# Patient Record
Sex: FEMALE | Race: WHITE | Employment: PART TIME | URBAN - METROPOLITAN AREA
[De-identification: names, ages, dates, MRNs, and addresses within clinical notes are randomized per-mention and may not be internally consistent; named-entity substitution may affect disease eponyms.]

---

## 2017-04-05 ENCOUNTER — TELEPHONE (OUTPATIENT)
Dept: SLEEP MEDICINE | Age: 52
End: 2017-04-05

## 2017-04-09 DIAGNOSIS — E06.3 HASHIMOTO'S DISEASE: Chronic | ICD-10-CM

## 2017-04-10 ENCOUNTER — TELEPHONE (OUTPATIENT)
Dept: SLEEP MEDICINE | Age: 52
End: 2017-04-10

## 2017-04-10 RX ORDER — ERGOCALCIFEROL 1.25 MG/1
CAPSULE ORAL
Qty: 8 CAPSULE | Refills: 4 | Status: SHIPPED | OUTPATIENT
Start: 2017-04-10 | End: 2017-09-05 | Stop reason: SDUPTHER

## 2017-04-10 RX ORDER — LEVOTHYROXINE SODIUM 0.05 MG/1
TABLET ORAL
Qty: 30 TABLET | Refills: 4 | Status: SHIPPED | OUTPATIENT
Start: 2017-04-10 | End: 2017-09-15 | Stop reason: SDUPTHER

## 2017-04-13 ENCOUNTER — OFFICE VISIT (OUTPATIENT)
Dept: INTERNAL MEDICINE | Age: 52
End: 2017-04-13

## 2017-04-13 ENCOUNTER — TELEPHONE (OUTPATIENT)
Dept: INTERNAL MEDICINE | Age: 52
End: 2017-04-13

## 2017-04-13 VITALS
DIASTOLIC BLOOD PRESSURE: 78 MMHG | SYSTOLIC BLOOD PRESSURE: 118 MMHG | BODY MASS INDEX: 35.12 KG/M2 | WEIGHT: 186 LBS | HEIGHT: 61 IN

## 2017-04-13 DIAGNOSIS — R10.11 RIGHT UPPER QUADRANT ABDOMINAL PAIN: Primary | ICD-10-CM

## 2017-04-13 DIAGNOSIS — G47.33 OBSTRUCTIVE SLEEP APNEA (ADULT) (PEDIATRIC): Chronic | ICD-10-CM

## 2017-04-13 DIAGNOSIS — E03.4 HYPOTHYROIDISM DUE TO ACQUIRED ATROPHY OF THYROID: ICD-10-CM

## 2017-04-13 PROCEDURE — 99214 OFFICE O/P EST MOD 30 MIN: CPT | Performed by: INTERNAL MEDICINE

## 2017-04-13 RX ORDER — CLONAZEPAM 0.5 MG/1
TABLET ORAL
Qty: 60 TABLET | Refills: 0 | OUTPATIENT
Start: 2017-04-13 | End: 2017-05-12 | Stop reason: SDUPTHER

## 2017-04-13 ASSESSMENT — ENCOUNTER SYMPTOMS
DIARRHEA: 0
SHORTNESS OF BREATH: 0
NAUSEA: 1
COUGH: 0
BACK PAIN: 0
BLOATING: 0
CHEST TIGHTNESS: 0
CONSTIPATION: 0
EYE REDNESS: 0
ABDOMINAL PAIN: 0
VOMITING: 0

## 2017-05-15 RX ORDER — CLONAZEPAM 0.5 MG/1
TABLET ORAL
Qty: 60 TABLET | Refills: 0 | Status: SHIPPED | OUTPATIENT
Start: 2017-05-15 | End: 2017-06-10 | Stop reason: SDUPTHER

## 2017-06-01 ENCOUNTER — OFFICE VISIT (OUTPATIENT)
Dept: PULMONOLOGY | Age: 52
End: 2017-06-01

## 2017-06-01 VITALS
DIASTOLIC BLOOD PRESSURE: 72 MMHG | WEIGHT: 173.2 LBS | BODY MASS INDEX: 32.7 KG/M2 | HEART RATE: 74 BPM | OXYGEN SATURATION: 98 % | HEIGHT: 61 IN | SYSTOLIC BLOOD PRESSURE: 120 MMHG

## 2017-06-01 DIAGNOSIS — M79.7 FIBROMYALGIA: Chronic | ICD-10-CM

## 2017-06-01 DIAGNOSIS — F51.04 INSOMNIA, PSYCHOPHYSIOLOGICAL: Chronic | ICD-10-CM

## 2017-06-01 DIAGNOSIS — F41.0 PANIC ATTACK: Chronic | ICD-10-CM

## 2017-06-01 DIAGNOSIS — E06.3 HASHIMOTO'S DISEASE: Chronic | ICD-10-CM

## 2017-06-01 DIAGNOSIS — G47.33 OBSTRUCTIVE SLEEP APNEA (ADULT) (PEDIATRIC): Primary | Chronic | ICD-10-CM

## 2017-06-01 PROBLEM — E03.4 HYPOTHYROIDISM DUE TO ACQUIRED ATROPHY OF THYROID: Chronic | Status: ACTIVE | Noted: 2017-04-13

## 2017-06-01 PROCEDURE — 99214 OFFICE O/P EST MOD 30 MIN: CPT | Performed by: INTERNAL MEDICINE

## 2017-06-01 RX ORDER — MIRTAZAPINE 15 MG/1
TABLET, FILM COATED ORAL
Qty: 30 TABLET | Refills: 3 | Status: SHIPPED | OUTPATIENT
Start: 2017-06-01 | End: 2017-09-01

## 2017-06-01 ASSESSMENT — SLEEP AND FATIGUE QUESTIONNAIRES
HOW LIKELY ARE YOU TO NOD OFF OR FALL ASLEEP WHEN YOU ARE A PASSENGER IN A CAR FOR AN HOUR WITHOUT A BREAK: 1
HOW LIKELY ARE YOU TO NOD OFF OR FALL ASLEEP IN A CAR, WHILE STOPPED FOR A FEW MINUTES IN TRAFFIC: 1
HOW LIKELY ARE YOU TO NOD OFF OR FALL ASLEEP WHILE LYING DOWN TO REST IN THE AFTERNOON WHEN CIRCUMSTANCES PERMIT: 1
HOW LIKELY ARE YOU TO NOD OFF OR FALL ASLEEP WHILE SITTING AND TALKING TO SOMEONE: 0
HOW LIKELY ARE YOU TO NOD OFF OR FALL ASLEEP WHILE SITTING AND READING: 1
HOW LIKELY ARE YOU TO NOD OFF OR FALL ASLEEP WHILE SITTING INACTIVE IN A PUBLIC PLACE: 0
HOW LIKELY ARE YOU TO NOD OFF OR FALL ASLEEP WHILE WATCHING TV: 1
HOW LIKELY ARE YOU TO NOD OFF OR FALL ASLEEP WHILE SITTING QUIETLY AFTER LUNCH WITHOUT ALCOHOL: 0
ESS TOTAL SCORE: 5

## 2017-06-01 ASSESSMENT — ENCOUNTER SYMPTOMS
APNEA: 0
COUGH: 0
RHINORRHEA: 0
SHORTNESS OF BREATH: 0
CHOKING: 0

## 2017-06-12 RX ORDER — CLONAZEPAM 0.5 MG/1
TABLET ORAL
Qty: 60 TABLET | Refills: 0 | OUTPATIENT
Start: 2017-06-12 | End: 2017-07-10 | Stop reason: SDUPTHER

## 2017-07-10 ENCOUNTER — TELEPHONE (OUTPATIENT)
Dept: INTERNAL MEDICINE | Age: 52
End: 2017-07-10

## 2017-07-10 RX ORDER — CLONAZEPAM 0.5 MG/1
0.5 TABLET ORAL 2 TIMES DAILY PRN
Qty: 60 TABLET | Refills: 0 | OUTPATIENT
Start: 2017-07-10 | End: 2017-08-17 | Stop reason: SDUPTHER

## 2017-08-02 ENCOUNTER — OFFICE VISIT (OUTPATIENT)
Dept: PULMONOLOGY | Age: 52
End: 2017-08-02

## 2017-08-02 VITALS
DIASTOLIC BLOOD PRESSURE: 80 MMHG | HEART RATE: 68 BPM | SYSTOLIC BLOOD PRESSURE: 118 MMHG | OXYGEN SATURATION: 98 % | WEIGHT: 167 LBS | HEIGHT: 61 IN | BODY MASS INDEX: 31.53 KG/M2

## 2017-08-02 DIAGNOSIS — M79.7 FIBROMYALGIA: Chronic | ICD-10-CM

## 2017-08-02 DIAGNOSIS — G47.33 OBSTRUCTIVE SLEEP APNEA (ADULT) (PEDIATRIC): Primary | Chronic | ICD-10-CM

## 2017-08-02 DIAGNOSIS — E66.9 NON MORBID OBESITY, UNSPECIFIED OBESITY TYPE: Chronic | ICD-10-CM

## 2017-08-02 DIAGNOSIS — F51.04 INSOMNIA, PSYCHOPHYSIOLOGICAL: Chronic | ICD-10-CM

## 2017-08-02 DIAGNOSIS — F32.A DEPRESSION, UNSPECIFIED DEPRESSION TYPE: Chronic | ICD-10-CM

## 2017-08-02 DIAGNOSIS — E06.3 HASHIMOTO'S DISEASE: Chronic | ICD-10-CM

## 2017-08-02 PROCEDURE — 99214 OFFICE O/P EST MOD 30 MIN: CPT | Performed by: INTERNAL MEDICINE

## 2017-08-02 ASSESSMENT — SLEEP AND FATIGUE QUESTIONNAIRES
HOW LIKELY ARE YOU TO NOD OFF OR FALL ASLEEP IN A CAR, WHILE STOPPED FOR A FEW MINUTES IN TRAFFIC: 1
HOW LIKELY ARE YOU TO NOD OFF OR FALL ASLEEP WHEN YOU ARE A PASSENGER IN A CAR FOR AN HOUR WITHOUT A BREAK: 1
ESS TOTAL SCORE: 6
HOW LIKELY ARE YOU TO NOD OFF OR FALL ASLEEP WHILE SITTING AND READING: 1
HOW LIKELY ARE YOU TO NOD OFF OR FALL ASLEEP WHILE SITTING INACTIVE IN A PUBLIC PLACE: 1
HOW LIKELY ARE YOU TO NOD OFF OR FALL ASLEEP WHILE SITTING QUIETLY AFTER LUNCH WITHOUT ALCOHOL: 0
HOW LIKELY ARE YOU TO NOD OFF OR FALL ASLEEP WHILE LYING DOWN TO REST IN THE AFTERNOON WHEN CIRCUMSTANCES PERMIT: 1
HOW LIKELY ARE YOU TO NOD OFF OR FALL ASLEEP WHILE WATCHING TV: 1
HOW LIKELY ARE YOU TO NOD OFF OR FALL ASLEEP WHILE SITTING AND TALKING TO SOMEONE: 0

## 2017-08-02 ASSESSMENT — ENCOUNTER SYMPTOMS
SHORTNESS OF BREATH: 0
RHINORRHEA: 0
APNEA: 0
COUGH: 0
CHOKING: 0

## 2017-08-17 RX ORDER — CLONAZEPAM 0.5 MG/1
0.5 TABLET ORAL 2 TIMES DAILY PRN
Qty: 60 TABLET | Refills: 1 | OUTPATIENT
Start: 2017-08-17 | End: 2017-11-10 | Stop reason: SDUPTHER

## 2017-08-18 ENCOUNTER — TELEPHONE (OUTPATIENT)
Dept: INTERNAL MEDICINE | Age: 52
End: 2017-08-18

## 2017-08-18 ENCOUNTER — HOSPITAL ENCOUNTER (OUTPATIENT)
Dept: SLEEP MEDICINE | Age: 52
Discharge: HOME | End: 2017-08-19
Attending: INTERNAL MEDICINE | Admitting: INTERNAL MEDICINE

## 2017-08-18 DIAGNOSIS — Z12.11 COLON CANCER SCREENING: Primary | ICD-10-CM

## 2017-08-18 DIAGNOSIS — G47.33 OBSTRUCTIVE SLEEP APNEA (ADULT) (PEDIATRIC): Chronic | ICD-10-CM

## 2017-08-18 DIAGNOSIS — Z12.4 CERVICAL CANCER SCREENING: ICD-10-CM

## 2017-08-18 PROCEDURE — 95810 POLYSOM 6/> YRS 4/> PARAM: CPT | Performed by: INTERNAL MEDICINE

## 2017-08-23 ENCOUNTER — TELEPHONE (OUTPATIENT)
Dept: SLEEP MEDICINE | Age: 52
End: 2017-08-23

## 2017-09-01 ENCOUNTER — OFFICE VISIT (OUTPATIENT)
Dept: GYNECOLOGY | Age: 52
End: 2017-09-01

## 2017-09-01 VITALS
DIASTOLIC BLOOD PRESSURE: 62 MMHG | HEIGHT: 61 IN | SYSTOLIC BLOOD PRESSURE: 118 MMHG | HEART RATE: 63 BPM | WEIGHT: 170 LBS | OXYGEN SATURATION: 95 % | BODY MASS INDEX: 32.1 KG/M2

## 2017-09-01 DIAGNOSIS — Z11.3 SCREEN FOR STD (SEXUALLY TRANSMITTED DISEASE): ICD-10-CM

## 2017-09-01 DIAGNOSIS — Z12.39 BREAST CANCER SCREENING: ICD-10-CM

## 2017-09-01 DIAGNOSIS — Z12.4 CERVICAL CANCER SCREENING: ICD-10-CM

## 2017-09-01 DIAGNOSIS — Z76.89 ENCOUNTER TO ESTABLISH CARE: Primary | ICD-10-CM

## 2017-09-01 DIAGNOSIS — Z01.419 WOMEN'S ANNUAL ROUTINE GYNECOLOGICAL EXAMINATION: ICD-10-CM

## 2017-09-01 LAB
BACTERIA WET PREP: NORMAL
CLUE CELLS: NORMAL
EPITHELIAL CELLS WET PREP: NORMAL
HEPATITIS B SURFACE ANTIGEN INTERPRETATION: NORMAL
HEPATITIS C ANTIBODY INTERPRETATION: NORMAL
RBC WET PREP: NORMAL
SOURCE WET PREP: NORMAL
TRICHOMONAS PREP: NORMAL
WBC WET PREP: NORMAL
YEAST WET PREP: NORMAL

## 2017-09-01 PROCEDURE — 99386 PREV VISIT NEW AGE 40-64: CPT | Performed by: NURSE PRACTITIONER

## 2017-09-02 LAB — RPR: NORMAL

## 2017-09-03 LAB — HIV-1 AND HIV-2 ANTIBODIES: NORMAL

## 2017-09-05 RX ORDER — ERGOCALCIFEROL 1.25 MG/1
CAPSULE ORAL
Qty: 8 CAPSULE | Refills: 3 | Status: SHIPPED | OUTPATIENT
Start: 2017-09-05 | End: 2018-01-02 | Stop reason: SDUPTHER

## 2017-09-06 LAB
HPV COMMENT: NORMAL
HPV TYPE 16: NOT DETECTED
HPV TYPE 18: NOT DETECTED
HPVOH (OTHER TYPES): NOT DETECTED

## 2017-09-07 ENCOUNTER — TELEPHONE (OUTPATIENT)
Dept: PULMONOLOGY | Age: 52
End: 2017-09-07

## 2017-09-07 RX ORDER — GABAPENTIN 300 MG/1
CAPSULE ORAL
Qty: 90 CAPSULE | Refills: 3 | Status: SHIPPED | OUTPATIENT
Start: 2017-09-07 | End: 2018-02-13 | Stop reason: SDUPTHER

## 2017-09-08 LAB
C. TRACHOMATIS DNA,THIN PREP: NEGATIVE
N. GONORRHOEAE DNA, THIN PREP: NEGATIVE

## 2017-09-11 ENCOUNTER — OFFICE VISIT (OUTPATIENT)
Dept: ENDOCRINOLOGY | Age: 52
End: 2017-09-11

## 2017-09-11 VITALS
BODY MASS INDEX: 31.49 KG/M2 | DIASTOLIC BLOOD PRESSURE: 62 MMHG | SYSTOLIC BLOOD PRESSURE: 110 MMHG | RESPIRATION RATE: 16 BRPM | HEIGHT: 61 IN | HEART RATE: 55 BPM | OXYGEN SATURATION: 98 % | WEIGHT: 166.8 LBS

## 2017-09-11 DIAGNOSIS — E03.4 HYPOTHYROIDISM DUE TO ACQUIRED ATROPHY OF THYROID: Chronic | ICD-10-CM

## 2017-09-11 DIAGNOSIS — E04.1 THYROID NODULE: ICD-10-CM

## 2017-09-11 DIAGNOSIS — E55.9 VITAMIN D DEFICIENCY: ICD-10-CM

## 2017-09-11 DIAGNOSIS — E06.3 HASHIMOTO'S DISEASE: Primary | Chronic | ICD-10-CM

## 2017-09-11 DIAGNOSIS — E03.8 SUBCLINICAL HYPOTHYROIDISM: ICD-10-CM

## 2017-09-11 PROCEDURE — 99213 OFFICE O/P EST LOW 20 MIN: CPT | Performed by: INTERNAL MEDICINE

## 2017-09-12 ENCOUNTER — TELEPHONE (OUTPATIENT)
Dept: ENDOCRINOLOGY | Age: 52
End: 2017-09-12

## 2017-09-15 DIAGNOSIS — E06.3 HASHIMOTO'S DISEASE: Chronic | ICD-10-CM

## 2017-09-15 RX ORDER — LEVOTHYROXINE SODIUM 0.05 MG/1
TABLET ORAL
Qty: 30 TABLET | Refills: 3 | Status: SHIPPED | OUTPATIENT
Start: 2017-09-15 | End: 2018-01-18 | Stop reason: SDUPTHER

## 2017-09-19 ENCOUNTER — TELEPHONE (OUTPATIENT)
Dept: SLEEP MEDICINE | Age: 52
End: 2017-09-19

## 2017-10-02 ENCOUNTER — TELEPHONE (OUTPATIENT)
Dept: INTERNAL MEDICINE | Age: 52
End: 2017-10-02

## 2017-10-02 DIAGNOSIS — Z12.11 ENCOUNTER FOR SCREENING COLONOSCOPY: Primary | ICD-10-CM

## 2017-10-02 NOTE — TELEPHONE ENCOUNTER
Patient needs an insurance referral to see:  Dr. Rosa M Jones-  Gastroenterology    Insurance: [address] ID # [id number]     Appointment is on : Needs referral first- but would like to schedule asap    What are they being seen for: Colonoscopy    Tax ID # or NPI # Pt did not have    Fax number [phone]    Phone number [phone]

## 2017-10-03 NOTE — TELEPHONE ENCOUNTER
I tried doing a referral online [facility] and it stated patient didn't need a referral but the gastro place was stating she needed one. So I ordered one thru the computer and faxed it.  Patient informed

## 2017-10-20 ENCOUNTER — TELEPHONE (OUTPATIENT)
Dept: SLEEP MEDICINE | Age: 52
End: 2017-10-20

## 2017-10-20 NOTE — TELEPHONE ENCOUNTER
Pt called to cancel appointment for 10/25. Pt lmom. I returned Carolynn Lieberman phone call and left a message on their voicemail to call the office back at their earliest convenience to reschedule the appt.

## 2017-11-10 ENCOUNTER — TELEPHONE (OUTPATIENT)
Dept: INTERNAL MEDICINE | Age: 52
End: 2017-11-10

## 2017-11-10 RX ORDER — CLONAZEPAM 0.5 MG/1
0.5 TABLET ORAL 2 TIMES DAILY PRN
Qty: 60 TABLET | Refills: 1 | OUTPATIENT
Start: 2017-11-10 | End: 2018-04-19 | Stop reason: SDUPTHER

## 2017-11-10 NOTE — TELEPHONE ENCOUNTER
Ok to phone in refill. Orders Placed This Encounter   Medications    clonazePAM (KLONOPIN) 0.5 MG tablet     Sig: Take 1 tablet by mouth 2 times daily as needed for Anxiety . Dispense:  60 tablet     Refill:  1     Controlled Substances Monitoring:     Attestation: The Prescription Monitoring Report for this patient was reviewed today. (Emanuel Rice MD)  Documentation: No signs of potential drug abuse or diversion identified., Existing medication contract.  Ira Castro MD)

## 2017-11-10 NOTE — TELEPHONE ENCOUNTER
Pt called needs RX for clonazePAM (KLONOPIN) 0.5 MG tablet Sent to . .. Pharm on file . .. Advised Pt to check with Pharm in 24 hours . ..  Pls send

## 2018-01-02 RX ORDER — ERGOCALCIFEROL 1.25 MG/1
CAPSULE ORAL
Qty: 8 CAPSULE | Refills: 2 | Status: SHIPPED | OUTPATIENT
Start: 2018-01-02 | End: 2018-06-17 | Stop reason: SDUPTHER

## 2018-01-03 ENCOUNTER — TELEPHONE (OUTPATIENT)
Dept: INTERNAL MEDICINE | Age: 53
End: 2018-01-03

## 2018-01-18 DIAGNOSIS — E06.3 HASHIMOTO'S DISEASE: Chronic | ICD-10-CM

## 2018-01-18 RX ORDER — LEVOTHYROXINE SODIUM 0.05 MG/1
TABLET ORAL
Qty: 30 TABLET | Refills: 3 | Status: SHIPPED | OUTPATIENT
Start: 2018-01-18 | End: 2018-06-16 | Stop reason: SDUPTHER

## 2018-02-13 DIAGNOSIS — G47.33 OBSTRUCTIVE SLEEP APNEA: Primary | Chronic | ICD-10-CM

## 2018-02-13 RX ORDER — GABAPENTIN 300 MG/1
CAPSULE ORAL
Qty: 90 CAPSULE | Refills: 0 | Status: SHIPPED | OUTPATIENT
Start: 2018-02-13 | End: 2018-03-19 | Stop reason: SDUPTHER

## 2018-02-22 ENCOUNTER — OFFICE VISIT (OUTPATIENT)
Dept: INTERNAL MEDICINE | Age: 53
End: 2018-02-22

## 2018-02-22 VITALS
DIASTOLIC BLOOD PRESSURE: 68 MMHG | HEIGHT: 61 IN | OXYGEN SATURATION: 98 % | WEIGHT: 153 LBS | BODY MASS INDEX: 28.89 KG/M2 | HEART RATE: 74 BPM | SYSTOLIC BLOOD PRESSURE: 122 MMHG

## 2018-02-22 DIAGNOSIS — M54.50 CHRONIC MIDLINE LOW BACK PAIN WITHOUT SCIATICA: ICD-10-CM

## 2018-02-22 DIAGNOSIS — E03.4 HYPOTHYROIDISM DUE TO ACQUIRED ATROPHY OF THYROID: Chronic | ICD-10-CM

## 2018-02-22 DIAGNOSIS — G89.29 CHRONIC MIDLINE LOW BACK PAIN WITHOUT SCIATICA: ICD-10-CM

## 2018-02-22 DIAGNOSIS — C20 RECTAL CANCER (HCC): ICD-10-CM

## 2018-02-22 DIAGNOSIS — Z01.818 PRE-OP EXAM: Primary | ICD-10-CM

## 2018-02-22 DIAGNOSIS — Z01.818 PRE-OP EXAM: ICD-10-CM

## 2018-02-22 LAB
ANION GAP SERPL CALCULATED.3IONS-SCNC: 14 MMOL/L (ref 3–16)
BASOPHILS ABSOLUTE: 0.1 K/UL (ref 0–0.2)
BASOPHILS RELATIVE PERCENT: 0.7 %
BUN BLDV-MCNC: 18 MG/DL (ref 7–20)
CALCIUM SERPL-MCNC: 10.1 MG/DL (ref 8.3–10.6)
CHLORIDE BLD-SCNC: 101 MMOL/L (ref 99–110)
CO2: 24 MMOL/L (ref 21–32)
CREAT SERPL-MCNC: 0.6 MG/DL (ref 0.6–1.1)
EOSINOPHILS ABSOLUTE: 0.2 K/UL (ref 0–0.6)
EOSINOPHILS RELATIVE PERCENT: 2.4 %
GFR AFRICAN AMERICAN: >60
GFR NON-AFRICAN AMERICAN: >60
GLUCOSE BLD-MCNC: 91 MG/DL (ref 70–99)
HCT VFR BLD CALC: 42.3 % (ref 36–48)
HEMOGLOBIN: 13.9 G/DL (ref 12–16)
LYMPHOCYTES ABSOLUTE: 4.1 K/UL (ref 1–5.1)
LYMPHOCYTES RELATIVE PERCENT: 39.7 %
MCH RBC QN AUTO: 30.4 PG (ref 26–34)
MCHC RBC AUTO-ENTMCNC: 32.8 G/DL (ref 31–36)
MCV RBC AUTO: 92.6 FL (ref 80–100)
MONOCYTES ABSOLUTE: 0.7 K/UL (ref 0–1.3)
MONOCYTES RELATIVE PERCENT: 6.3 %
NEUTROPHILS ABSOLUTE: 5.3 K/UL (ref 1.7–7.7)
NEUTROPHILS RELATIVE PERCENT: 50.9 %
PDW BLD-RTO: 14.3 % (ref 12.4–15.4)
PLATELET # BLD: 340 K/UL (ref 135–450)
PMV BLD AUTO: 10 FL (ref 5–10.5)
POTASSIUM SERPL-SCNC: 4.4 MMOL/L (ref 3.5–5.1)
RBC # BLD: 4.57 M/UL (ref 4–5.2)
SODIUM BLD-SCNC: 139 MMOL/L (ref 136–145)
T4 FREE: 1.3 NG/DL (ref 0.9–1.8)
TSH SERPL DL<=0.05 MIU/L-ACNC: 3.12 UIU/ML (ref 0.27–4.2)
WBC # BLD: 10.4 K/UL (ref 4–11)

## 2018-02-22 PROCEDURE — G8427 DOCREV CUR MEDS BY ELIG CLIN: HCPCS | Performed by: INTERNAL MEDICINE

## 2018-02-22 PROCEDURE — G8417 CALC BMI ABV UP PARAM F/U: HCPCS | Performed by: INTERNAL MEDICINE

## 2018-02-22 PROCEDURE — G8482 FLU IMMUNIZE ORDER/ADMIN: HCPCS | Performed by: INTERNAL MEDICINE

## 2018-02-22 PROCEDURE — 99243 OFF/OP CNSLTJ NEW/EST LOW 30: CPT | Performed by: INTERNAL MEDICINE

## 2018-02-22 PROCEDURE — 3017F COLORECTAL CA SCREEN DOC REV: CPT | Performed by: INTERNAL MEDICINE

## 2018-02-22 RX ORDER — CYCLOBENZAPRINE HCL 10 MG
10 TABLET ORAL 3 TIMES DAILY PRN
Qty: 30 TABLET | Refills: 1 | Status: SHIPPED | OUTPATIENT
Start: 2018-02-22 | End: 2018-04-09 | Stop reason: SDUPTHER

## 2018-02-22 ASSESSMENT — PATIENT HEALTH QUESTIONNAIRE - PHQ9
SUM OF ALL RESPONSES TO PHQ QUESTIONS 1-9: 0
1. LITTLE INTEREST OR PLEASURE IN DOING THINGS: 0
2. FEELING DOWN, DEPRESSED OR HOPELESS: 0
SUM OF ALL RESPONSES TO PHQ9 QUESTIONS 1 & 2: 0

## 2018-02-22 NOTE — PROGRESS NOTES
Chief Complaint   Patient presents with    Pre-op Exam     3/5, Dr. Kenna Vizcarra, [place], colostomy reversal   [phone] and [phone]       Erika Hill is a 50 y.o. female who presents for a preoperative physical examination. She is scheduled to have ileostomy closure done by Dr. Umberto Acosta at [facility] on 3/5/18. History of Present Illness:      She had left colectomy with ileostomy for rectal cancer, but is now able to have ileostomy closure. Past Medical History:   Diagnosis Date    Back pain     Chicken pox     Depression     NO MEDS NOW OKAY    Fibromyalgia     Gastric ulcer, unspecified as acute or chronic, without mention of hemorrhage, perforation, or obstruction     GERD (gastroesophageal reflux disease)     Insomnia     Sleep study :dxed w/interuption onset insomnia    Marijuana use     no further controlled substances while using marijuana.  Mixed hyperlipidemia 10/17/2011    SLIGHT ELEVATION NO MEDS    Mononucleosis     Panic attacks     Pap smear 2010;10/17/11;10/2012    Nml. Dr. Maddox(prior gyn)    Rectal cancer [facility]) 2017    Screening mammogram 2010;2011;2013    Nml.      Sleep apnea     DOESN'T KNOW SETTINGS    Subclinical hypothyroidism 2013    Thyroid nodules:Right 2013    Wheezing 2013          Review of patient's past surgical history indicates:     Past Surgical History:   Procedure Laterality Date     SECTION      CHOLECYSTECTOMY N/A 10/13/2015   [facility] DENTAL SURGERY      ENDOSCOPY, COLON, DIAGNOSTIC      LEFT COLECTOMY  2017    Kenna Vizcarra MD, rectal cancer     OTHER SURGICAL HISTORY  10/13/15    LAPAROSCOPIC CHOLECYSTECTOMY    PILONIDAL CYST EXCISION      TONSILLECTOMY                                                       Current Outpatient Prescriptions   Medication Sig Dispense Refill    gabapentin (NEURONTIN) 300 MG capsule 2-3 caps po qHS, start 2 cap po qHS, can increase by 1 cap in 7 nights if not better to 3 po qHS. Hannah Riedel 90 capsule 0    levothyroxine (SYNTHROID) 50 MCG tablet TAKE ONE TABLET BY MOUTH DAILY 30 tablet 3    vitamin D (ERGOCALCIFEROL) 77060 units CAPS capsule TAKE ONE CAPSULE BY MOUTH TWICE WEEKLY 8 capsule 2    clonazePAM (KLONOPIN) 0.5 MG tablet Take 1 tablet by mouth 2 times daily as needed for Anxiety . 60 tablet 1    tiZANidine (ZANAFLEX) 2 MG tablet TAKE ONE TABLET BY MOUTH THREE TIMES A DAY AS TOLERATED. IF THIS TABLET MAKES YOU DROWSY, TAKE ONE TO TWO TABLETS BY MOUTH AT BEDTIME ONLY 90 tablet 0    selenium 50 MCG tablet Take 50 mcg by mouth daily       No current facility-administered medications for this visit.         Allergies   Allergen Reactions    Lamictal [Lamotrigine] Rash    Morphine      POSSIBLE DELAYED  SWELLING OF THROAT 12 HOURS LATER  USED BENADRYL TO REVERSE    Naproxen      Stomach     Sulfa Antibiotics Swelling     AND RASH       Social History   Substance Use Topics    Smoking status: Former Smoker     Packs/day: 1.00     Years: 30.00     Types: Cigarettes     Quit date: 6/14/2009    Smokeless tobacco: Never Used    Alcohol use 0.6 oz/week     1 Cans of beer per week      Comment: Occasional        Family History   Problem Relation Age of Onset    High Cholesterol Mother     High Blood Pressure Father     Heart Disease Father     COPD Father     Stroke Maternal Grandfather     Thyroid Disease Sister      Hypothyroidism        Review Of Systems    Skin: no abnormal pigmentation, rash, scaling, itching, masses, hair or nail changes  Eyes: negative  Ears/Nose/Throat: negative  Respiratory: negative  Cardiovascular: negative  Gastrointestinal: she is eating low residue, high protein diet  Genitourinary: negative  Musculoskeletal: negative  Neurologic: negative  Psychiatric: negative  Hematologic/Lymphatic/Immunologic: negative  Endocrine: negative    PHYSICAL EXAMINATION:  /68   Pulse 74   Ht 5' 1\"

## 2018-03-14 ENCOUNTER — OFFICE VISIT (OUTPATIENT)
Dept: PULMONOLOGY | Age: 53
End: 2018-03-14

## 2018-03-14 VITALS
DIASTOLIC BLOOD PRESSURE: 70 MMHG | HEIGHT: 61 IN | WEIGHT: 157.2 LBS | HEART RATE: 76 BPM | BODY MASS INDEX: 29.68 KG/M2 | SYSTOLIC BLOOD PRESSURE: 110 MMHG | OXYGEN SATURATION: 98 %

## 2018-03-14 DIAGNOSIS — M79.7 FIBROMYALGIA: Chronic | ICD-10-CM

## 2018-03-14 DIAGNOSIS — F51.04 INSOMNIA, PSYCHOPHYSIOLOGICAL: Chronic | ICD-10-CM

## 2018-03-14 DIAGNOSIS — G47.33 OBSTRUCTIVE SLEEP APNEA: Primary | Chronic | ICD-10-CM

## 2018-03-14 DIAGNOSIS — F32.A DEPRESSION, UNSPECIFIED DEPRESSION TYPE: Chronic | ICD-10-CM

## 2018-03-14 DIAGNOSIS — E66.9 NON MORBID OBESITY, UNSPECIFIED OBESITY TYPE: Chronic | ICD-10-CM

## 2018-03-14 DIAGNOSIS — F41.0 PANIC ATTACK: Chronic | ICD-10-CM

## 2018-03-14 PROCEDURE — 1036F TOBACCO NON-USER: CPT | Performed by: INTERNAL MEDICINE

## 2018-03-14 PROCEDURE — 99214 OFFICE O/P EST MOD 30 MIN: CPT | Performed by: INTERNAL MEDICINE

## 2018-03-14 PROCEDURE — 3017F COLORECTAL CA SCREEN DOC REV: CPT | Performed by: INTERNAL MEDICINE

## 2018-03-14 PROCEDURE — G8482 FLU IMMUNIZE ORDER/ADMIN: HCPCS | Performed by: INTERNAL MEDICINE

## 2018-03-14 PROCEDURE — G8427 DOCREV CUR MEDS BY ELIG CLIN: HCPCS | Performed by: INTERNAL MEDICINE

## 2018-03-14 PROCEDURE — G8417 CALC BMI ABV UP PARAM F/U: HCPCS | Performed by: INTERNAL MEDICINE

## 2018-03-14 ASSESSMENT — SLEEP AND FATIGUE QUESTIONNAIRES
HOW LIKELY ARE YOU TO NOD OFF OR FALL ASLEEP WHEN YOU ARE A PASSENGER IN A CAR FOR AN HOUR WITHOUT A BREAK: 1
HOW LIKELY ARE YOU TO NOD OFF OR FALL ASLEEP WHILE SITTING INACTIVE IN A PUBLIC PLACE: 1
HOW LIKELY ARE YOU TO NOD OFF OR FALL ASLEEP WHILE SITTING QUIETLY AFTER LUNCH WITHOUT ALCOHOL: 0
HOW LIKELY ARE YOU TO NOD OFF OR FALL ASLEEP WHILE WATCHING TV: 1
ESS TOTAL SCORE: 6
HOW LIKELY ARE YOU TO NOD OFF OR FALL ASLEEP WHILE SITTING AND READING: 1
HOW LIKELY ARE YOU TO NOD OFF OR FALL ASLEEP WHILE SITTING AND TALKING TO SOMEONE: 0
HOW LIKELY ARE YOU TO NOD OFF OR FALL ASLEEP WHILE LYING DOWN TO REST IN THE AFTERNOON WHEN CIRCUMSTANCES PERMIT: 1
HOW LIKELY ARE YOU TO NOD OFF OR FALL ASLEEP IN A CAR, WHILE STOPPED FOR A FEW MINUTES IN TRAFFIC: 1

## 2018-03-14 ASSESSMENT — ENCOUNTER SYMPTOMS
APNEA: 0
CHOKING: 0
DIARRHEA: 1
COUGH: 0
RHINORRHEA: 0
SHORTNESS OF BREATH: 0

## 2018-03-14 NOTE — PROGRESS NOTES
Yes     Partners: Male     Birth control/ protection: Condom     Other Topics Concern    Not on file     Social History Narrative    No narrative on file       Prior to Admission medications    Medication Sig Start Date End Date Taking? Authorizing Provider   gabapentin (NEURONTIN) 300 MG capsule 2-3 caps po qHS, start 2 cap po qHS, can increase by 1 cap in 7 nights if not better to 3 po qHS. . 2/13/18 3/15/18 Yes Favio Segundo MD   levothyroxine (SYNTHROID) 50 MCG tablet TAKE ONE TABLET BY MOUTH DAILY 1/18/18  Yes Rashawn Florentino MD   vitamin D (ERGOCALCIFEROL) 46232 units CAPS capsule TAKE ONE CAPSULE BY MOUTH TWICE WEEKLY 1/2/18  Yes Rashawn Florentino MD   clonazePAM (KLONOPIN) 0.5 MG tablet Take 1 tablet by mouth 2 times daily as needed for Anxiety .  11/10/17  Yes Hari Cantu MD       Allergies as of 03/14/2018 - Review Complete 03/14/2018   Allergen Reaction Noted    Lamictal [lamotrigine] Rash 10/07/2013    Morphine  06/14/2011    Naproxen  01/25/2012    Sulfa antibiotics Swelling 06/14/2011       Patient Active Problem List   Diagnosis    Depression    Fibromyalgia    Mixed hyperlipidemia    Contusion, ankle/foot:left    Left ankle injury    Injury of left foot    Contusion:left foot/ankle    ContusionLeft foot/ankle    Ankle joint effusion:Left    GERD (gastroesophageal reflux disease)    Fatigue    Acute sinusitis    Heat intolerance    Low T4    Abnormal thyroid blood test    Subclinical hypothyroidism    Elevated TSH    Thyroid nodules:Right    Hashimoto's disease    Multiple thyroid nodules    Allergic dermatitis due ingested medication:lamictal    Medication reaction    Anxiety    Vitamin D deficiency    Sleep disorder    Calculus of gallbladder without cholecystitis without obstruction    Tendinitis of right elbow    Right anterior shoulder pain    Ulnar nerve compression    Leukocytosis    Hyperglycemia    Person consulting for explanation of examination or Positive for sleep disturbance. The patient is nervous/anxious. Vitals:  Weight BMI   Wt Readings from Last 3 Encounters:   03/14/18 157 lb 3.2 oz (71.3 kg)   02/22/18 153 lb (69.4 kg)   09/11/17 166 lb 12.8 oz (75.7 kg)    Body mass index is 29.7 kg/m². BP HR SaO2   BP Readings from Last 3 Encounters:   03/14/18 110/70   02/22/18 122/68   09/11/17 110/62    Pulse Readings from Last 3 Encounters:   03/14/18 76   02/22/18 74   09/11/17 55    SpO2 Readings from Last 3 Encounters:   03/14/18 98%   02/22/18 98%   09/11/17 98%        Assessment:      1. Obstructive sleep apnea Worsening    2. Insomnia, psychophysiological Worsening    3. Fibromyalgia Stable    4. Depression, unspecified depression type Stable    5. Panic attack Stable    6. Non morbid obesity, unspecified obesity type Stable           Plan:      Continue medications per her PCP and other physicians. She instructed not to drive unless had 4 hrs of effective therapy for her ETTA the night before. Did review the risks of under or untreated ETTA including, but not limited to, higher risks of motor vehicle accidents, stroke, heart attacks, and death. She understands and accepts all these risks. The chronic medical conditions listed are directly related to the primary diagnosis listed above. The management of the primary diagnosis affects the secondary diagnosis and vice versa. Again discussed pt needs to see psych for control of her panic attacks. Needs to use her machine each night, especially when using klonopin. Cont neurontin for now. Cont meds for fibro and depression. Did discuss OA vs Inspire. 3 month f/u.        Michela Mcclelland MD, Melonie Saldaña, [facility]  Medical Director  [facility] and [address] [place] [address]

## 2018-03-19 ENCOUNTER — TELEPHONE (OUTPATIENT)
Dept: SLEEP MEDICINE | Age: 53
End: 2018-03-19

## 2018-03-19 DIAGNOSIS — G47.33 OBSTRUCTIVE SLEEP APNEA: Chronic | ICD-10-CM

## 2018-03-19 RX ORDER — GABAPENTIN 300 MG/1
CAPSULE ORAL
Qty: 90 CAPSULE | Refills: 2 | Status: SHIPPED | OUTPATIENT
Start: 2018-03-19 | End: 2018-07-18 | Stop reason: ALTCHOICE

## 2018-03-19 NOTE — TELEPHONE ENCOUNTER
Received refill request from [facility] for gabapentin, please advise    Last ov 3/14/18 (says she takes 600mg of gabapentin)  Last refill 2/13/18

## 2018-04-09 ENCOUNTER — NURSE ONLY (OUTPATIENT)
Dept: INTERNAL MEDICINE | Age: 53
End: 2018-04-09

## 2018-04-09 ENCOUNTER — TELEPHONE (OUTPATIENT)
Dept: INTERNAL MEDICINE | Age: 53
End: 2018-04-09

## 2018-04-09 VITALS
SYSTOLIC BLOOD PRESSURE: 122 MMHG | DIASTOLIC BLOOD PRESSURE: 64 MMHG | BODY MASS INDEX: 28.89 KG/M2 | OXYGEN SATURATION: 95 % | HEIGHT: 61 IN | TEMPERATURE: 98 F | HEART RATE: 89 BPM | WEIGHT: 153 LBS

## 2018-04-09 DIAGNOSIS — G89.29 CHRONIC MIDLINE LOW BACK PAIN WITHOUT SCIATICA: ICD-10-CM

## 2018-04-09 DIAGNOSIS — M54.50 CHRONIC MIDLINE LOW BACK PAIN WITHOUT SCIATICA: ICD-10-CM

## 2018-04-09 DIAGNOSIS — M54.2 NECK PAIN: ICD-10-CM

## 2018-04-09 DIAGNOSIS — R68.89 FLU-LIKE SYMPTOMS: Primary | ICD-10-CM

## 2018-04-09 LAB
INFLUENZA A ANTIGEN, POC: NORMAL
INFLUENZA B ANTIGEN, POC: NORMAL

## 2018-04-09 PROCEDURE — 87804 INFLUENZA ASSAY W/OPTIC: CPT | Performed by: INTERNAL MEDICINE

## 2018-04-09 PROCEDURE — 99213 OFFICE O/P EST LOW 20 MIN: CPT | Performed by: INTERNAL MEDICINE

## 2018-04-09 RX ORDER — CYCLOBENZAPRINE HCL 10 MG
10 TABLET ORAL 3 TIMES DAILY PRN
Qty: 30 TABLET | Refills: 1 | Status: SHIPPED | OUTPATIENT
Start: 2018-04-09 | End: 2018-04-19

## 2018-04-09 ASSESSMENT — ENCOUNTER SYMPTOMS
BACK PAIN: 0
COUGH: 0
NAUSEA: 1
CONSTIPATION: 0
CHEST TIGHTNESS: 0
BLOOD IN STOOL: 0
VOMITING: 1
ABDOMINAL DISTENTION: 0
ABDOMINAL PAIN: 0
SHORTNESS OF BREATH: 0
EYE REDNESS: 0

## 2018-04-17 ENCOUNTER — TELEPHONE (OUTPATIENT)
Dept: SLEEP MEDICINE | Age: 53
End: 2018-04-17

## 2018-06-16 DIAGNOSIS — E06.3 HASHIMOTO'S DISEASE: Chronic | ICD-10-CM

## 2018-06-17 DIAGNOSIS — F41.9 ANXIETY: ICD-10-CM

## 2018-06-18 RX ORDER — CLONAZEPAM 0.5 MG/1
TABLET ORAL
Qty: 60 TABLET | Refills: 0 | OUTPATIENT
Start: 2018-06-18 | End: 2018-07-18 | Stop reason: SDUPTHER

## 2018-06-18 RX ORDER — ERGOCALCIFEROL 1.25 MG/1
CAPSULE ORAL
Qty: 8 CAPSULE | Refills: 1 | Status: SHIPPED | OUTPATIENT
Start: 2018-06-18 | End: 2018-11-12 | Stop reason: SDUPTHER

## 2018-06-18 RX ORDER — LEVOTHYROXINE SODIUM 0.05 MG/1
TABLET ORAL
Qty: 30 TABLET | Refills: 3 | Status: SHIPPED | OUTPATIENT
Start: 2018-06-18 | End: 2018-11-12 | Stop reason: SDUPTHER

## 2018-07-18 ENCOUNTER — OFFICE VISIT (OUTPATIENT)
Dept: INTERNAL MEDICINE | Age: 53
End: 2018-07-18

## 2018-07-18 VITALS
BODY MASS INDEX: 27.56 KG/M2 | OXYGEN SATURATION: 98 % | WEIGHT: 146 LBS | HEIGHT: 61 IN | SYSTOLIC BLOOD PRESSURE: 120 MMHG | DIASTOLIC BLOOD PRESSURE: 78 MMHG | RESPIRATION RATE: 14 BRPM | HEART RATE: 67 BPM

## 2018-07-18 DIAGNOSIS — G47.33 OBSTRUCTIVE SLEEP APNEA: Primary | Chronic | ICD-10-CM

## 2018-07-18 DIAGNOSIS — Z15.02 MONOALLELIC MUTATION OF CHEK2 GENE IN FEMALE PATIENT: ICD-10-CM

## 2018-07-18 DIAGNOSIS — F41.9 ANXIETY: ICD-10-CM

## 2018-07-18 DIAGNOSIS — Z15.09 MONOALLELIC MUTATION OF CHEK2 GENE IN FEMALE PATIENT: ICD-10-CM

## 2018-07-18 DIAGNOSIS — Z12.31 ENCOUNTER FOR SCREENING MAMMOGRAM FOR BREAST CANCER: Primary | ICD-10-CM

## 2018-07-18 DIAGNOSIS — M79.7 FIBROMYALGIA: Chronic | ICD-10-CM

## 2018-07-18 DIAGNOSIS — Z15.01 MONOALLELIC MUTATION OF CHEK2 GENE IN FEMALE PATIENT: ICD-10-CM

## 2018-07-18 DIAGNOSIS — Z15.89 MONOALLELIC MUTATION OF CHEK2 GENE IN FEMALE PATIENT: ICD-10-CM

## 2018-07-18 PROCEDURE — 99213 OFFICE O/P EST LOW 20 MIN: CPT | Performed by: INTERNAL MEDICINE

## 2018-07-18 PROCEDURE — G8417 CALC BMI ABV UP PARAM F/U: HCPCS | Performed by: INTERNAL MEDICINE

## 2018-07-18 PROCEDURE — 1036F TOBACCO NON-USER: CPT | Performed by: INTERNAL MEDICINE

## 2018-07-18 PROCEDURE — 3017F COLORECTAL CA SCREEN DOC REV: CPT | Performed by: INTERNAL MEDICINE

## 2018-07-18 PROCEDURE — G8427 DOCREV CUR MEDS BY ELIG CLIN: HCPCS | Performed by: INTERNAL MEDICINE

## 2018-07-18 RX ORDER — GABAPENTIN 300 MG/1
600 CAPSULE ORAL NIGHTLY
Qty: 60 CAPSULE | Refills: 2 | Status: SHIPPED | OUTPATIENT
Start: 2018-07-18 | End: 2019-01-18 | Stop reason: SDUPTHER

## 2018-07-18 RX ORDER — GABAPENTIN 300 MG/1
600 CAPSULE ORAL NIGHTLY
Qty: 60 CAPSULE | Refills: 2 | Status: SHIPPED | OUTPATIENT
Start: 2018-07-18 | End: 2018-07-18 | Stop reason: SDUPTHER

## 2018-07-18 RX ORDER — CLONAZEPAM 0.5 MG/1
TABLET ORAL
Qty: 60 TABLET | Refills: 2 | Status: SHIPPED | OUTPATIENT
Start: 2018-07-18 | End: 2019-02-06 | Stop reason: SDUPTHER

## 2018-07-18 ASSESSMENT — ENCOUNTER SYMPTOMS
COUGH: 0
EYE REDNESS: 0
BACK PAIN: 0
CHEST TIGHTNESS: 0
SHORTNESS OF BREATH: 0
NAUSEA: 0
ABDOMINAL PAIN: 0

## 2018-07-21 ENCOUNTER — HOSPITAL ENCOUNTER (OUTPATIENT)
Dept: MAMMOGRAPHY | Age: 53
Discharge: HOME | End: 2018-07-21
Payer: MEDICAID

## 2018-07-21 DIAGNOSIS — Z12.31 VISIT FOR SCREENING MAMMOGRAM: ICD-10-CM

## 2018-07-21 PROCEDURE — 77063 BREAST TOMOSYNTHESIS BI: CPT

## 2018-07-25 ENCOUNTER — TELEPHONE (OUTPATIENT)
Dept: INTERNAL MEDICINE | Age: 53
End: 2018-07-25

## 2018-07-25 ENCOUNTER — HOSPITAL ENCOUNTER (OUTPATIENT)
Dept: MRI IMAGING | Age: 53
Discharge: HOME | End: 2018-07-25
Payer: MEDICAID

## 2018-07-25 DIAGNOSIS — Z15.01 MONOALLELIC MUTATION OF CHEK2 GENE IN FEMALE PATIENT: ICD-10-CM

## 2018-07-25 DIAGNOSIS — Z12.31 ENCOUNTER FOR SCREENING MAMMOGRAM FOR BREAST CANCER: ICD-10-CM

## 2018-07-25 DIAGNOSIS — Z15.89 MONOALLELIC MUTATION OF CHEK2 GENE IN FEMALE PATIENT: ICD-10-CM

## 2018-07-25 DIAGNOSIS — Z15.09 MONOALLELIC MUTATION OF CHEK2 GENE IN FEMALE PATIENT: ICD-10-CM

## 2018-07-25 DIAGNOSIS — Z15.02 MONOALLELIC MUTATION OF CHEK2 GENE IN FEMALE PATIENT: ICD-10-CM

## 2018-07-25 PROCEDURE — 6360000004 HC RX CONTRAST MEDICATION: Performed by: INTERNAL MEDICINE

## 2018-07-25 PROCEDURE — C8908 MRI W/O FOL W/CONT, BREAST,: HCPCS

## 2018-07-25 PROCEDURE — A9579 GAD-BASE MR CONTRAST NOS,1ML: HCPCS | Performed by: INTERNAL MEDICINE

## 2018-07-25 RX ADMIN — GADOTERIDOL 13 ML: 279.3 INJECTION, SOLUTION INTRAVENOUS at 09:32

## 2018-07-27 DIAGNOSIS — N64.9 LESION OF BREAST: Primary | ICD-10-CM

## 2018-07-31 ENCOUNTER — HOSPITAL ENCOUNTER (OUTPATIENT)
Dept: ULTRASOUND IMAGING | Age: 53
Discharge: HOME | End: 2018-07-31
Payer: MEDICAID

## 2018-07-31 DIAGNOSIS — N64.9 LESION OF BREAST: ICD-10-CM

## 2018-07-31 PROCEDURE — 76642 ULTRASOUND BREAST LIMITED: CPT

## 2018-11-12 ENCOUNTER — OFFICE VISIT (OUTPATIENT)
Dept: ENDOCRINOLOGY | Age: 53
End: 2018-11-12
Payer: COMMERCIAL

## 2018-11-12 VITALS
BODY MASS INDEX: 28.28 KG/M2 | HEART RATE: 68 BPM | RESPIRATION RATE: 16 BRPM | SYSTOLIC BLOOD PRESSURE: 107 MMHG | DIASTOLIC BLOOD PRESSURE: 69 MMHG | HEIGHT: 61 IN | OXYGEN SATURATION: 99 % | WEIGHT: 149.8 LBS

## 2018-11-12 DIAGNOSIS — E04.1 THYROID NODULE: ICD-10-CM

## 2018-11-12 DIAGNOSIS — E03.8 SUBCLINICAL HYPOTHYROIDISM: Primary | ICD-10-CM

## 2018-11-12 DIAGNOSIS — E06.3 HASHIMOTO'S DISEASE: Chronic | ICD-10-CM

## 2018-11-12 PROCEDURE — 99213 OFFICE O/P EST LOW 20 MIN: CPT | Performed by: INTERNAL MEDICINE

## 2018-11-12 PROCEDURE — G8427 DOCREV CUR MEDS BY ELIG CLIN: HCPCS | Performed by: INTERNAL MEDICINE

## 2018-11-12 PROCEDURE — G8484 FLU IMMUNIZE NO ADMIN: HCPCS | Performed by: INTERNAL MEDICINE

## 2018-11-12 PROCEDURE — 1036F TOBACCO NON-USER: CPT | Performed by: INTERNAL MEDICINE

## 2018-11-12 PROCEDURE — 3017F COLORECTAL CA SCREEN DOC REV: CPT | Performed by: INTERNAL MEDICINE

## 2018-11-12 PROCEDURE — G8417 CALC BMI ABV UP PARAM F/U: HCPCS | Performed by: INTERNAL MEDICINE

## 2018-11-12 RX ORDER — ERGOCALCIFEROL 1.25 MG/1
CAPSULE ORAL
Qty: 4 CAPSULE | Refills: 5 | Status: SHIPPED | OUTPATIENT
Start: 2018-11-12 | End: 2019-05-14 | Stop reason: SDUPTHER

## 2018-11-12 RX ORDER — LEVOTHYROXINE SODIUM 0.05 MG/1
TABLET ORAL
Qty: 30 TABLET | Refills: 5 | Status: SHIPPED | OUTPATIENT
Start: 2018-11-12 | End: 2019-05-14 | Stop reason: SDUPTHER

## 2018-11-12 NOTE — PROGRESS NOTES
Stable    She had worsening symptoms after being off SSRI. She is taking klonipin currently. Started on levothyroxine 25mcg initially ,   Cortisol 8.7     TSH 2.38  Vit D 41  Levothyroxine 50mcg    On Vit D 50,000IU twice a week   TSH 3.17   Vit D 52 on    50,000 IU weekly      Past Medical History:   Diagnosis Date    Back pain     Chicken pox     Depression     NO MEDS NOW OKAY    Fibromyalgia     Gastric ulcer, unspecified as acute or chronic, without mention of hemorrhage, perforation, or obstruction     GERD (gastroesophageal reflux disease)     Insomnia     Sleep study 1992:dxed w/interuption onset insomnia    Marijuana use     no further controlled substances while using marijuana.  Mixed hyperlipidemia 10/17/2011    SLIGHT ELEVATION NO MEDS    Mononucleosis     Panic attacks     Pap smear 2010;10/17/11;10/2012    Nml. Dr. Maddox(prior gyn)    Rectal cancer [facility]) 2017    Screening mammogram 2010;2011;2013    Nml.  Sleep apnea     DOESN'T KNOW SETTINGS    Subclinical hypothyroidism 2013    Thyroid nodules:Right 2013    Wheezing 2013     Past Surgical History:   Procedure Laterality Date     SECTION      CHOLECYSTECTOMY N/A 10/13/2015    DENTAL SURGERY      ENDOSCOPY, COLON, DIAGNOSTIC      LEFT COLECTOMY  2017    Amanda José MD, rectal cancer     OTHER SURGICAL HISTORY  10/13/15    LAPAROSCOPIC CHOLECYSTECTOMY    PILONIDAL CYST EXCISION      TONSILLECTOMY       Current Outpatient Prescriptions   Medication Sig Dispense Refill    levothyroxine (SYNTHROID) 50 MCG tablet TAKE ONE TABLET BY MOUTH DAILY 30 tablet 3    vitamin D (ERGOCALCIFEROL) 86175 units CAPS capsule TAKE ONE CAPSULE BY MOUTH TWICE WEEKLY 8 capsule 1    clonazePAM (KLONOPIN) 0.5 MG tablet TAKE ONE TABLET BY MOUTH TWICE A DAY.  60 tablet 2    gabapentin (NEURONTIN) 300 MG capsule Take 2 capsules by mouth nightly for 90 days.. 60 capsule 2     No current facility-administered medications for this visit. Review of Systems  Scanned, reviewed     Objective:      /69 (Site: Right Upper Arm, Position: Sitting, Cuff Size: Medium Adult)   Pulse 68   Resp 16   Ht 5' 1\" (1.549 m)   Wt 149 lb 12.8 oz (67.9 kg)   LMP  (LMP Unknown)   SpO2 99%   Breastfeeding? No   BMI 28.30 kg/m²   Wt Readings from Last 3 Encounters:   11/12/18 149 lb 12.8 oz (67.9 kg)   07/18/18 146 lb (66.2 kg)   04/09/18 153 lb (69.4 kg)       Constitutional: Well-developed, appears stated age, cooperative, in no acute distress  H/E/N/M/T:atraumatic, normocephalic, external ears, nose, lips normal without lesions  Neck: supple, symmetrical  Skin: No obvious rashes or lesions present. Psychiatric: Judgement and Insight:  judgement and insight appear normal  Neuro: Normal without focal findings, speech is normal normal, speech is spontaneous  Neck: thyroid not enlarged    Lab Review  Lab Results   Component Value Date    TSH 3.12 02/22/2018     No results found for: FREET4      Assessment: 1. Subclinical Hypothyroidism:                         She had a high TSH with low normal FT4 indicative of subclinical hypothyroidism. TPO antibodies are normal, but reviewed images, heterogenous gland indicative of Hashimotos. I discussed with her that with her levels, I do no think her anxiety is due to her thyroid, given her fatigue, weakness, cold intolerance,  started on levothyroxine, now biochemically euthyroid. She had question about T3, told her level was normal.Advised symptoms due to stress/depression  Recheck level    2. Thyroid Nodules: Reviewed images, two right sided sub centimeter nodules, ill defined, pseudo nodules? Recommend to monitor. 3.Anxiety/Depression: On klonipin as needed, does not want anti depressant, follow PCP, stress is better    4. Vitamin D deficiency: On replacement, taking weekly, level improved , continue, recheck

## 2018-12-13 ENCOUNTER — TELEPHONE (OUTPATIENT)
Dept: INTERNAL MEDICINE CLINIC | Age: 53
End: 2018-12-13

## 2018-12-14 ENCOUNTER — OFFICE VISIT (OUTPATIENT)
Dept: INTERNAL MEDICINE CLINIC | Age: 53
End: 2018-12-14
Payer: COMMERCIAL

## 2018-12-14 VITALS
BODY MASS INDEX: 27 KG/M2 | HEIGHT: 61 IN | OXYGEN SATURATION: 98 % | WEIGHT: 143 LBS | SYSTOLIC BLOOD PRESSURE: 100 MMHG | DIASTOLIC BLOOD PRESSURE: 64 MMHG | HEART RATE: 65 BPM

## 2018-12-14 DIAGNOSIS — M62.838 MUSCLE SPASM: ICD-10-CM

## 2018-12-14 DIAGNOSIS — M54.6 ACUTE BILATERAL THORACIC BACK PAIN: Primary | ICD-10-CM

## 2018-12-14 PROCEDURE — G8427 DOCREV CUR MEDS BY ELIG CLIN: HCPCS | Performed by: INTERNAL MEDICINE

## 2018-12-14 PROCEDURE — G8484 FLU IMMUNIZE NO ADMIN: HCPCS | Performed by: INTERNAL MEDICINE

## 2018-12-14 PROCEDURE — 99213 OFFICE O/P EST LOW 20 MIN: CPT | Performed by: INTERNAL MEDICINE

## 2018-12-14 PROCEDURE — G8417 CALC BMI ABV UP PARAM F/U: HCPCS | Performed by: INTERNAL MEDICINE

## 2018-12-14 PROCEDURE — 1036F TOBACCO NON-USER: CPT | Performed by: INTERNAL MEDICINE

## 2018-12-14 PROCEDURE — 3017F COLORECTAL CA SCREEN DOC REV: CPT | Performed by: INTERNAL MEDICINE

## 2018-12-14 RX ORDER — HYDROCODONE BITARTRATE AND ACETAMINOPHEN 5; 325 MG/1; MG/1
1 TABLET ORAL EVERY 6 HOURS PRN
Qty: 20 TABLET | Refills: 0 | Status: SHIPPED | OUTPATIENT
Start: 2018-12-14 | End: 2018-12-19

## 2018-12-14 RX ORDER — TIZANIDINE 2 MG/1
2 TABLET ORAL 3 TIMES DAILY PRN
Qty: 30 TABLET | Refills: 0 | Status: SHIPPED | OUTPATIENT
Start: 2018-12-14 | End: 2019-09-09 | Stop reason: SDUPTHER

## 2018-12-14 ASSESSMENT — ENCOUNTER SYMPTOMS
NAUSEA: 0
COUGH: 0
SHORTNESS OF BREATH: 0
CHEST TIGHTNESS: 0
ABDOMINAL PAIN: 0
EYE REDNESS: 0
BACK PAIN: 1

## 2018-12-14 ASSESSMENT — PATIENT HEALTH QUESTIONNAIRE - PHQ9
2. FEELING DOWN, DEPRESSED OR HOPELESS: 0
1. LITTLE INTEREST OR PLEASURE IN DOING THINGS: 0
SUM OF ALL RESPONSES TO PHQ QUESTIONS 1-9: 0
SUM OF ALL RESPONSES TO PHQ QUESTIONS 1-9: 0
SUM OF ALL RESPONSES TO PHQ9 QUESTIONS 1 & 2: 0

## 2019-01-18 ENCOUNTER — TELEPHONE (OUTPATIENT)
Dept: INTERNAL MEDICINE CLINIC | Age: 54
End: 2019-01-18

## 2019-01-18 DIAGNOSIS — M79.7 FIBROMYALGIA: Chronic | ICD-10-CM

## 2019-01-18 RX ORDER — GABAPENTIN 300 MG/1
600 CAPSULE ORAL NIGHTLY
Qty: 60 CAPSULE | Refills: 1 | OUTPATIENT
Start: 2019-01-18 | End: 2019-03-22 | Stop reason: SDUPTHER

## 2019-02-06 DIAGNOSIS — F41.9 ANXIETY: ICD-10-CM

## 2019-02-08 RX ORDER — CLONAZEPAM 0.5 MG/1
TABLET ORAL
Qty: 60 TABLET | Refills: 1 | OUTPATIENT
Start: 2019-02-08 | End: 2019-03-22 | Stop reason: SDUPTHER

## 2019-03-22 ENCOUNTER — OFFICE VISIT (OUTPATIENT)
Dept: INTERNAL MEDICINE CLINIC | Age: 54
End: 2019-03-22
Payer: COMMERCIAL

## 2019-03-22 VITALS
HEIGHT: 61 IN | SYSTOLIC BLOOD PRESSURE: 120 MMHG | WEIGHT: 144 LBS | BODY MASS INDEX: 27.19 KG/M2 | HEART RATE: 64 BPM | DIASTOLIC BLOOD PRESSURE: 70 MMHG | OXYGEN SATURATION: 98 % | RESPIRATION RATE: 14 BRPM

## 2019-03-22 DIAGNOSIS — M79.7 FIBROMYALGIA: Chronic | ICD-10-CM

## 2019-03-22 DIAGNOSIS — F41.9 ANXIETY: ICD-10-CM

## 2019-03-22 PROCEDURE — 99213 OFFICE O/P EST LOW 20 MIN: CPT | Performed by: INTERNAL MEDICINE

## 2019-03-22 RX ORDER — GABAPENTIN 300 MG/1
600 CAPSULE ORAL NIGHTLY
Qty: 60 CAPSULE | Refills: 1 | Status: SHIPPED | OUTPATIENT
Start: 2019-03-22 | End: 2019-05-18 | Stop reason: SDUPTHER

## 2019-03-22 RX ORDER — CLONAZEPAM 0.5 MG/1
TABLET ORAL
Qty: 60 TABLET | Refills: 1 | Status: SHIPPED | OUTPATIENT
Start: 2019-03-22 | End: 2019-07-12 | Stop reason: SDUPTHER

## 2019-03-22 ASSESSMENT — PATIENT HEALTH QUESTIONNAIRE - PHQ9
SUM OF ALL RESPONSES TO PHQ QUESTIONS 1-9: 0
2. FEELING DOWN, DEPRESSED OR HOPELESS: 0
SUM OF ALL RESPONSES TO PHQ QUESTIONS 1-9: 0
SUM OF ALL RESPONSES TO PHQ9 QUESTIONS 1 & 2: 0
1. LITTLE INTEREST OR PLEASURE IN DOING THINGS: 0

## 2019-03-22 ASSESSMENT — ENCOUNTER SYMPTOMS
EYE REDNESS: 0
SHORTNESS OF BREATH: 0
ABDOMINAL PAIN: 0
CHEST TIGHTNESS: 0
COUGH: 0
NAUSEA: 0
BACK PAIN: 0

## 2019-04-15 ENCOUNTER — HOSPITAL ENCOUNTER (OUTPATIENT)
Dept: ULTRASOUND IMAGING | Age: 54
Discharge: HOME | End: 2019-04-15
Payer: MEDICAID

## 2019-04-15 DIAGNOSIS — E06.3 HASHIMOTO'S DISEASE: Chronic | ICD-10-CM

## 2019-04-15 DIAGNOSIS — E03.8 SUBCLINICAL HYPOTHYROIDISM: ICD-10-CM

## 2019-04-15 DIAGNOSIS — E04.1 THYROID NODULE: ICD-10-CM

## 2019-04-15 LAB
TSH REFLEX: 1.74 UIU/ML (ref 0.27–4.2)
VITAMIN D 25-HYDROXY: 55.5 NG/ML

## 2019-04-15 PROCEDURE — 76536 US EXAM OF HEAD AND NECK: CPT

## 2019-05-14 ENCOUNTER — OFFICE VISIT (OUTPATIENT)
Dept: ENDOCRINOLOGY | Age: 54
End: 2019-05-14
Payer: COMMERCIAL

## 2019-05-14 VITALS
OXYGEN SATURATION: 99 % | HEART RATE: 63 BPM | BODY MASS INDEX: 26.85 KG/M2 | SYSTOLIC BLOOD PRESSURE: 111 MMHG | WEIGHT: 142.2 LBS | HEIGHT: 61 IN | DIASTOLIC BLOOD PRESSURE: 71 MMHG

## 2019-05-14 DIAGNOSIS — E55.9 VITAMIN D DEFICIENCY: ICD-10-CM

## 2019-05-14 DIAGNOSIS — E06.3 HASHIMOTO'S DISEASE: Chronic | ICD-10-CM

## 2019-05-14 DIAGNOSIS — E03.9 ACQUIRED HYPOTHYROIDISM: Primary | ICD-10-CM

## 2019-05-14 PROCEDURE — 99213 OFFICE O/P EST LOW 20 MIN: CPT | Performed by: INTERNAL MEDICINE

## 2019-05-14 RX ORDER — ERGOCALCIFEROL 1.25 MG/1
CAPSULE ORAL
Qty: 4 CAPSULE | Refills: 11 | Status: SHIPPED | OUTPATIENT
Start: 2019-05-14 | End: 2020-06-01

## 2019-05-14 RX ORDER — LEVOTHYROXINE SODIUM 0.05 MG/1
TABLET ORAL
Qty: 30 TABLET | Refills: 11 | Status: SHIPPED | OUTPATIENT
Start: 2019-05-14 | End: 2020-06-01

## 2019-05-14 NOTE — PROGRESS NOTES
Subjective:      48  y.o WF who is here for thyroid evaluation. Interim:     Stable  No issues    Initial complaints: Fatigue improved ,  sleep problems,    muscle and joint pain,  Anxiety improved,  confusion,cold intolerance, brain fog  History of obstructive symptoms:  difficulty swallowing No, changes in voice/hoarseness  No.    History of radiation to patient's neck:   No  Recent iodine exposure:  No  Family history includes mom and sister hypothyroidism. Family history of thyroid cancer:  No    Current smoking of cigarettes or cigars: No    TFT:  10/15 TSH 2.72 Vit D 23.8   6/15 TSH 2.18  Vitamin D 27  4/15 TSH 2.04 TPO 8 Vit D 12.4 start on vitamin D Replacement also taking D3 1000daily  11/14 TSH 2.42 increased to 50mcg  7/14  TSH 3.22 FT4 1.1  11/13 TSH 2.04 Free level normal  6/13  TSH 6.8  FT4  0.9 LTX 25mcg  6/13  TSH 3.75 FT4 0.7  3/13  TSH 3.5 FT4 0.85  3/13  TSH 6.03 FT4 0.77  9/11  TSH 3.53    Thyroid ultrasound 6/13     The right thyroid lobe is 3.5 x 1.3 x 1.3 cm in size. Left is 3.3   x 1.4 x 1.0 cm in size. Isthmus is 5 mm in thickness. Thyroid gland is mildly inhomogeneous. Two hypoechoic nodules are   demonstrated within the mid to upper right lobe, 9 x 6 mm and 8 x   6 mm. IMPRESSION:   2 nonspecific right thyroid hypoechoic nodules. Further   evaluation versus continued short-term followup would be   suggested. 11/14 Thyroid Ultrasound:  Right thyroid lobe measures 1.5 x 1.2 x 3.8 cm   Left lobe measures 1.3 x 0.9 x 3.4 cm. Two subtle nodules in the right thyroid lobe measure 9 x 6 x 5 mm   and 5 x 5 x 4 mm, without significant interval change. No   discrete nodule is present within the left lobe or within the   isthmus. Small benign-appearing lymph node is located inferior to   the left lobe with central fatty hilum and normal cortical   thickness, measuring under 2 mm. Stable    4/19 Stable nodules 6 and 5mm    She had worsening symptoms after being off SSRI. She is taking klonipin currently. Started on levothyroxine 25mcg initially ,   Cortisol 8.7     TSH 2.38  Vit D 41  Levothyroxine 50mcg    On Vit D 50,000IU twice a week   TSH 3.17   Vit D 52 on    50,000 IU weekly   TSH 1.74 Vit D 55.5    Past Medical History:   Diagnosis Date    Back pain     Chicken pox     Depression     NO MEDS NOW OKAY    Fibromyalgia     Gastric ulcer, unspecified as acute or chronic, without mention of hemorrhage, perforation, or obstruction     GERD (gastroesophageal reflux disease)     Insomnia     Sleep study 1992:dxed w/interuption onset insomnia    Marijuana use     no further controlled substances while using marijuana.  Mixed hyperlipidemia 10/17/2011    SLIGHT ELEVATION NO MEDS    Mononucleosis     Panic attacks     Pap smear 2010;10/17/11;10/2012    Nml. Dr. Maddox(prior gyn)    Rectal cancer [facility]) 2017    Screening mammogram 2010;2011;2013    Nml.  Sleep apnea     DOESN'T KNOW SETTINGS    Subclinical hypothyroidism 2013    Thyroid nodules:Right 2013    Wheezing 2013     Past Surgical History:   Procedure Laterality Date     SECTION      CHOLECYSTECTOMY N/A 10/13/2015    DENTAL SURGERY      ENDOSCOPY, COLON, DIAGNOSTIC      LEFT COLECTOMY  2017    Lazarus Allan, MD, rectal cancer     OTHER SURGICAL HISTORY  10/13/15    LAPAROSCOPIC CHOLECYSTECTOMY    PILONIDAL CYST EXCISION      TONSILLECTOMY       Current Outpatient Medications   Medication Sig Dispense Refill    clonazePAM (KLONOPIN) 0.5 MG tablet TAKE ONE TABLET BY MOUTH TWICE A DAY 60 tablet 1    gabapentin (NEURONTIN) 300 MG capsule Take 2 capsules by mouth nightly for 60 days.  60 capsule 1    tiZANidine (ZANAFLEX) 2 MG tablet Take 1 tablet by mouth 3 times daily as needed (muscle spasm) 30 tablet 0    levothyroxine (SYNTHROID) 50 MCG tablet TAKE ONE TABLET BY MOUTH DAILY 30 tablet 5    vitamin D (ERGOCALCIFEROL) 49898 units CAPS capsule TAKE ONE CAPSULE BY MOUTH WEEKLY 4 capsule 5     No current facility-administered medications for this visit. Review of Systems  Scanned, reviewed     Objective:      Ht 5' 1\" (1.549 m)   BMI 27.21 kg/m²   Wt Readings from Last 3 Encounters:   03/22/19 144 lb (65.3 kg)   12/14/18 143 lb (64.9 kg)   11/12/18 149 lb 12.8 oz (67.9 kg)       Constitutional: Well-developed, appears stated age, cooperative, in no acute distress  H/E/N/M/T:atraumatic, normocephalic, external ears, nose, lips normal without lesions  Eyes: Lids, lashes, conjunctivae and sclerae normal, No proptosis, no redness  Neck: supple, symmetrical, no swelling  Skin: No obvious rashes or lesions present. Skin and hair texture normal  Psychiatric: Judgement and Insight:  judgement and insight appear normal  Neuro: Normal without focal findings, speech is normal normal, speech is spontaneous  Chest: No labored breathing, no chest deformity, no stridor  Musculoskeletal: No joint deformity, swelling  Neck: thyroid not enlarged    Lab Review  Lab Results   Component Value Date    TSH 3.12 02/22/2018     No results found for: FREET4      Assessment: 1. Subclinical Hypothyroidism:                         She had a high TSH with low normal FT4 indicative of subclinical hypothyroidism. TPO antibodies are normal, but reviewed images, heterogenous gland indicative of Hashimotos. I discussed with her that with her levels, I do no think her anxiety is due to her thyroid, given her fatigue, weakness, cold intolerance,  started on levothyroxine, now biochemically euthyroid. She had question about T3, told her level was normal.Advised symptoms due to stress/depression    2. Thyroid Nodules: Reviewed images, two right sided sub centimeter nodules, ill defined, pseudo nodules? Recommend to monitor. USG 5/19 shows stable 5mm and 6mm nodule  3. Anxiety/Depression: On klonipin as needed, does not want anti depressant, follow PCP, stress is better    4. Vitamin D deficiency: On replacement, taking weekly, level improved , continue    Plan: 1. Continue levothyroxine,  50mcg one tab daily,  vitamin D once  a week  2. Check TFT and vitamin D before next visit  3. Thyroid Nodule f/u clinically, ultrasound before next visit  4. F/u in one year

## 2019-06-14 ENCOUNTER — HOSPITAL ENCOUNTER (INPATIENT)
Age: 54
LOS: 1 days | Discharge: HOME | DRG: 241 | End: 2019-06-15
Attending: EMERGENCY MEDICINE | Admitting: HOSPITALIST
Payer: COMMERCIAL

## 2019-06-14 ENCOUNTER — ANESTHESIA (OUTPATIENT)
Dept: ENDOSCOPY | Age: 54
DRG: 241 | End: 2019-06-14
Payer: COMMERCIAL

## 2019-06-14 ENCOUNTER — ANESTHESIA EVENT (OUTPATIENT)
Dept: ENDOSCOPY | Age: 54
DRG: 241 | End: 2019-06-14
Payer: COMMERCIAL

## 2019-06-14 ENCOUNTER — APPOINTMENT (OUTPATIENT)
Dept: CT IMAGING | Age: 54
DRG: 241 | End: 2019-06-14
Payer: COMMERCIAL

## 2019-06-14 DIAGNOSIS — K29.90 GASTRITIS AND DUODENITIS: Primary | ICD-10-CM

## 2019-06-14 PROBLEM — R10.9 ABDOMINAL PAIN: Status: ACTIVE | Noted: 2019-06-14

## 2019-06-14 LAB
ALBUMIN SERPL-MCNC: 4.3 G/DL (ref 3.4–5)
ALP BLD-CCNC: 85 U/L (ref 40–129)
ALT SERPL-CCNC: 9 U/L (ref 10–40)
ANION GAP SERPL CALCULATED.3IONS-SCNC: 13 MMOL/L (ref 3–16)
AST SERPL-CCNC: 16 U/L (ref 15–37)
BASE EXCESS VENOUS: 5 (ref -3–3)
BASOPHILS ABSOLUTE: 0 K/UL (ref 0–0.2)
BASOPHILS RELATIVE PERCENT: 0.3 %
BILIRUB SERPL-MCNC: <0.2 MG/DL (ref 0–1)
BILIRUBIN DIRECT: <0.2 MG/DL (ref 0–0.3)
BILIRUBIN, INDIRECT: ABNORMAL MG/DL (ref 0–1)
BUN BLDV-MCNC: 19 MG/DL (ref 7–20)
CALCIUM SERPL-MCNC: 10 MG/DL (ref 8.3–10.6)
CHLORIDE BLD-SCNC: 100 MMOL/L (ref 99–110)
CO2: 28 MMOL/L (ref 21–32)
CREAT SERPL-MCNC: 0.7 MG/DL (ref 0.6–1.1)
EKG ATRIAL RATE: 67 BPM
EKG DIAGNOSIS: NORMAL
EKG P AXIS: 81 DEGREES
EKG P-R INTERVAL: 174 MS
EKG Q-T INTERVAL: 414 MS
EKG QRS DURATION: 102 MS
EKG QTC CALCULATION (BAZETT): 437 MS
EKG R AXIS: 79 DEGREES
EKG T AXIS: 77 DEGREES
EKG VENTRICULAR RATE: 67 BPM
EOSINOPHILS ABSOLUTE: 0 K/UL (ref 0–0.6)
EOSINOPHILS RELATIVE PERCENT: 0.3 %
GFR AFRICAN AMERICAN: >60
GFR NON-AFRICAN AMERICAN: >60
GLUCOSE BLD-MCNC: 133 MG/DL (ref 70–99)
HCG(URINE) PREGNANCY TEST: NEGATIVE
HCO3 VENOUS: 27.6 MMOL/L (ref 23–29)
HCT VFR BLD CALC: 40 % (ref 36–48)
HEMOGLOBIN: 13.1 G/DL (ref 12–16)
LACTATE: 2.21 MMOL/L (ref 0.4–2)
LACTIC ACID: 2.5 MMOL/L (ref 0.4–2)
LIPASE: 106 U/L (ref 13–60)
LYMPHOCYTES ABSOLUTE: 2.2 K/UL (ref 1–5.1)
LYMPHOCYTES RELATIVE PERCENT: 18.1 %
MCH RBC QN AUTO: 30.4 PG (ref 26–34)
MCHC RBC AUTO-ENTMCNC: 32.8 G/DL (ref 31–36)
MCV RBC AUTO: 92.5 FL (ref 80–100)
MONOCYTES ABSOLUTE: 0.4 K/UL (ref 0–1.3)
MONOCYTES RELATIVE PERCENT: 3.4 %
NEUTROPHILS ABSOLUTE: 9.3 K/UL (ref 1.7–7.7)
NEUTROPHILS RELATIVE PERCENT: 77.9 %
O2 SAT, VEN: 94 %
PCO2, VEN: 34.5 MM HG (ref 40–50)
PDW BLD-RTO: 14 % (ref 12.4–15.4)
PERFORMED ON: ABNORMAL
PH VENOUS: 7.51 (ref 7.35–7.45)
PLATELET # BLD: 283 K/UL (ref 135–450)
PMV BLD AUTO: 9 FL (ref 5–10.5)
PO2, VEN: 64 MM HG
POC SAMPLE TYPE: ABNORMAL
POTASSIUM SERPL-SCNC: 3.9 MMOL/L (ref 3.5–5.1)
RBC # BLD: 4.33 M/UL (ref 4–5.2)
SODIUM BLD-SCNC: 141 MMOL/L (ref 136–145)
TCO2 CALC VENOUS: 29 MMOL/L
TOTAL PROTEIN: 7.1 G/DL (ref 6.4–8.2)
TSH REFLEX: 2.81 UIU/ML (ref 0.27–4.2)
WBC # BLD: 11.9 K/UL (ref 4–11)

## 2019-06-14 PROCEDURE — 0DB68ZX EXCISION OF STOMACH, VIA NATURAL OR ARTIFICIAL OPENING ENDOSCOPIC, DIAGNOSTIC: ICD-10-PCS | Performed by: INTERNAL MEDICINE

## 2019-06-14 PROCEDURE — 2580000003 HC RX 258: Performed by: EMERGENCY MEDICINE

## 2019-06-14 PROCEDURE — 7100000010 HC PHASE II RECOVERY - FIRST 15 MIN: Performed by: INTERNAL MEDICINE

## 2019-06-14 PROCEDURE — 6360000002 HC RX W HCPCS: Performed by: HOSPITALIST

## 2019-06-14 PROCEDURE — 96361 HYDRATE IV INFUSION ADD-ON: CPT

## 2019-06-14 PROCEDURE — 6360000002 HC RX W HCPCS: Performed by: EMERGENCY MEDICINE

## 2019-06-14 PROCEDURE — 85025 COMPLETE CBC W/AUTO DIFF WBC: CPT

## 2019-06-14 PROCEDURE — 7100000011 HC PHASE II RECOVERY - ADDTL 15 MIN: Performed by: INTERNAL MEDICINE

## 2019-06-14 PROCEDURE — 6370000000 HC RX 637 (ALT 250 FOR IP): Performed by: STUDENT IN AN ORGANIZED HEALTH CARE EDUCATION/TRAINING PROGRAM

## 2019-06-14 PROCEDURE — 6360000002 HC RX W HCPCS: Performed by: INTERNAL MEDICINE

## 2019-06-14 PROCEDURE — 93005 ELECTROCARDIOGRAM TRACING: CPT | Performed by: STUDENT IN AN ORGANIZED HEALTH CARE EDUCATION/TRAINING PROGRAM

## 2019-06-14 PROCEDURE — 96375 TX/PRO/DX INJ NEW DRUG ADDON: CPT

## 2019-06-14 PROCEDURE — 84703 CHORIONIC GONADOTROPIN ASSAY: CPT

## 2019-06-14 PROCEDURE — 80076 HEPATIC FUNCTION PANEL: CPT

## 2019-06-14 PROCEDURE — 99222 1ST HOSP IP/OBS MODERATE 55: CPT | Performed by: HOSPITALIST

## 2019-06-14 PROCEDURE — 93010 ELECTROCARDIOGRAM REPORT: CPT | Performed by: INTERNAL MEDICINE

## 2019-06-14 PROCEDURE — 84443 ASSAY THYROID STIM HORMONE: CPT

## 2019-06-14 PROCEDURE — 2709999900 HC NON-CHARGEABLE SUPPLY: Performed by: INTERNAL MEDICINE

## 2019-06-14 PROCEDURE — 80048 BASIC METABOLIC PNL TOTAL CA: CPT

## 2019-06-14 PROCEDURE — 99152 MOD SED SAME PHYS/QHP 5/>YRS: CPT | Performed by: INTERNAL MEDICINE

## 2019-06-14 PROCEDURE — 2580000003 HC RX 258: Performed by: STUDENT IN AN ORGANIZED HEALTH CARE EDUCATION/TRAINING PROGRAM

## 2019-06-14 PROCEDURE — 96374 THER/PROPH/DIAG INJ IV PUSH: CPT

## 2019-06-14 PROCEDURE — 6360000002 HC RX W HCPCS: Performed by: ANESTHESIOLOGY

## 2019-06-14 PROCEDURE — C9113 INJ PANTOPRAZOLE SODIUM, VIA: HCPCS | Performed by: STUDENT IN AN ORGANIZED HEALTH CARE EDUCATION/TRAINING PROGRAM

## 2019-06-14 PROCEDURE — C9113 INJ PANTOPRAZOLE SODIUM, VIA: HCPCS | Performed by: INTERNAL MEDICINE

## 2019-06-14 PROCEDURE — 3609012400 HC EGD TRANSORAL BIOPSY SINGLE/MULTIPLE: Performed by: INTERNAL MEDICINE

## 2019-06-14 PROCEDURE — 6360000002 HC RX W HCPCS: Performed by: STUDENT IN AN ORGANIZED HEALTH CARE EDUCATION/TRAINING PROGRAM

## 2019-06-14 PROCEDURE — 6360000004 HC RX CONTRAST MEDICATION: Performed by: EMERGENCY MEDICINE

## 2019-06-14 PROCEDURE — 88342 IMHCHEM/IMCYTCHM 1ST ANTB: CPT

## 2019-06-14 PROCEDURE — 99285 EMERGENCY DEPT VISIT HI MDM: CPT

## 2019-06-14 PROCEDURE — 82803 BLOOD GASES ANY COMBINATION: CPT

## 2019-06-14 PROCEDURE — 88305 TISSUE EXAM BY PATHOLOGIST: CPT

## 2019-06-14 PROCEDURE — 74177 CT ABD & PELVIS W/CONTRAST: CPT

## 2019-06-14 PROCEDURE — 1200000000 HC SEMI PRIVATE

## 2019-06-14 PROCEDURE — 36415 COLL VENOUS BLD VENIPUNCTURE: CPT

## 2019-06-14 PROCEDURE — 83690 ASSAY OF LIPASE: CPT

## 2019-06-14 PROCEDURE — 83605 ASSAY OF LACTIC ACID: CPT

## 2019-06-14 RX ORDER — SCOLOPAMINE TRANSDERMAL SYSTEM 1 MG/1
1 PATCH, EXTENDED RELEASE TRANSDERMAL
Status: DISCONTINUED | OUTPATIENT
Start: 2019-06-14 | End: 2019-06-15 | Stop reason: HOSPADM

## 2019-06-14 RX ORDER — CLONAZEPAM 0.5 MG/1
0.5 TABLET ORAL 2 TIMES DAILY
Status: DISCONTINUED | OUTPATIENT
Start: 2019-06-14 | End: 2019-06-15 | Stop reason: HOSPADM

## 2019-06-14 RX ORDER — LEVOTHYROXINE SODIUM 0.05 MG/1
50 TABLET ORAL
Status: DISCONTINUED | OUTPATIENT
Start: 2019-06-14 | End: 2019-06-15 | Stop reason: HOSPADM

## 2019-06-14 RX ORDER — HYDRALAZINE HYDROCHLORIDE 20 MG/ML
5 INJECTION INTRAMUSCULAR; INTRAVENOUS EVERY 10 MIN PRN
Status: DISCONTINUED | OUTPATIENT
Start: 2019-06-14 | End: 2019-06-14 | Stop reason: HOSPADM

## 2019-06-14 RX ORDER — ONDANSETRON 2 MG/ML
4 INJECTION INTRAMUSCULAR; INTRAVENOUS EVERY 6 HOURS PRN
Status: DISCONTINUED | OUTPATIENT
Start: 2019-06-14 | End: 2019-06-15 | Stop reason: HOSPADM

## 2019-06-14 RX ORDER — OXYCODONE HYDROCHLORIDE 5 MG/1
5 TABLET ORAL PRN
Status: DISCONTINUED | OUTPATIENT
Start: 2019-06-14 | End: 2019-06-14 | Stop reason: HOSPADM

## 2019-06-14 RX ORDER — SODIUM CHLORIDE 0.9 % (FLUSH) 0.9 %
10 SYRINGE (ML) INJECTION PRN
Status: DISCONTINUED | OUTPATIENT
Start: 2019-06-14 | End: 2019-06-15 | Stop reason: HOSPADM

## 2019-06-14 RX ORDER — GABAPENTIN 300 MG/1
600 CAPSULE ORAL NIGHTLY
Status: DISCONTINUED | OUTPATIENT
Start: 2019-06-14 | End: 2019-06-15 | Stop reason: HOSPADM

## 2019-06-14 RX ORDER — MIDAZOLAM HYDROCHLORIDE 1 MG/ML
INJECTION INTRAMUSCULAR; INTRAVENOUS PRN
Status: DISCONTINUED | OUTPATIENT
Start: 2019-06-14 | End: 2019-06-14 | Stop reason: ALTCHOICE

## 2019-06-14 RX ORDER — PANTOPRAZOLE SODIUM 40 MG/10ML
40 INJECTION, POWDER, LYOPHILIZED, FOR SOLUTION INTRAVENOUS DAILY
Status: DISCONTINUED | OUTPATIENT
Start: 2019-06-14 | End: 2019-06-14

## 2019-06-14 RX ORDER — SODIUM CHLORIDE 9 MG/ML
INJECTION, SOLUTION INTRAVENOUS CONTINUOUS
Status: DISCONTINUED | OUTPATIENT
Start: 2019-06-14 | End: 2019-06-15

## 2019-06-14 RX ORDER — DICYCLOMINE HYDROCHLORIDE 10 MG/1
10 CAPSULE ORAL 3 TIMES DAILY
Status: DISCONTINUED | OUTPATIENT
Start: 2019-06-14 | End: 2019-06-14

## 2019-06-14 RX ORDER — 0.9 % SODIUM CHLORIDE 0.9 %
500 INTRAVENOUS SOLUTION INTRAVENOUS ONCE
Status: COMPLETED | OUTPATIENT
Start: 2019-06-14 | End: 2019-06-14

## 2019-06-14 RX ORDER — LABETALOL 20 MG/4 ML (5 MG/ML) INTRAVENOUS SYRINGE
5 EVERY 10 MIN PRN
Status: DISCONTINUED | OUTPATIENT
Start: 2019-06-14 | End: 2019-06-14 | Stop reason: HOSPADM

## 2019-06-14 RX ORDER — MEPERIDINE HYDROCHLORIDE 25 MG/ML
12.5 INJECTION INTRAMUSCULAR; INTRAVENOUS; SUBCUTANEOUS EVERY 5 MIN PRN
Status: DISCONTINUED | OUTPATIENT
Start: 2019-06-14 | End: 2019-06-14 | Stop reason: HOSPADM

## 2019-06-14 RX ORDER — PROMETHAZINE HYDROCHLORIDE 25 MG/ML
12.5 INJECTION, SOLUTION INTRAMUSCULAR; INTRAVENOUS EVERY 4 HOURS
Status: DISCONTINUED | OUTPATIENT
Start: 2019-06-14 | End: 2019-06-15 | Stop reason: HOSPADM

## 2019-06-14 RX ORDER — FENTANYL CITRATE 50 UG/ML
50 INJECTION, SOLUTION INTRAMUSCULAR; INTRAVENOUS ONCE
Status: COMPLETED | OUTPATIENT
Start: 2019-06-14 | End: 2019-06-14

## 2019-06-14 RX ORDER — PROMETHAZINE HYDROCHLORIDE 25 MG/ML
6.25 INJECTION, SOLUTION INTRAMUSCULAR; INTRAVENOUS
Status: DISCONTINUED | OUTPATIENT
Start: 2019-06-14 | End: 2019-06-14 | Stop reason: HOSPADM

## 2019-06-14 RX ORDER — SENNOSIDES 8.8 MG/5ML
5 LIQUID ORAL 2 TIMES DAILY PRN
Status: DISCONTINUED | OUTPATIENT
Start: 2019-06-14 | End: 2019-06-15 | Stop reason: HOSPADM

## 2019-06-14 RX ORDER — ONDANSETRON 2 MG/ML
4 INJECTION INTRAMUSCULAR; INTRAVENOUS ONCE
Status: COMPLETED | OUTPATIENT
Start: 2019-06-14 | End: 2019-06-14

## 2019-06-14 RX ORDER — PROMETHAZINE HYDROCHLORIDE 25 MG/ML
12.5 INJECTION, SOLUTION INTRAMUSCULAR; INTRAVENOUS ONCE
Status: COMPLETED | OUTPATIENT
Start: 2019-06-14 | End: 2019-06-14

## 2019-06-14 RX ORDER — SODIUM CHLORIDE 0.9 % (FLUSH) 0.9 %
10 SYRINGE (ML) INJECTION EVERY 12 HOURS SCHEDULED
Status: DISCONTINUED | OUTPATIENT
Start: 2019-06-14 | End: 2019-06-15 | Stop reason: HOSPADM

## 2019-06-14 RX ORDER — TIZANIDINE 4 MG/1
2 TABLET ORAL 3 TIMES DAILY PRN
Status: DISCONTINUED | OUTPATIENT
Start: 2019-06-14 | End: 2019-06-15 | Stop reason: HOSPADM

## 2019-06-14 RX ORDER — PROCHLORPERAZINE EDISYLATE 5 MG/ML
10 INJECTION INTRAMUSCULAR; INTRAVENOUS EVERY 6 HOURS PRN
Status: DISCONTINUED | OUTPATIENT
Start: 2019-06-14 | End: 2019-06-15 | Stop reason: HOSPADM

## 2019-06-14 RX ORDER — PROMETHAZINE HYDROCHLORIDE 25 MG/ML
12.5 INJECTION, SOLUTION INTRAMUSCULAR; INTRAVENOUS EVERY 4 HOURS PRN
Status: DISCONTINUED | OUTPATIENT
Start: 2019-06-14 | End: 2019-06-14

## 2019-06-14 RX ORDER — PANTOPRAZOLE SODIUM 40 MG/10ML
40 INJECTION, POWDER, LYOPHILIZED, FOR SOLUTION INTRAVENOUS 2 TIMES DAILY
Status: DISCONTINUED | OUTPATIENT
Start: 2019-06-14 | End: 2019-06-15 | Stop reason: HOSPADM

## 2019-06-14 RX ORDER — OXYCODONE HYDROCHLORIDE 5 MG/1
10 TABLET ORAL PRN
Status: DISCONTINUED | OUTPATIENT
Start: 2019-06-14 | End: 2019-06-14 | Stop reason: HOSPADM

## 2019-06-14 RX ORDER — ACETAMINOPHEN 325 MG/1
650 TABLET ORAL EVERY 4 HOURS PRN
Status: DISCONTINUED | OUTPATIENT
Start: 2019-06-14 | End: 2019-06-15 | Stop reason: HOSPADM

## 2019-06-14 RX ORDER — MEPERIDINE HYDROCHLORIDE 50 MG/ML
INJECTION INTRAMUSCULAR; INTRAVENOUS; SUBCUTANEOUS PRN
Status: DISCONTINUED | OUTPATIENT
Start: 2019-06-14 | End: 2019-06-14 | Stop reason: ALTCHOICE

## 2019-06-14 RX ORDER — DIPHENHYDRAMINE HYDROCHLORIDE 50 MG/ML
12.5 INJECTION INTRAMUSCULAR; INTRAVENOUS
Status: DISCONTINUED | OUTPATIENT
Start: 2019-06-14 | End: 2019-06-14 | Stop reason: HOSPADM

## 2019-06-14 RX ORDER — DROPERIDOL 2.5 MG/ML
1.25 INJECTION, SOLUTION INTRAMUSCULAR; INTRAVENOUS ONCE
Status: COMPLETED | OUTPATIENT
Start: 2019-06-14 | End: 2019-06-14

## 2019-06-14 RX ORDER — SODIUM CHLORIDE, SODIUM LACTATE, POTASSIUM CHLORIDE, AND CALCIUM CHLORIDE .6; .31; .03; .02 G/100ML; G/100ML; G/100ML; G/100ML
1000 INJECTION, SOLUTION INTRAVENOUS ONCE
Status: COMPLETED | OUTPATIENT
Start: 2019-06-14 | End: 2019-06-14

## 2019-06-14 RX ORDER — METOCLOPRAMIDE HYDROCHLORIDE 5 MG/ML
10 INJECTION INTRAMUSCULAR; INTRAVENOUS
Status: DISCONTINUED | OUTPATIENT
Start: 2019-06-14 | End: 2019-06-14 | Stop reason: HOSPADM

## 2019-06-14 RX ORDER — ERGOCALCIFEROL 1.25 MG/1
50000 CAPSULE ORAL WEEKLY
Status: DISCONTINUED | OUTPATIENT
Start: 2019-06-18 | End: 2019-06-15 | Stop reason: HOSPADM

## 2019-06-14 RX ADMIN — Medication 10 ML: at 22:32

## 2019-06-14 RX ADMIN — GABAPENTIN 600 MG: 300 CAPSULE ORAL at 22:31

## 2019-06-14 RX ADMIN — PROCHLORPERAZINE EDISYLATE 10 MG: 5 INJECTION INTRAMUSCULAR; INTRAVENOUS at 19:03

## 2019-06-14 RX ADMIN — IOPAMIDOL 80 ML: 755 INJECTION, SOLUTION INTRAVENOUS at 04:14

## 2019-06-14 RX ADMIN — LEVOTHYROXINE SODIUM 50 MCG: 50 TABLET ORAL at 08:37

## 2019-06-14 RX ADMIN — PROMETHAZINE HYDROCHLORIDE 12.5 MG: 25 INJECTION INTRAMUSCULAR; INTRAVENOUS at 17:19

## 2019-06-14 RX ADMIN — PROMETHAZINE HYDROCHLORIDE 12.5 MG: 25 INJECTION INTRAMUSCULAR; INTRAVENOUS at 12:49

## 2019-06-14 RX ADMIN — ONDANSETRON 4 MG: 2 INJECTION INTRAMUSCULAR; INTRAVENOUS at 07:25

## 2019-06-14 RX ADMIN — PROMETHAZINE HYDROCHLORIDE 12.5 MG: 25 INJECTION INTRAMUSCULAR; INTRAVENOUS at 05:50

## 2019-06-14 RX ADMIN — DROPERIDOL 1.25 MG: 2.5 INJECTION, SOLUTION INTRAMUSCULAR; INTRAVENOUS at 04:45

## 2019-06-14 RX ADMIN — PROMETHAZINE HYDROCHLORIDE 12.5 MG: 25 INJECTION INTRAMUSCULAR; INTRAVENOUS at 08:37

## 2019-06-14 RX ADMIN — FENTANYL CITRATE 50 MCG: 50 INJECTION INTRAMUSCULAR; INTRAVENOUS at 03:25

## 2019-06-14 RX ADMIN — CLONAZEPAM 0.5 MG: 0.5 TABLET ORAL at 22:31

## 2019-06-14 RX ADMIN — ONDANSETRON 4 MG: 2 INJECTION INTRAMUSCULAR; INTRAVENOUS at 14:51

## 2019-06-14 RX ADMIN — PANTOPRAZOLE SODIUM 40 MG: 40 INJECTION, POWDER, FOR SOLUTION INTRAVENOUS at 22:31

## 2019-06-14 RX ADMIN — SODIUM CHLORIDE 500 ML: 9 INJECTION, SOLUTION INTRAVENOUS at 05:50

## 2019-06-14 RX ADMIN — SODIUM CHLORIDE: 9 INJECTION, SOLUTION INTRAVENOUS at 17:22

## 2019-06-14 RX ADMIN — DICYCLOMINE HYDROCHLORIDE 10 MG: 10 CAPSULE ORAL at 08:37

## 2019-06-14 RX ADMIN — SODIUM CHLORIDE, POTASSIUM CHLORIDE, SODIUM LACTATE AND CALCIUM CHLORIDE 1000 ML: 600; 310; 30; 20 INJECTION, SOLUTION INTRAVENOUS at 03:25

## 2019-06-14 RX ADMIN — ONDANSETRON 4 MG: 2 INJECTION INTRAMUSCULAR; INTRAVENOUS at 03:25

## 2019-06-14 RX ADMIN — ENOXAPARIN SODIUM 40 MG: 40 INJECTION SUBCUTANEOUS at 08:37

## 2019-06-14 RX ADMIN — PANTOPRAZOLE SODIUM 40 MG: 40 INJECTION, POWDER, FOR SOLUTION INTRAVENOUS at 08:38

## 2019-06-14 RX ADMIN — SODIUM CHLORIDE: 9 INJECTION, SOLUTION INTRAVENOUS at 07:21

## 2019-06-14 RX ADMIN — PROMETHAZINE HYDROCHLORIDE 12.5 MG: 25 INJECTION INTRAMUSCULAR; INTRAVENOUS at 22:31

## 2019-06-14 RX ADMIN — TIZANIDINE 2 MG: 4 TABLET ORAL at 07:25

## 2019-06-14 ASSESSMENT — ENCOUNTER SYMPTOMS
VOMITING: 1
NAUSEA: 1
COLOR CHANGE: 0
TROUBLE SWALLOWING: 0
BLOOD IN STOOL: 0
CONSTIPATION: 1
RESPIRATORY NEGATIVE: 1
DIARRHEA: 0
EYES NEGATIVE: 1
CONSTIPATION: 0
ABDOMINAL PAIN: 1
SHORTNESS OF BREATH: 0

## 2019-06-14 ASSESSMENT — PAIN - FUNCTIONAL ASSESSMENT
PAIN_FUNCTIONAL_ASSESSMENT: 0-10

## 2019-06-14 ASSESSMENT — PAIN SCALES - WONG BAKER
WONGBAKER_NUMERICALRESPONSE: 8
WONGBAKER_NUMERICALRESPONSE: 0

## 2019-06-14 ASSESSMENT — PAIN DESCRIPTION - LOCATION: LOCATION: ABDOMEN

## 2019-06-14 ASSESSMENT — PAIN SCALES - GENERAL
PAINLEVEL_OUTOF10: 7
PAINLEVEL_OUTOF10: 0
PAINLEVEL_OUTOF10: 8
PAINLEVEL_OUTOF10: 0
PAINLEVEL_OUTOF10: 9

## 2019-06-14 ASSESSMENT — PAIN DESCRIPTION - DESCRIPTORS: DESCRIPTORS: CRAMPING

## 2019-06-14 ASSESSMENT — PAIN DESCRIPTION - FREQUENCY: FREQUENCY: INTERMITTENT

## 2019-06-14 ASSESSMENT — PAIN DESCRIPTION - PAIN TYPE: TYPE: ACUTE PAIN

## 2019-06-14 NOTE — OP NOTE
Procedure and Consults  EGD with biopsy for histology  Indications  Vomiting  Consent  The anesthesia and procedure along with the risks, benefits and alternatives of each were explained by the physician  and nurse to the patient to their satisfaction and ample opportunities to ask questions was provided. We discussed  the risks of bleeding, perforation, anesthesia reaction as well as the alternatives, and missed lesions. Verbal and  written consent were obtained with the nurses present. Monitoring  The patient was monitored with continuous pulse oximetry, heart rate monitoring and intermittent blood pressure  monitoring throughout the procedure. Procedure Medications  MAC  Details of Procedure  The upper endoscope was inserted atraumatically into the esophagus and advanced under direct vision to the second  portion of the duodenum. The scope was slowly withdrawn carefully inspecting the duodenal bulb, entire stomach in  both forward and retroflexed views and esophagus. No abnormalities were found in the esophagus. No erosions, ulcerations, columnar lined epithelia, stricture, rings or  masses were noted. Nonerosive gastritis was noted in the diffusely in stomach. Cold biopsy was performed. Duodenum was normal.  Post Op Diagnosis  - Normal esophagus as above without signs of esophagitis, columnar lined epithelia, or mass  - Nonerosive gastritis, diffusely in stomach. Biopsy performed. Recommendations  - Resume diet. - Follow biopsy results. - PPI  - Symptomatic Rx. Thank you and please let me know if there are any additional questions or concerns.     Rona Marin

## 2019-06-14 NOTE — PROGRESS NOTES
Progress Note    Admit Date: 6/14/2019  Day: 1  Diet: Diet NPO Effective Now Exceptions are: Sips with Meds    CC: abdominal pain, nausea, vomiting      Interval history: Patient has been treated with Zofran and phenergan IV without relief of nausea/vomiting or abdominal pain. Her abdominal pain is moderate-sever and is mostly epigastric and, \"crampy. \" Her emesis has been bilious and she has had no PO intake. She denies diarrhea or constipation, last BM yesterday. No GI bleeding, urinary problems. Please see further ROS below. HPI: Ms. Cesar Herman is a 49 yo F with PMHx significant for rectal cancer and hypothyroidism who presents to [facility] ED complaining of intractable nausea, vomiting, and abdominal pain x 1 day. Medications:     Scheduled Meds:   clonazePAM  0.5 mg Oral BID    gabapentin  600 mg Oral Nightly    levothyroxine  50 mcg Oral QAM AC    [START ON 6/18/2019] vitamin D  50,000 Units Oral Weekly    sodium chloride flush  10 mL Intravenous 2 times per day    enoxaparin  40 mg Subcutaneous Daily    pantoprazole  40 mg Intravenous Daily    dicyclomine  10 mg Oral TID    promethazine  12.5 mg Intravenous Q4H     Continuous Infusions:   sodium chloride 100 mL/hr at 06/14/19 0721     PRN Meds:tiZANidine, sodium chloride flush, ondansetron, senna, acetaminophen    Objective:   Vitals:   T-max:  Patient Vitals for the past 8 hrs:   BP Temp Temp src Pulse Resp SpO2   06/14/19 0652 129/65 98.7 °F (37.1 °C) Oral 62 18 93 %   06/14/19 0530 (!) 143/94 -- -- -- -- 100 %   06/14/19 0306 97/69 98.3 °F (36.8 °C) Oral 73 16 99 %     No intake or output data in the 24 hours ending 06/14/19 0950    Review of Systems   Constitutional: Positive for activity change. Negative for chills and fever. HENT: Negative for trouble swallowing. Eyes: Negative for visual disturbance. Respiratory: Negative for shortness of breath. Cardiovascular: Negative for chest pain, palpitations and leg swelling.    Gastrointestinal: Positive for abdominal pain, nausea and vomiting. Negative for blood in stool, constipation and diarrhea. Genitourinary: Negative for difficulty urinating. Musculoskeletal: Negative for myalgias. Skin: Negative for color change. Neurological: Negative for dizziness, weakness, light-headedness, numbness and headaches. Psychiatric/Behavioral: Negative for confusion. Physical Exam   Constitutional: She appears well-developed and well-nourished. She appears distressed. HENT:   Head: Normocephalic and atraumatic. Neck: Normal range of motion. Neck supple. Cardiovascular: Normal rate, regular rhythm, normal heart sounds and intact distal pulses. Exam reveals no gallop and no friction rub. No murmur heard. Pulmonary/Chest: Effort normal and breath sounds normal. No stridor. No respiratory distress. She has no wheezes. She has no rales. Abdominal: Soft. Bowel sounds are normal. She exhibits no distension. There is tenderness (midline epigastric). There is no rebound and no guarding. Musculoskeletal: Normal range of motion. She exhibits no edema. Neurological: She is alert. She is not disoriented. Skin: Skin is warm and dry. Capillary refill takes less than 2 seconds. Psychiatric: She has a normal mood and affect.        LABS:    CBC:   Recent Labs     06/14/19 0335   WBC 11.9*   HGB 13.1   HCT 40.0      MCV 92.5     Renal:    Recent Labs     06/14/19 0335      K 3.9      CO2 28   BUN 19   CREATININE 0.7   GLUCOSE 133*   CALCIUM 10.0   ANIONGAP 13     Hepatic:   Recent Labs     06/14/19 0335   AST 16   ALT 9*   BILITOT <0.2   BILIDIR <0.2   PROT 7.1   LABALBU 4.3   ALKPHOS 85     LIPASE:    Lab Results   Component Value Date    LIPASE 106.0 06/14/2019     Lactate:   Recent Labs     06/14/19 0335   LACTATE 2.21*     Cultures: none  -----------------------------------------------------------------  RAD:   CT ABDOMEN PELVIS W IV CONTRAST Additional Contrast? None   Final

## 2019-06-14 NOTE — H&P
 Not on file   Social History Narrative    Not on file       Family Hx:   Family History   Problem Relation Age of Onset    High Cholesterol Mother     High Blood Pressure Father     Heart Disease Father     COPD Father     Stroke Maternal Grandfather     Thyroid Disease Sister         Hypothyroidism       Physical Exam:  Vital Signs: BP (!) 143/78   Pulse 80   Temp 98.7 °F (37.1 °C) (Oral)   Resp 24   LMP 11/14/2017   SpO2 99%    Airway: Mallampati: II (soft palate, uvula, fauces visible)  Pulmonary:Normal  Cardiac:Normal  Abdomen:Normal    Pre-Procedure Assessment / Plan:  ASA: Class 2 - A normal healthy patient with mild systemic disease  Level of Sedation Plan: Moderate sedation  Post Procedure plan: Return to same level of care    I assessed the patient and find that the patient is in satisfactory condition to proceed with the planned procedure and sedation plan. I have explained the risk, benefits, and alternatives to the procedure; the patient understands and agrees to proceed.        Yancy Ordoñez  6/14/2019

## 2019-06-14 NOTE — ED NOTES
Patient states her pain is coming back and she is still nauseated. States that Zofran does not work for her.       Aracelis Mast RN  06/14/19 0994

## 2019-06-14 NOTE — H&P
Internal Medicine  PGY 1  History & Physical      CC abdominal pain, nausea, vomiting     History Obtained From:  patient    HISTORY OF PRESENT ILLNESS:  Ms. Mila Caceres is a 47 yo F with PMHx significant for rectal cancer and hypothyroidism who presents to [facility] ED complaining of intractable nausea, vomiting, and abdominal pain x 1 day. She describes the abdominal pain as crampy and is localized diffusely across her abdomen. She has associated nausea and non-bloody non-bilious vomiting which is made worse with PO intake. She also complains of constipation x 1 day and reports that she is unable to pass gas. She denies any recent travel, new foods, new medications or sick contacts. While in the ED, BP 97/69, HR 73, Afebrile, RR 16. Basic labs showed a slight leukocytosis of 11.9. Hemoglobin stable at 13.1. BMP WNL. Hepatic function panel unremarkable. Lipase slightly elevated at 106. CT scan of abdomen was obtained which showed gastritis as well as presacral fat density with a focal gas collection   noted internally; however, this seems to appear on prior imaging. Patient was given zofran, fentanyl and phenergan which provided some relief. Past Medical History:        Diagnosis Date    Back pain     Chicken pox     Depression 1992    NO MEDS NOW OKAY    Fibromyalgia     Gastric ulcer, unspecified as acute or chronic, without mention of hemorrhage, perforation, or obstruction 1989    GERD (gastroesophageal reflux disease)     Insomnia 1992    Sleep study 1992:dxed w/interuption onset insomnia    Marijuana use     no further controlled substances while using marijuana.  Mixed hyperlipidemia 10/17/2011    SLIGHT ELEVATION NO MEDS    Mononucleosis     Panic attacks 2013    Pap smear 7/2010;10/17/11;10/2012    Nml. Dr. Maddox(prior gyn)    Rectal cancer [facility]) 12/01/2017    Screening mammogram 8/2010;11/2011;2/12/2013    Nml.      Sleep apnea     DOESN'T KNOW SETTINGS    Subclinical hypothyroidism 2013    Thyroid nodules:Right 2013    Wheezing 2013   ·     Past Surgical History:        Procedure Laterality Date     SECTION      CHOLECYSTECTOMY N/A 10/13/2015   [facility] DENT[facility]GERY      ENDOSCOPY, COLON, DIAGNOSTIC      LEFT COLECTOMY  2017    Tahmina Tejeda MD, rectal cancer     OTHER SURGICAL HISTORY  10/13/15    LAPAROSCOPIC CHOLECYSTECTOMY    PILONIDAL CYST EXCISION      TONSILLECTOMY     ·     Medications Priorto Admission:    · Not in a hospital admission. Allergies:  Lamictal [lamotrigine]; Morphine; Naproxen; and Sulfa antibiotics    Social History:   · TOBACCO:   reports that she quit smoking about 10 years ago. Her smoking use included cigarettes. She has a 30.00 pack-year smoking history. She has never used smokeless tobacco.  · ETOH:   reports that she drinks about 0.6 oz of alcohol per week. · DRUGS : denied   · Patient currently lives at home   ·   Family History:       Problem Relation Age of Onset    High Cholesterol Mother     High Blood Pressure Father     Heart Disease Father     COPD Father     Stroke Maternal Grandfather     Thyroid Disease Sister         Hypothyroidism   ·     Review of Systems   Constitutional: Negative. HENT: Negative. Respiratory: Negative. Cardiovascular: Negative. Gastrointestinal: Positive for abdominal pain, constipation, nausea and vomiting. Genitourinary: Negative. Musculoskeletal: Negative. ROS: A 10 point review of systems was conducted, significant findings as noted in HPI. Physical Exam   Constitutional: She is oriented to person, place, and time. She appears well-developed and well-nourished. HENT:   Head: Normocephalic. Eyes: Pupils are equal, round, and reactive to light. Neck: Normal range of motion. Cardiovascular: Normal rate and regular rhythm. Pulmonary/Chest: Effort normal and breath sounds normal.   Abdominal:   Distended but soft. Bowel sounds appreciated.  No tenderness to palpation. No guarding or rebound pain   Musculoskeletal: Normal range of motion. Neurological: She is alert and oriented to person, place, and time. Skin: Skin is warm and dry. Physical exam:       Vitals:    06/14/19 0306   BP: 97/69   Pulse: 73   Resp: 16   Temp: 98.3 °F (36.8 °C)   SpO2: 99%       DATA:    Labs:  CBC:   Recent Labs     06/14/19 0335   WBC 11.9*   HGB 13.1   HCT 40.0          BMP:   Recent Labs     06/14/19 0335      K 3.9      CO2 28   BUN 19   CREATININE 0.7   GLUCOSE 133*     LFT's:   Recent Labs     06/14/19 0335   AST 16   ALT 9*   BILITOT <0.2   ALKPHOS 85     Troponin: No results for input(s): TROPONINI in the last 72 hours. BNP:No results for input(s): BNP in the last 72 hours. ABGs: No results for input(s): PHART, HPE2UDD, PO2ART in the last 72 hours. INR: No results for input(s): INR in the last 72 hours. U/A:No results for input(s): NITRITE, COLORU, PHUR, LABCAST, WBCUA, RBCUA, MUCUS, TRICHOMONAS, YEAST, BACTERIA, CLARITYU, SPECGRAV, LEUKOCYTESUR, UROBILINOGEN, BILIRUBINUR, BLOODU, GLUCOSEU, AMORPHOUS in the last 72 hours. Invalid input(s): KETONESU    CT ABDOMEN PELVIS W IV CONTRAST Additional Contrast? None   Final Result   1. There is asymmetric mucosal thickening of the distal body and antrum    of the stomach. This may be due to underdistention. Distal gastritis is    also a consideration. No high-grade gastric obstruction with a stomach    predominantly decompressed. No evidence for perforation. 2.  There is increased presacral fat density with a focal gas collection    noted internally. The gas collection does not definitively communicate    with the adjacent sigmoid colon. This may represent a subtle atypical    diverticulum or fistulous communication with the adjacent postoperative    sigmoid colon. No definite fluid collection is identified in this region    to suggest a loculated abscess.   Contrast evaluation in

## 2019-06-14 NOTE — CONSULTS
[address] [place] [address]  GI Consultation      Patient: Jax Hough  : 1965       Date:  2019    Subjective:       History of Present Illness  Patient is a 48 y.o.  female admitted with Abdominal pain [R10.9]  Abdominal pain [R10.9] who is seen in consult for vomiting. She has PMHx significant for rectal cancer and hypothyroidism who presents to [facility] ED complaining of intractable nausea, vomiting, and abdominal pain x 1 day. She describes the abdominal pain as crampy and is localized diffusely across her abdomen. She has associated nausea and non-bloody non-bilious vomiting which is made worse with PO intake. She also complains of constipation x 1 day and reports that she is unable to pass gas. She denies any recent travel, new foods, new medications or sick contacts.      While in the ED, BP 97/69, HR 73, Afebrile, RR 16. Basic labs showed a slight leukocytosis of 11.9. Hemoglobin stable at 13.1. BMP WNL. Hepatic function panel unremarkable. Lipase slightly elevated at 106. CT scan of abdomen was obtained which showed gastritis as well as presacral fat density with a focal gas collection   noted internally; however, this seems to appear on prior imaging.      Patient was given zofran, fentanyl and phenergan which provided some relief. Past Medical History:   Diagnosis Date    Back pain     Chicken pox     Depression     NO MEDS NOW OKAY    Fibromyalgia     Gastric ulcer, unspecified as acute or chronic, without mention of hemorrhage, perforation, or obstruction     GERD (gastroesophageal reflux disease)     Insomnia     Sleep study :dxed w/interuption onset insomnia    Marijuana use     no further controlled substances while using marijuana.  Mixed hyperlipidemia 10/17/2011    SLIGHT ELEVATION NO MEDS    Mononucleosis     Panic attacks 2013    Pap smear 2010;10/17/11;10/2012    Nml. Dr. Maddox(prior gyn)    Rectal cancer [facility]) 2017    Screening mammogram 2010;2011;2013    Nml.  Sleep apnea     DOESN'T KNOW SETTINGS    Subclinical hypothyroidism 2013    Thyroid nodules:Right 2013    Wheezing 2013      Past Surgical History:   Procedure Laterality Date     SECTION      CHOLECYSTECTOMY N/A 10/13/2015   [facility] DENTAL [facility]ERY      ENDOSCOPY, COLON, DIAGNOSTIC      LEFT COLECTOMY  2017    Otoniel Cisneros MD, rectal cancer     OTHER SURGICAL HISTORY  10/13/15    LAPAROSCOPIC CHOLECYSTECTOMY    PILONIDAL CYST EXCISION      TONSILLECTOMY        Past Endoscopic History Colon a year ago. No recent EGD. Admission Meds  No current facility-administered medications on file prior to encounter. Current Outpatient Medications on File Prior to Encounter   Medication Sig Dispense Refill    gabapentin (NEURONTIN) 300 MG capsule TAKE TWO CAPSULES BY MOUTH NIGHTLY 60 capsule 1    levothyroxine (SYNTHROID) 50 MCG tablet TAKE ONE TABLET BY MOUTH DAILY 30 tablet 11    vitamin D (ERGOCALCIFEROL) 22699 units CAPS capsule TAKE ONE CAPSULE BY MOUTH WEEKLY 4 capsule 11    clonazePAM (KLONOPIN) 0.5 MG tablet TAKE ONE TABLET BY MOUTH TWICE A DAY 60 tablet 1    tiZANidine (ZANAFLEX) 2 MG tablet Take 1 tablet by mouth 3 times daily as needed (muscle spasm) 30 tablet 0       Patient denies ASA, NSAID use. Allergies  Allergies   Allergen Reactions    Lamictal [Lamotrigine] Rash    Morphine      POSSIBLE DELAYED  SWELLING OF THROAT 12 HOURS LATER  USED BENADRYL TO REVERSE    Naproxen      Stomach     Sulfa Antibiotics Swelling     AND RASH      Social   Social History     Tobacco Use    Smoking status: Former Smoker     Packs/day: 1.00     Years: 30.00     Pack years: 30.00     Types: Cigarettes     Last attempt to quit: 2009     Years since quitting: 10.0    Smokeless tobacco: Never Used   Substance Use Topics    Alcohol use:  Yes     Alcohol/week: 0.6 oz     Types: 1 Cans of beer per week     Comment: Occasional        Family History   Problem Relation Age of Onset    High Cholesterol Mother     High Blood Pressure Father     Heart Disease Father     COPD Father     Stroke Maternal Grandfather     Thyroid Disease Sister         Hypothyroidism      No family history of colon cancer, Crohn's disease, or ulcerative colitis. Review of Systems  Pertinent items are noted in HPI. Physical Exam    /65   Pulse 62   Temp 98.7 °F (37.1 °C) (Oral)   Resp 18   LMP 11/14/2017   SpO2 93%   General appearance: alert, cooperative, no distress, appears stated age  Anicteric, No Jaundice  Head: Normocephalic, without obvious abnormality  Lungs: clear to auscultation bilaterally, Normal Effort  Heart: regular rate and rhythm, normal S1 and S2, no murmurs or rubs  Abdomen: soft, non-tender; bowel sounds normal; no masses,  no organomegaly  Extremities: atraumatic, no cyanosis or edema  Skin: warm and dry  Neuro: intact  AAOX3      Data Review:    Recent Labs     06/14/19  0335   WBC 11.9*   HGB 13.1   HCT 40.0   MCV 92.5        Recent Labs     06/14/19  0335      K 3.9      CO2 28   BUN 19   CREATININE 0.7     Recent Labs     06/14/19  0335   AST 16   ALT 9*   BILIDIR <0.2   BILITOT <0.2   ALKPHOS 85     Recent Labs     06/14/19  0335   LIPASE 106.0*     No results for input(s): PROTIME, INR in the last 72 hours. No results for input(s): PTT in the last 72 hours. No results for input(s): OCCULTBLD in the last 72 hours. Imaging Studies:                            Ultrasound:  n/a              CT-scan of abdomen and pelvis: as above                 Assessment:     Active Problems:    Abdominal pain  Resolved Problems:    * No resolved hospital problems. *      Severe nausea and vomiting. Recommendations: Will do an urgent EGD today. Thank you for the opportunity to participate in [address] [place] [address].     Jemima Wong

## 2019-06-14 NOTE — DISCHARGE SUMMARY
Hospital Medicine Discharge Summary    Patient ID: Bailey Crook   Gender: female  : 1965   Age: 48 y.o. MRN: [id number]  Code Status: Full Code    Patient's PCP: Gurjit Cabrera MD    Admit Date: 2019     Discharge Date: 6/15/2019  6/15/19    Admitting Physician: Feliz George MD     Discharge Physician: Violeta Holland MD     Discharge Diagnoses: Active Hospital Problems    Diagnosis Date Noted    Abdominal pain [R10.9] 2019    Intractable vomiting with nausea [R11.2]     Gastritis and duodenitis [K29.90]     Acquired hypothyroidism [E03.9]     Generalized anxiety disorder [F41.1]      Acute:   1. Diffuse gastritis   2. Intractable nausea/vomiting     Chronic  1. History of rectal cancer  2. Hypothyroidism   3. Anxiety   4. Muscle spasms     The patient was seen and examined on day of discharge and this discharge summary is in conjunction with any daily progress note from day of discharge. Hospital Course: Ms. Liz Vasques is a 47 yo female with PMH significant for rectal cancer, hypothyroidism, anxiety and muscle spasms  who presented with intractable nausea, vomiting, and epigastric abdominal pain one day in duration. She also is s/p laparoscopic ultralow anterior resection with colonic J-pouch and diverting loop ileostomy, complicated by anastomotic leak that healed by transanal repair. Underwent reversal of ileostomy. Last colonoscopy showed diverticulosis in descending colon with normal ileum and functional end-to-end coloanal anastomosis in 2019. Please see H&P for further details. CT abdomen and pelvis without contrast was suggestive of distal gastritis, and showed presacral fat density with focal gas collection that had appeared to be present on previous scan on 2019. She was treated with zofran, phenergan, scopolamine patch, IVF and PPI. Started metamucil that was recommended for chronic prevention of constipation previously.  Given the patient's continued nausea and vomiting after anti-emetics with her history and abdominal pain, GI was consulted. Patient underwent urgent EGD that showed a normal esophagus, non-erosive and diffuse gastritis with biopsies taken. She tolerated advancement of her diet without resolution of nausea/vomiting and abdominal pain. She was given a brief course of anti-emetics, PPI BID for one month duration and metamucil as previously recommended by colorectal surgery for discharge. Her chronic medical conditions to continued to be managed during her inpatient stay. Disposition:  Home    Physical Exam Performed:     BP 95/67   Pulse 66   Temp 98.2 °F (36.8 °C) (Oral)   Resp 16   LMP 11/14/2017   SpO2 99%     General appearance:  No apparent distress, appears stated age and cooperative. HEENT:  Normal cephalic, atraumatic without obvious deformity. Conjunctivae/corneas clear. Neck: Supple, with full range of motion. No jugular venous distention. Trachea midline. Respiratory:  Normal respiratory effort. Clear to auscultation, bilaterally without Rales/Wheezes/Rhonchi. Cardiovascular:  Regular rate and rhythm with normal S1/S2 without murmurs, rubs or gallops. Abdomen: Soft, non-tender, non-distended with normal bowel sounds. Musculoskeletal:  No clubbing, cyanosis or edema bilaterally. Full range of motion without deformity. Skin: Skin warm and dry. No rashes or lesions. Neurologic:  Neurovascularly intact without any focal sensory/motor deficits. Psychiatric:  Alert and oriented, thought content appropriate, normal insight  Capillary Refill: Brisk,< 3 seconds   Peripheral Pulses: +2 palpable, equal bilaterally       Labs:  For convenience and continuity at follow-up the following most recent labs are provided:      CBC:    Lab Results   Component Value Date    WBC 14.3 06/15/2019    HGB 12.4 06/15/2019    HCT 38.6 06/15/2019     06/15/2019       Renal:    Lab Results   Component Value Date     06/15/2019    K traversed. The terminal ileum appeared normal.    (((Impression: - Diverticulosis in the descending colon. - Patent functional end-to-end colo-anal anastomosis,   characterized by healthy appearing mucosa. - The examined portion of the ileum was normal.  - No specimens collected. Recommendation: - Patient has a contact number available for   emergencies. The signs and symptoms of potential delayed   complications were discussed with the patient. Return to   normal activities tomorrow. Written discharge   instructions were provided to the patient.  - Resume previous diet. - Continue present medications.  - Repeat colonoscopy in 1 year for surveillance. \")))     EGD with biopsy for histology 6/14/19:   \"Details of Procedure  The upper endoscope was inserted atraumatically into the esophagus and advanced under direct vision to the second  portion of the duodenum. The scope was slowly withdrawn carefully inspecting the duodenal bulb, entire stomach in  both forward and retroflexed views and esophagus. No abnormalities were found in the esophagus. No erosions, ulcerations, columnar lined epithelia, stricture, rings or  masses were noted. Nonerosive gastritis was noted in the diffusely in stomach. Cold biopsy was performed. Duodenum was normal.  Post Op Diagnosis  - Normal esophagus as above without signs of esophagitis, columnar lined epithelia, or mass  - Nonerosive gastritis, diffusely in stomach. Biopsy performed. Recommendations  - Resume diet. - Follow biopsy results. - PPI  - Symptomatic Rx. \"    Consults:     IP CONSULT TO PRIMARY CARE PROVIDER  IP CONSULT TO GI    Disposition:  home     Condition at Discharge: Stable    Discharge Instructions/Follow-up:  Please follow up outpatient after discharge.     Code Status:  Full Code     Activity: activity as tolerated    Diet: regular diet      Discharge Medications:     Discharge Medication List as of 6/15/2019 11:31 AM           Details   pantoprazole (PROTONIX) 20 MG tablet Take 2 tablets by mouth 2 times daily, Disp-120 tablet, R-0Print      promethazine (PHENERGAN) 12.5 MG tablet Take 1 tablet by mouth 3 times daily as needed for Nausea, Disp-15 tablet, R-0Print      psyllium (METAMUCIL SMOOTH TEXTURE) 28 % packet Take 1 packet by mouth daily Take with full glass of h20 or juice, Disp-30 packet, R-0Print              Details   gabapentin (NEURONTIN) 300 MG capsule TAKE TWO CAPSULES BY MOUTH NIGHTLY, Disp-60 capsule, R-1Phone In      levothyroxine (SYNTHROID) 50 MCG tablet TAKE ONE TABLET BY MOUTH DAILY, Disp-30 tablet, R-11Normal      vitamin D (ERGOCALCIFEROL) 73293 units CAPS capsule TAKE ONE CAPSULE BY MOUTH WEEKLY, Disp-4 capsule, R-11Normal      clonazePAM (KLONOPIN) 0.5 MG tablet TAKE ONE TABLET BY MOUTH TWICE A DAY, Disp-60 tablet, R-1Print      tiZANidine (ZANAFLEX) 2 MG tablet Take 1 tablet by mouth 3 times daily as needed (muscle spasm), Disp-30 tablet, R-0Print             Time Spent on discharge is more than 30 minutes in the examination, evaluation, counseling and review of medications and discharge plan. Signed:    Ji Brown MD   6/15/2019    Addendum to Resident H& P/Progress note:  I have personally seen,examined and evaluated the patient.  I have reviewed the current history, physical findings, labs and assessment and plan and agree with note as documented by resident MD [facility])      Adelaida Edwards MD, Katarina Cheng

## 2019-06-14 NOTE — ED PROVIDER NOTES
[address] [facility]          ATTENDING PHYSICIAN NOTE       Date of evaluation: 6/14/2019    Chief Complaint     Emesis; Nausea; and Abdominal Pain      History of Present Illness     Quoc Javier is a 48 y.o. female who presents to the emergency department complaining of nausea, vomiting, and abdominal pain. Patient states her symptoms started approximately 24 hours prior to presentation. She states throughout the day she had crampy abdominal pain and had decreased p.o. intake. She states her last bowel movement was before the pain began and she has not been passing gas since then. She states she has had 2 episodes of vomiting that have been nonbloody nonbilious. She denies any fevers or chills. She denies any urinary symptoms. She denies having symptoms like this in the past.  She denies any recent unusual foods or sick contacts. She does have a history of rectal cancer status post resection and reanastomosis approximately 1 year ago. She tried taking Tums and Pepto-Bismol at home without improvement of her symptoms. Review of Systems     Review of Systems   Constitutional: Negative. HENT: Negative. Eyes: Negative. Respiratory: Negative. Cardiovascular: Negative. Gastrointestinal: Positive for abdominal pain, nausea and vomiting. Genitourinary: Negative. Musculoskeletal: Negative. Neurological: Negative. All other systems reviewed and are negative. Past Medical, Surgical, Family, and Social History     She has a past medical history of Back pain, Chicken pox, Depression, Fibromyalgia, Gastric ulcer, unspecified as acute or chronic, without mention of hemorrhage, perforation, or obstruction, GERD (gastroesophageal reflux disease), Insomnia, Marijuana use, Mixed hyperlipidemia, Mononucleosis, Panic attacks, Pap smear, Rectal cancer ([address] Screening mammogram, Sleep apnea, Subclinical hypothyroidism, Thyroid nodules:Right, and Wheezing.   She has a past surgical history that includes  section (); Tonsillectomy; Pilonidal cyst excision; Dental surgery; Endoscopy, colon, diagnostic; other surgical history (10/13/15); Cholecystectomy (N/A, 10/13/2015); and left colectomy (2017). Her family history includes COPD in her father; Heart Disease in her father; High Blood Pressure in her father; High Cholesterol in her mother; Stroke in her maternal grandfather; Thyroid Disease in her sister. She reports that she quit smoking about 10 years ago. Her smoking use included cigarettes. She has a 30.00 pack-year smoking history. She has never used smokeless tobacco. She reports that she drinks about 0.6 oz of alcohol per week. She reports that she does not use drugs. Medications     Previous Medications    CLONAZEPAM (KLONOPIN) 0.5 MG TABLET    TAKE ONE TABLET BY MOUTH TWICE A DAY    GABAPENTIN (NEURONTIN) 300 MG CAPSULE    TAKE TWO CAPSULES BY MOUTH NIGHTLY    LEVOTHYROXINE (SYNTHROID) 50 MCG TABLET    TAKE ONE TABLET BY MOUTH DAILY    TIZANIDINE (ZANAFLEX) 2 MG TABLET    Take 1 tablet by mouth 3 times daily as needed (muscle spasm)    VITAMIN D (ERGOCALCIFEROL) 94397 UNITS CAPS CAPSULE    TAKE ONE CAPSULE BY MOUTH WEEKLY       Allergies     She is allergic to lamictal [lamotrigine]; morphine; naproxen; and sulfa antibiotics. Physical Exam     INITIAL VITALS: BP: 97/69, Temp: 98.3 °F (36.8 °C), Pulse: 73, Resp: 16, SpO2: 99 %    Physical Exam   Constitutional: She is oriented to person, place, and time. She appears well-developed and well-nourished. No distress. HENT:   Head: Normocephalic and atraumatic. Mouth/Throat: Oropharynx is clear and moist. No oropharyngeal exudate. Eyes: Pupils are equal, round, and reactive to light. Conjunctivae and EOM are normal. No scleral icterus. Neck: Normal range of motion. Neck supple. No JVD present. Cardiovascular: Normal rate, regular rhythm and normal heart sounds.  Exam reveals no gallop and no friction rub. No murmur heard. Pulmonary/Chest: Effort normal and breath sounds normal. She has no wheezes. She has no rales. Abdominal: Soft. Bowel sounds are normal. There is generalized tenderness. There is no rebound and no guarding. Musculoskeletal: Normal range of motion. She exhibits no edema. Lymphadenopathy:     She has no cervical adenopathy. Neurological: She is alert and oriented to person, place, and time. She displays normal reflexes. She exhibits normal muscle tone. Skin: Skin is warm and dry. No erythema. Nursing note and vitals reviewed. Diagnostic Results     RADIOLOGY:  CT ABDOMEN PELVIS W IV CONTRAST Additional Contrast? None   Final Result   1. There is asymmetric mucosal thickening of the distal body and antrum    of the stomach. This may be due to underdistention. Distal gastritis is    also a consideration. No high-grade gastric obstruction with a stomach    predominantly decompressed. No evidence for perforation. 2.  There is increased presacral fat density with a focal gas collection    noted internally. The gas collection does not definitively communicate    with the adjacent sigmoid colon. This may represent a subtle atypical    diverticulum or fistulous communication with the adjacent postoperative    sigmoid colon. No definite fluid collection is identified in this region    to suggest a loculated abscess. Contrast evaluation in this region may    provide greater detail evaluation for potential communication with the    distal small bowel, as clinically appropriate if no prior imaging is    available to ensure stability over time. Kevin Aguilera           LABS:   Results for orders placed or performed during the hospital encounter of 06/14/19   CBC auto differential   Result Value Ref Range    WBC 11.9 (H) 4.0 - 11.0 K/uL    RBC 4.33 4.00 - 5.20 M/uL    Hemoglobin 13.1 12.0 - 16.0 g/dL    Hematocrit 40.0 36.0 - 48.0 %    MCV 92.5 80.0 - 100.0 fL    MCH 30.4 26.0 - 34.0 pg    MCHC 32.8 31.0 - 36.0 g/dL    RDW 14.0 12.4 - 15.4 %    Platelets 360 928 - 763 K/uL    MPV 9.0 5.0 - 10.5 fL    Neutrophils % 77.9 %    Lymphocytes % 18.1 %    Monocytes % 3.4 %    Eosinophils % 0.3 %    Basophils % 0.3 %    Neutrophils # 9.3 (H) 1.7 - 7.7 K/uL    Lymphocytes # 2.2 1.0 - 5.1 K/uL    Monocytes # 0.4 0.0 - 1.3 K/uL    Eosinophils # 0.0 0.0 - 0.6 K/uL    Basophils # 0.0 0.0 - 0.2 K/uL   Basic Metabolic Panel (EP - 1)   Result Value Ref Range    Sodium 141 136 - 145 mmol/L    Potassium 3.9 3.5 - 5.1 mmol/L    Chloride 100 99 - 110 mmol/L    CO2 28 21 - 32 mmol/L    Anion Gap 13 3 - 16    Glucose 133 (H) 70 - 99 mg/dL    BUN 19 7 - 20 mg/dL    CREATININE 0.7 0.6 - 1.1 mg/dL    GFR Non-African American >60 >60    GFR African American >60 >60    Calcium 10.0 8.3 - 10.6 mg/dL   Hepatic function panel (LFTs)   Result Value Ref Range    Total Protein 7.1 6.4 - 8.2 g/dL    Alb 4.3 3.4 - 5.0 g/dL    Alkaline Phosphatase 85 40 - 129 U/L    ALT 9 (L) 10 - 40 U/L    AST 16 15 - 37 U/L    Total Bilirubin <0.2 0.0 - 1.0 mg/dL    Bilirubin, Direct <0.2 0.0 - 0.3 mg/dL    Bilirubin, Indirect see below 0.0 - 1.0 mg/dL   Lipase   Result Value Ref Range    Lipase 106.0 (H) 13.0 - 60.0 U/L   POCT Venous   Result Value Ref Range    pH, Andrew 7.511 (H) 7.350 - 7.450    pCO2, Andrew 34.5 (L) 40.0 - 50.0 mm Hg    pO2, Andrew 64 Not Established mm Hg    HCO3, Venous 27.6 23.0 - 29.0 mmol/L    Base Excess, Andrew 5 (H) -3 - 3    O2 Sat, Andrew 94 Not Established %    TC02 (Calc), Andrew 29 Not Established mmol/L    Lactate 2.21 (H) 0.40 - 2.00 mmol/L    Sample Type ANDREW     Performed on SEE BELOW      RECENT VITALS:  BP: 97/69,Temp: 98.3 °F (36.8 °C), Pulse: 73, Resp: 16, SpO2: 99 %     Procedures     N/A    ED Course     Nursing Notes, Past Medical Hx, Past Surgical Hx, Social Hx,Allergies, and Family Hx were reviewed.     The patient was given the following medications:  Orders Placed This Encounter   Medications    [facility]ron [facility]) injection 4 mg    lactated ringers bolus    fentaNYL (SUBLIMAZE) injection 50 mcg    iopamidol (ISOVUE-370) 76 % injection 80 mL    droperidol (INAPSINE) injection 1.25 mg    0.9 % sodium chloride bolus    promethazine (PHENERGAN) injection 12.5 mg       CONSULTS:  Bridget Wright DECISIONMAKING / ASSESSMENT / Criss Neno is a 48 y.o. female presents to the emergency department complaining of abdominal pain, nausea, and vomiting. Patient notes obstipation as well. Patient has a soft abdomen with tenderness throughout, predominantly in the upper abdomen. Laboratory studies are significant for a mildly elevated white blood cell count of 11.9. Lipase is slightly elevated at 103. CT scan of the abdomen and pelvis was obtained that does show evidence of gastritis but no evidence of obstruction. There is also comment on a focus of gas in the presacral area but in review of records from [facility] she had this on prior imaging from where she had a drainage catheter for postoperative infection, so I feel it is unlikely related to her symptoms today. Patient was given multiple antiemetics and pain medications with out improvement of symptoms. Patient will be admitted through her primary care provider for repeat evaluation and symptom management. Clinical Impression     1. Gastritis and duodenitis        Disposition     PATIENT REFERRED TO:  No follow-up provider specified.     DISCHARGE MEDICATIONS:  New Prescriptions    No medications on file       DISPOSITION Decision To Admit 06/14/2019 05:46:50 AM        Peggy Sotelo MD  06/14/19 9438

## 2019-06-14 NOTE — ED NOTES
Mint Green, Lavender, Blue, Yellow/Orange blood tubes collected and sent to lab.         Thomas Johnston RN  06/14/19 7713

## 2019-06-14 NOTE — ANESTHESIA PRE PROCEDURE
 enoxaparin (LOVENOX) injection 40 mg  40 mg Subcutaneous Daily Bridger Mendoza MD   40 mg at 06/14/19 0837    0.9 % sodium chloride infusion   Intravenous Continuous Bridger Mendoza  mL/hr at 06/14/19 0721      senna (SENOKOT) 8.8 MG/5ML syrup 8.8 mg  5 mL Oral BID PRN Bridger Mendoza MD        acetaminophen (TYLENOL) tablet 650 mg  650 mg Oral Q4H PRN Bridger Mendoza MD        pantoprazole (PROTONIX) injection 40 mg  40 mg Intravenous Daily Bridger Mendoza MD   40 mg at 06/14/19 3242    dicyclomine (BENTYL) capsule 10 mg  10 mg Oral TID Bridger Mendoza MD   10 mg at 06/14/19 0837    promethazine (PHENERGAN) injection 12.5 mg  12.5 mg Intravenous Q4H Bridger Mendoza MD   12.5 mg at 06/14/19 1249    scopolamine (TRANSDERM-SCOP) transdermal patch 1 patch  1 patch Transdermal Q72H Pascual Viveros MD   1 patch at 06/14/19 1112    psyllium (HYDROCIL) 95 % packet 1 packet  1 packet Oral Daily Pascual Viveros MD        HYDROmorphone (DILAUDID) injection 0.25 mg  0.25 mg Intravenous Q5 Min PRN Jie Hampton MD        HYDROmorphone (DILAUDID) injection 0.5 mg  0.5 mg Intravenous Q5 Min PRN Jie Hampton MD        HYDROmorphone (DILAUDID) injection 0.5 mg  0.5 mg Intravenous Q5 Min PRN Jie Hampton MD        oxyCODONE (ROXICODONE) immediate release tablet 5 mg  5 mg Oral PRN Jie Hampton MD        Or    oxyCODONE (ROXICODONE) immediate release tablet 10 mg  10 mg Oral PRN Jie Hampton MD        diphenhydrAMINE (BENADRYL) injection 12.5 mg  12.5 mg Intravenous Once PRN Jie Hampton MD        metoclopramide (REGLAN) injection 10 mg  10 mg Intravenous Once PRN Jie Hampton MD        promethazine (PHENERGAN) injection 6.25 mg  6.25 mg Intravenous Once PRN Jie Hampton MD        labetalol (NORMODYNE;TRANDATE) injection 5 mg  5 mg Intravenous Q10 Min PRN Jie Hampton MD        hydrALAZINE (APRESOLINE) injection 5 mg  5 mg Intravenous Q10 Min PRN     GERD (gastroesophageal reflux disease)     Insomnia 1992    Sleep study 1992:dxed w/interuption onset insomnia    Marijuana use     no further controlled substances while using marijuana.  Mixed hyperlipidemia 10/17/2011    SLIGHT ELEVATION NO MEDS    Mononucleosis     Panic attacks     Pap smear 2010;10/17/11;10/2012    Nml. Dr. Maddox(prior gyn)    Rectal cancer [facility]) 2017    Screening mammogram 2010;2011;2013    Nml.  Sleep apnea     DOESN'T KNOW SETTINGS    Subclinical hypothyroidism 2013    Thyroid nodules:Right 2013    Wheezing 2013       Past Surgical History:        Procedure Laterality Date     SECTION      CHOLECYSTECTOMY N/A 10/13/2015   [facility]      ENDOSCOPY, COLON, DIAGNOSTIC      LEFT COLECTOMY  2017    Zakia Olson MD, rectal cancer     OTHER SURGICAL HISTORY  10/13/15    LAPAROSCOPIC CHOLECYSTECTOMY    PILONIDAL CYST EXCISION      TONSILLECTOMY         Social History:    Social History     Tobacco Use    Smoking status: Former Smoker     Packs/day: 1.00     Years: 30.00     Pack years: 30.00     Types: Cigarettes     Last attempt to quit: 2009     Years since quitting: 10.0    Smokeless tobacco: Never Used   Substance Use Topics    Alcohol use:  Yes     Alcohol/week: 0.6 oz     Types: 1 Cans of beer per week     Comment: Occasional                                Counseling given: Not Answered      Vital Signs (Current):   Vitals:    19 0306 19 0530 19 0652   BP: 97/69 (!) 143/94 129/65   Pulse: 73  62   Resp: 16  18   Temp: 98.3 °F (36.8 °C)  98.7 °F (37.1 °C)   TempSrc: Oral  Oral   SpO2: 99% 100% 93%                                              BP Readings from Last 3 Encounters:   19 129/65   19 111/71   19 120/70       NPO Status:                                                                                 BMI:   Wt Readings from Last 3 Encounters: 05/14/19 142 lb 3.2 oz (64.5 kg)   03/22/19 144 lb (65.3 kg)   12/14/18 143 lb (64.9 kg)     There is no height or weight on file to calculate BMI.    CBC:   Lab Results   Component Value Date    WBC 11.9 06/14/2019    RBC 4.33 06/14/2019    RBC 4.21 10/11/2016    HGB 13.1 06/14/2019    HCT 40.0 06/14/2019    MCV 92.5 06/14/2019    RDW 14.0 06/14/2019     06/14/2019       CMP:   Lab Results   Component Value Date     06/14/2019    K 3.9 06/14/2019     06/14/2019    CO2 28 06/14/2019    BUN 19 06/14/2019    CREATININE 0.7 06/14/2019    GFRAA >60 06/14/2019    GFRAA >60 02/07/2013    AGRATIO 1.6 09/24/2016    LABGLOM >60 06/14/2019    GLUCOSE 133 06/14/2019    PROT 7.1 06/14/2019    PROT 6.7 02/07/2013    CALCIUM 10.0 06/14/2019    BILITOT <0.2 06/14/2019    ALKPHOS 85 06/14/2019    AST 16 06/14/2019    ALT 9 06/14/2019       POC Tests: No results for input(s): POCGLU, POCNA, POCK, POCCL, POCBUN, POCHEMO, POCHCT in the last 72 hours. Coags: No results found for: PROTIME, INR, APTT    HCG (If Applicable):   Lab Results   Component Value Date    PREGTESTUR Negative 06/14/2019        ABGs: No results found for: PHART, PO2ART, CSD8PSB, MPR9AUO, BEART, D9PPQWDX     Type & Screen (If Applicable):  No results found for: LABABO, [address]    Anesthesia Evaluation  Patient summary reviewed and Nursing notes reviewed no history of anesthetic complications:   Airway: Mallampati: II  TM distance: >3 FB   Neck ROM: full  Mouth opening: > = 3 FB Dental:          Pulmonary:   (+) sleep apnea:                             Cardiovascular:Negative CV ROS                      Neuro/Psych:   (+) neuromuscular disease:, psychiatric history:            GI/Hepatic/Renal:   (+) GERD:, PUD,           Endo/Other:    (+) hypothyroidism::., .                 Abdominal:           Vascular:                                        Anesthesia Plan      general     ASA 1    (82-year-old female presents for EGD.   Plan general anesthesia with ASA standard monitors. Questions answered. Patient agreeable with anesthetic plan.  )  Induction: intravenous. Anesthetic plan and risks discussed with patient. Plan discussed with CRNA.     Attending anesthesiologist reviewed and agrees with Pre Eval content        Whitley Srinivasan MD   6/14/2019

## 2019-06-15 VITALS
RESPIRATION RATE: 16 BRPM | DIASTOLIC BLOOD PRESSURE: 67 MMHG | HEART RATE: 66 BPM | OXYGEN SATURATION: 99 % | TEMPERATURE: 98.2 F | SYSTOLIC BLOOD PRESSURE: 95 MMHG

## 2019-06-15 LAB
ANION GAP SERPL CALCULATED.3IONS-SCNC: 10 MMOL/L (ref 3–16)
BASOPHILS ABSOLUTE: 0.1 K/UL (ref 0–0.2)
BASOPHILS RELATIVE PERCENT: 0.8 %
BUN BLDV-MCNC: 16 MG/DL (ref 7–20)
CALCIUM SERPL-MCNC: 8.8 MG/DL (ref 8.3–10.6)
CHLORIDE BLD-SCNC: 108 MMOL/L (ref 99–110)
CO2: 25 MMOL/L (ref 21–32)
CREAT SERPL-MCNC: 0.6 MG/DL (ref 0.6–1.1)
EOSINOPHILS ABSOLUTE: 0.1 K/UL (ref 0–0.6)
EOSINOPHILS RELATIVE PERCENT: 0.4 %
GFR AFRICAN AMERICAN: >60
GFR NON-AFRICAN AMERICAN: >60
GLUCOSE BLD-MCNC: 102 MG/DL (ref 70–99)
HCT VFR BLD CALC: 38.6 % (ref 36–48)
HEMOGLOBIN: 12.4 G/DL (ref 12–16)
LYMPHOCYTES ABSOLUTE: 4.6 K/UL (ref 1–5.1)
LYMPHOCYTES RELATIVE PERCENT: 32.5 %
MAGNESIUM: 1.7 MG/DL (ref 1.8–2.4)
MCH RBC QN AUTO: 29.9 PG (ref 26–34)
MCHC RBC AUTO-ENTMCNC: 32.2 G/DL (ref 31–36)
MCV RBC AUTO: 92.8 FL (ref 80–100)
MONOCYTES ABSOLUTE: 1 K/UL (ref 0–1.3)
MONOCYTES RELATIVE PERCENT: 7.2 %
NEUTROPHILS ABSOLUTE: 8.4 K/UL (ref 1.7–7.7)
NEUTROPHILS RELATIVE PERCENT: 59.1 %
PDW BLD-RTO: 14.2 % (ref 12.4–15.4)
PLATELET # BLD: 233 K/UL (ref 135–450)
PMV BLD AUTO: 9.8 FL (ref 5–10.5)
POTASSIUM REFLEX MAGNESIUM: 3.5 MMOL/L (ref 3.5–5.1)
RBC # BLD: 4.15 M/UL (ref 4–5.2)
SODIUM BLD-SCNC: 143 MMOL/L (ref 136–145)
WBC # BLD: 14.3 K/UL (ref 4–11)

## 2019-06-15 PROCEDURE — 83735 ASSAY OF MAGNESIUM: CPT

## 2019-06-15 PROCEDURE — 80048 BASIC METABOLIC PNL TOTAL CA: CPT

## 2019-06-15 PROCEDURE — 6360000002 HC RX W HCPCS: Performed by: INTERNAL MEDICINE

## 2019-06-15 PROCEDURE — C9113 INJ PANTOPRAZOLE SODIUM, VIA: HCPCS | Performed by: INTERNAL MEDICINE

## 2019-06-15 PROCEDURE — 6360000002 HC RX W HCPCS: Performed by: STUDENT IN AN ORGANIZED HEALTH CARE EDUCATION/TRAINING PROGRAM

## 2019-06-15 PROCEDURE — 99238 HOSP IP/OBS DSCHRG MGMT 30/<: CPT | Performed by: HOSPITALIST

## 2019-06-15 PROCEDURE — 36415 COLL VENOUS BLD VENIPUNCTURE: CPT

## 2019-06-15 PROCEDURE — 2580000003 HC RX 258: Performed by: STUDENT IN AN ORGANIZED HEALTH CARE EDUCATION/TRAINING PROGRAM

## 2019-06-15 PROCEDURE — 85025 COMPLETE CBC W/AUTO DIFF WBC: CPT

## 2019-06-15 PROCEDURE — 6370000000 HC RX 637 (ALT 250 FOR IP): Performed by: STUDENT IN AN ORGANIZED HEALTH CARE EDUCATION/TRAINING PROGRAM

## 2019-06-15 RX ORDER — PROMETHAZINE HYDROCHLORIDE 12.5 MG/1
12.5 TABLET ORAL 3 TIMES DAILY PRN
Qty: 15 TABLET | Refills: 0 | Status: SHIPPED | OUTPATIENT
Start: 2019-06-15 | End: 2021-04-14

## 2019-06-15 RX ORDER — MAGNESIUM SULFATE IN WATER 40 MG/ML
2 INJECTION, SOLUTION INTRAVENOUS ONCE
Status: COMPLETED | OUTPATIENT
Start: 2019-06-15 | End: 2019-06-15

## 2019-06-15 RX ORDER — PANTOPRAZOLE SODIUM 20 MG/1
40 TABLET, DELAYED RELEASE ORAL 2 TIMES DAILY
Qty: 120 TABLET | Refills: 0 | Status: SHIPPED | OUTPATIENT
Start: 2019-06-15 | End: 2020-07-07 | Stop reason: ALTCHOICE

## 2019-06-15 RX ADMIN — ENOXAPARIN SODIUM 40 MG: 40 INJECTION SUBCUTANEOUS at 08:44

## 2019-06-15 RX ADMIN — PROMETHAZINE HYDROCHLORIDE 12.5 MG: 25 INJECTION INTRAMUSCULAR; INTRAVENOUS at 03:47

## 2019-06-15 RX ADMIN — Medication 1 PACKET: at 08:44

## 2019-06-15 RX ADMIN — Medication 10 ML: at 08:44

## 2019-06-15 RX ADMIN — MAGNESIUM SULFATE HEPTAHYDRATE 2 G: 40 INJECTION, SOLUTION INTRAVENOUS at 09:00

## 2019-06-15 RX ADMIN — LEVOTHYROXINE SODIUM 50 MCG: 50 TABLET ORAL at 08:45

## 2019-06-15 RX ADMIN — SODIUM CHLORIDE: 9 INJECTION, SOLUTION INTRAVENOUS at 03:48

## 2019-06-15 RX ADMIN — PANTOPRAZOLE SODIUM 40 MG: 40 INJECTION, POWDER, FOR SOLUTION INTRAVENOUS at 08:43

## 2019-06-15 RX ADMIN — ACETAMINOPHEN 650 MG: 325 TABLET ORAL at 09:10

## 2019-06-15 ASSESSMENT — PAIN SCALES - GENERAL
PAINLEVEL_OUTOF10: 3
PAINLEVEL_OUTOF10: 0

## 2019-06-15 NOTE — PROGRESS NOTES
Pt d/c to home with via private vehicle. AVS reviewed and signed by pt. Pt stated will follow-up with Dr Jayde Gupta with [facility] For biopsy results. All personal belongings taken from room by pt. Pt escorted by wheelchair to main entrance and assisted into vehicle. Tolerated well.

## 2019-06-15 NOTE — PROGRESS NOTES
Pt did not have any nausea/vomiting overnight and slept well. Requesting clear liquid options to try this morning. Will continue to monitor.

## 2019-06-17 ENCOUNTER — TELEPHONE (OUTPATIENT)
Dept: INTERNAL MEDICINE CLINIC | Age: 54
End: 2019-06-17

## 2019-06-17 NOTE — TELEPHONE ENCOUNTER
[address] [facility] Initial Follow Up Call    Outreach made within 2 business days of discharge: Yes    Patient: Adore Leal Patient : 1965   MRN: [id number]  Reason for Admission: There are no discharge diagnoses documented for the most recent discharge. Discharge Date: 6/15/19       Spoke with: Yanely Moran    Discharge department/facility: [facility]    TCM Interactive Patient Contact:  Was patient able to fill all prescriptions: Yes  Was patient instructed to bring all medications to the follow-up visit: Yes  Is patient taking all medications as directed in the discharge summary?  Yes  Does patient understand their discharge instructions: Yes  Does patient have questions or concerns that need addressed prior to 7-14 day follow up office visit: no    Scheduled appointment with PCP within 7-14 days    Follow Up  Future Appointments   Date Time Provider Aidee Yen   2019  1:00 PM Carmen Estrada MD [address] [facility]   2019  9:10 AM Enedelia Hansen MD [id number] [place], [place]

## 2019-06-18 ENCOUNTER — OFFICE VISIT (OUTPATIENT)
Dept: INTERNAL MEDICINE CLINIC | Age: 54
End: 2019-06-18
Payer: COMMERCIAL

## 2019-06-18 VITALS
SYSTOLIC BLOOD PRESSURE: 130 MMHG | DIASTOLIC BLOOD PRESSURE: 60 MMHG | HEART RATE: 68 BPM | WEIGHT: 146 LBS | HEIGHT: 61 IN | OXYGEN SATURATION: 98 % | BODY MASS INDEX: 27.56 KG/M2 | RESPIRATION RATE: 14 BRPM

## 2019-06-18 DIAGNOSIS — K29.00 ACUTE SUPERFICIAL GASTRITIS WITHOUT HEMORRHAGE: Primary | ICD-10-CM

## 2019-06-18 PROCEDURE — 1111F DSCHRG MED/CURRENT MED MERGE: CPT | Performed by: INTERNAL MEDICINE

## 2019-06-18 PROCEDURE — 99215 OFFICE O/P EST HI 40 MIN: CPT | Performed by: INTERNAL MEDICINE

## 2019-06-18 ASSESSMENT — PATIENT HEALTH QUESTIONNAIRE - PHQ9
2. FEELING DOWN, DEPRESSED OR HOPELESS: 0
SUM OF ALL RESPONSES TO PHQ9 QUESTIONS 1 & 2: 0
SUM OF ALL RESPONSES TO PHQ QUESTIONS 1-9: 0
SUM OF ALL RESPONSES TO PHQ QUESTIONS 1-9: 0
1. LITTLE INTEREST OR PLEASURE IN DOING THINGS: 0

## 2019-06-18 ASSESSMENT — ENCOUNTER SYMPTOMS
COUGH: 0
BACK PAIN: 0
SHORTNESS OF BREATH: 0
CHEST TIGHTNESS: 0
ABDOMINAL PAIN: 0
NAUSEA: 0
EYE REDNESS: 0

## 2019-06-18 NOTE — PROGRESS NOTES
Post-Discharge Transitional Care Management Services or Hospital Follow Up      Pat Skinner   YOB: 1965    Date of Office Visit:  6/18/2019  Date of Hospital Admission: 6/14/19  Date of Hospital Discharge: 6/15/19  Readmission Risk Score(high >=14%.  Medium >=10%):Readmission Risk Score: 10      Care management risk score Rising risk (score 2-5) and Complex Care (Scores >=6): 4     Non face to face  following discharge, date last encounter closed (first attempt may have been earlier): 6/17/2019 10:51 AM 6/17/2019 10:51 AM    Call initiated 2 business days of discharge: Yes     Patient Active Problem List   Diagnosis    Depression    Fibromyalgia    Mixed hyperlipidemia    Contusion, ankle/foot:left    Left ankle injury    Injury of left foot    Contusion:left foot/ankle    ContusionLeft foot/ankle    Ankle joint effusion:Left    GERD (gastroesophageal reflux disease)    Fatigue    Acute sinusitis    Heat intolerance    Low T4    Abnormal thyroid blood test    Subclinical hypothyroidism    Elevated TSH    Thyroid nodules:Right    Hashimoto's disease    Multiple thyroid nodules    Allergic dermatitis due ingested medication:lamictal    Medication reaction    Anxiety    Vitamin D deficiency    Sleep disorder    Calculus of gallbladder without cholecystitis without obstruction    Tendinitis of right elbow    Right anterior shoulder pain    Ulnar nerve compression    Leukocytosis    Hyperglycemia    Person consulting for explanation of examination or test finding    Viral URI    Obstructive sleep apnea    Hypothyroidism due to acquired atrophy of thyroid    Abdominal pain    Intractable vomiting with nausea    Gastritis and duodenitis    Acquired hypothyroidism    Generalized anxiety disorder       Allergies   Allergen Reactions    Lamictal [Lamotrigine] Rash    Morphine      POSSIBLE DELAYED  SWELLING OF THROAT 12 HOURS LATER  USED BENADRYL TO REVERSE    Naproxen      Stomach     Sulfa Antibiotics Swelling     AND RASH       Medications listed as ordered at the time of discharge from hospital   Rajani KAPOOR   [facility] Medication Instructions CAN:    Printed on:06/18/19 2101   Medication Information                      clonazePAM (KLONOPIN) 0.5 MG tablet  TAKE ONE TABLET BY MOUTH TWICE A DAY             gabapentin (NEURONTIN) 300 MG capsule  TAKE TWO CAPSULES BY MOUTH NIGHTLY             levothyroxine (SYNTHROID) 50 MCG tablet  TAKE ONE TABLET BY MOUTH DAILY             pantoprazole (PROTONIX) 20 MG tablet  Take 2 tablets by mouth 2 times daily             promethazine (PHENERGAN) 12.5 MG tablet  Take 1 tablet by mouth 3 times daily as needed for Nausea             psyllium (METAMUCIL SMOOTH TEXTURE) 28 % packet  Take 1 packet by mouth daily Take with full glass of h20 or juice             tiZANidine (ZANAFLEX) 2 MG tablet  Take 1 tablet by mouth 3 times daily as needed (muscle spasm)             vitamin D (ERGOCALCIFEROL) 61912 units CAPS capsule  TAKE ONE CAPSULE BY MOUTH WEEKLY                   Medications marked \"taking\" at this time  Outpatient Medications Marked as Taking for the 6/18/19 encounter (Office Visit) with Jerome Douglas MD   Medication Sig Dispense Refill    pantoprazole (PROTONIX) 20 MG tablet Take 2 tablets by mouth 2 times daily 120 tablet 0    promethazine (PHENERGAN) 12.5 MG tablet Take 1 tablet by mouth 3 times daily as needed for Nausea 15 tablet 0    psyllium (METAMUCIL SMOOTH TEXTURE) 28 % packet Take 1 packet by mouth daily Take with full glass of h20 or juice 30 packet 0    gabapentin (NEURONTIN) 300 MG capsule TAKE TWO CAPSULES BY MOUTH NIGHTLY 60 capsule 1    levothyroxine (SYNTHROID) 50 MCG tablet TAKE ONE TABLET BY MOUTH DAILY 30 tablet 11    vitamin D (ERGOCALCIFEROL) 38279 units CAPS capsule TAKE ONE CAPSULE BY MOUTH WEEKLY 4 capsule 11    clonazePAM (KLONOPIN) 0.5 MG tablet TAKE ONE TABLET BY MOUTH TWICE A DAY 60 tablet 1    tiZANidine (ZANAFLEX) 2 MG tablet Take 1 tablet by mouth 3 times daily as needed (muscle spasm) 30 tablet 0        Medications patient taking as of now reconciled against medications ordered at time of hospital discharge: Yes    Chief Complaint   Patient presents with   [address] [place] [facility] [place]15[place]        HPI    Inpatient course: Discharge summary reviewed- see chart. Interval history/Current status: She has been eating some regular food in increasing amounts, gaining strength, feeling today 90% better. Review of Systems   Constitutional: Positive for fatigue. Negative for fever and unexpected weight change. HENT: Negative for congestion and hearing loss. Eyes: Negative for redness and visual disturbance. Respiratory: Negative for cough, chest tightness and shortness of breath. Cardiovascular: Negative for chest pain and leg swelling. Gastrointestinal: Negative for abdominal pain and nausea. Endocrine: Negative for polydipsia and polyuria. Genitourinary: Negative for dysuria and frequency. Musculoskeletal: Negative for arthralgias, back pain and neck pain. Skin: Negative for rash and wound. Allergic/Immunologic: Negative for environmental allergies. Neurological: Negative for dizziness, weakness and headaches. Hematological: Negative for adenopathy. Does not bruise/bleed easily. Psychiatric/Behavioral: Negative for sleep disturbance. The patient is not nervous/anxious. Vitals:    06/18/19 1254   BP: 130/60   Pulse: 68   Resp: 14   SpO2: 98%   Weight: 146 lb (66.2 kg)   Height: 5' 1\" (1.549 m)     Body mass index is 27.59 kg/m². Wt Readings from Last 3 Encounters:   06/18/19 146 lb (66.2 kg)   05/14/19 142 lb 3.2 oz (64.5 kg)   03/22/19 144 lb (65.3 kg)     BP Readings from Last 3 Encounters:   06/18/19 130/60   06/15/19 95/67   05/14/19 111/71       Physical Exam   Constitutional: She is oriented to person, place, and time.  She appears well-developed and well-nourished. Cardiovascular: Normal rate and regular rhythm. No murmur heard. Pulmonary/Chest: Effort normal and breath sounds normal.   Abdominal: Soft. Bowel sounds are normal. She exhibits no distension. There is no tenderness. Musculoskeletal: She exhibits no edema. Neurological: She is alert and oriented to person, place, and time. Skin: Skin is warm and dry. No rash noted. Psychiatric: She has a normal mood and affect. Assessment/Plan:    1. Acute superficial gastritis without hemorrhage  Stable. Continue Rx. Her gastric biopsy showed acute and chronic gastritis, with H. Pylori organisms. She is referred to her gastroenterologist to discuss further treatment of this infection with antibiotics.    - [place] DISCHARGE MEDS RECONCILED W/ CURRENT OUTPATIENT MED LIST        Medical Decision Making: moderate complexity

## 2019-06-25 ENCOUNTER — OFFICE VISIT (OUTPATIENT)
Dept: GYNECOLOGY | Age: 54
End: 2019-06-25
Payer: COMMERCIAL

## 2019-06-25 VITALS
HEIGHT: 62 IN | RESPIRATION RATE: 17 BRPM | DIASTOLIC BLOOD PRESSURE: 72 MMHG | WEIGHT: 126 LBS | HEART RATE: 67 BPM | SYSTOLIC BLOOD PRESSURE: 112 MMHG | BODY MASS INDEX: 23.19 KG/M2

## 2019-06-25 DIAGNOSIS — Z11.3 ROUTINE SCREENING FOR STI (SEXUALLY TRANSMITTED INFECTION): ICD-10-CM

## 2019-06-25 DIAGNOSIS — Z12.31 SCREENING MAMMOGRAM, ENCOUNTER FOR: Primary | ICD-10-CM

## 2019-06-25 PROCEDURE — 99396 PREV VISIT EST AGE 40-64: CPT | Performed by: OBSTETRICS & GYNECOLOGY

## 2019-06-25 RX ORDER — ESOMEPRAZOLE MAGNESIUM 20 MG/1
20 FOR SUSPENSION ORAL
COMMUNITY
Start: 2019-06-22 | End: 2021-04-19 | Stop reason: ALTCHOICE

## 2019-06-25 RX ORDER — BISMUTH SUBSALICYLATE 262 MG/1
2 TABLET, CHEWABLE ORAL
COMMUNITY
Start: 2019-06-22

## 2019-06-25 RX ORDER — METRONIDAZOLE 250 MG/1
250 TABLET ORAL
COMMUNITY
Start: 2019-06-22 | End: 2022-02-27 | Stop reason: ALTCHOICE

## 2019-06-25 RX ORDER — TETRACYCLINE HYDROCHLORIDE 500 MG/1
500 CAPSULE ORAL
COMMUNITY
Start: 2019-06-22 | End: 2022-02-27 | Stop reason: ALTCHOICE

## 2019-06-25 ASSESSMENT — ENCOUNTER SYMPTOMS
RESPIRATORY NEGATIVE: 1
EYES NEGATIVE: 1
CONSTIPATION: 1

## 2019-06-25 NOTE — PROGRESS NOTES
Term  AB Living   3 1 1 0 2 1   SAB TAB Ectopic Molar Multiple Live Births   0 2 0   0        # Outcome Date GA Lbr Wilmar/2nd Weight Sex Delivery Anes PTL Lv   3 TAB            2 TAB            1 Term              Social History     Socioeconomic History    Marital status: Single     Spouse name: Not on file    Number of children: 1    Years of education: Not on file    Highest education level: Not on file   Occupational History    Not on file   Social Needs    Financial resource strain: Not on file    Food insecurity:     Worry: Not on file     Inability: Not on file    Transportation needs:     Medical: Not on file     Non-medical: Not on file   Tobacco Use    Smoking status: Former Smoker     Packs/day: 1.00     Years: 30.00     Pack years: 30.00     Types: Cigarettes     Last attempt to quit: 2009     Years since quitting: 10.0    Smokeless tobacco: Never Used   Substance and Sexual Activity    Alcohol use:  Yes     Alcohol/week: 0.6 oz     Types: 1 Cans of beer per week     Comment: Occasional    Drug use: No     Comment: marijuana- DAILY On 10/13/15 with mom in room states last used marijuana several months ago    Sexual activity: Yes     Partners: Male     Birth control/protection: Condom   Lifestyle    Physical activity:     Days per week: Not on file     Minutes per session: Not on file    Stress: Not on file   Relationships    Social connections:     Talks on phone: Not on file     Gets together: Not on file     Attends Christian service: Not on file     Active member of club or organization: Not on file     Attends meetings of clubs or organizations: Not on file     Relationship status: Not on file    Intimate partner violence:     Fear of current or ex partner: Not on file     Emotionally abused: Not on file     Physically abused: Not on file     Forced sexual activity: Not on file   Other Topics Concern    Not on file   Social History Narrative    Not on file     Allergies Allergen Reactions    Morphine Swelling     POSSIBLE DELAYED  SWELLING OF THROAT 12 HOURS LATER  USED BENADRYL TO REVERSE    Lamictal [Lamotrigine] Rash    Naproxen Other (See Comments)     Stomach     Sulfa Antibiotics Swelling     AND RASH     Outpatient Medications Marked as Taking for the 6/25/19 encounter (Office Visit) with Alisson Jack MD   Medication Sig Dispense Refill    bismuth subsalicylate (PEPTO-BISMOL) 262 MG chewable tablet Take 2 tablets by mouth      esomeprazole Magnesium (NEXIUM) 20 MG PACK Take 20 mg by mouth      tetracycline (ACHROMYCIN;SUMYCIN) 500 MG capsule Take 500 mg by mouth      metroNIDAZOLE (FLAGYL) 250 MG tablet Take 250 mg by mouth      pantoprazole (PROTONIX) 20 MG tablet Take 2 tablets by mouth 2 times daily 120 tablet 0    promethazine (PHENERGAN) 12.5 MG tablet Take 1 tablet by mouth 3 times daily as needed for Nausea 15 tablet 0    psyllium (METAMUCIL SMOOTH TEXTURE) 28 % packet Take 1 packet by mouth daily Take with full glass of h20 or juice 30 packet 0    levothyroxine (SYNTHROID) 50 MCG tablet TAKE ONE TABLET BY MOUTH DAILY 30 tablet 11    vitamin D (ERGOCALCIFEROL) 69359 units CAPS capsule TAKE ONE CAPSULE BY MOUTH WEEKLY 4 capsule 11    tiZANidine (ZANAFLEX) 2 MG tablet Take 1 tablet by mouth 3 times daily as needed (muscle spasm) 30 tablet 0     Family History   Problem Relation Age of Onset    High Cholesterol Mother     High Blood Pressure Father     Heart Disease Father     COPD Father     Stroke Maternal Grandfather     Thyroid Disease Sister         Hypothyroidism     /72 (Site: Right Upper Arm, Position: Sitting, Cuff Size: Medium Adult)   Pulse 67   Resp 17   Ht 5' 2\" (1.575 m)   Wt 126 lb (57.2 kg)   LMP 11/14/2017   BMI 23.05 kg/m²       Objective:   Physical Exam   Constitutional: She is oriented to person, place, and time. She appears well-developed and well-nourished. No distress.    HENT:   Head: Normocephalic and atraumatic. Eyes: Pupils are equal, round, and reactive to light. EOM are normal.   Neck: Normal range of motion. Neck supple. No thyromegaly present. Cardiovascular: Normal rate, regular rhythm and normal heart sounds. Exam reveals no gallop and no friction rub. No murmur heard. Pulmonary/Chest: Effort normal and breath sounds normal. No respiratory distress. She has no wheezes. She has no rales. No breast tenderness, discharge or bleeding. Abdominal: Soft. She exhibits no distension and no mass. There is no hepatomegaly. There is no tenderness. There is no rebound and no guarding. No hernia. Genitourinary: Vagina normal and uterus normal. Rectal exam shows no external hemorrhoid and no fissure. No breast tenderness, discharge or bleeding. Pelvic exam was performed with patient supine. No labial fusion. There is no rash, tenderness, lesion or injury on the right labia. There is no rash, tenderness, lesion or injury on the left labia. Uterus is not deviated, not enlarged, not fixed and not tender. Cervix exhibits no motion tenderness, no discharge and no friability. Right adnexum displays no mass, no tenderness and no fullness. Left adnexum displays no mass, no tenderness and no fullness. No erythema, tenderness or bleeding in the vagina. No foreign body in the vagina. No signs of injury around the vagina. No vaginal discharge found. Genitourinary Comments: Normal urethral meatus, nl urethra, nl bladder. Musculoskeletal: Normal range of motion. Lymphadenopathy:        Right: No inguinal adenopathy present. Left: No inguinal adenopathy present. Neurological: She is alert and oriented to person, place, and time. She has normal reflexes. Skin: Skin is warm and dry. Psychiatric: She has a normal mood and affect. Her behavior is normal. Judgment and thought content normal.       Assessment:      1. Annual  2. Menopause  3. Hx rectal ca  4. STI screen      Plan:      1.  Pap, calcium,

## 2019-06-26 LAB
C TRACH DNA GENITAL QL NAA+PROBE: NEGATIVE
N. GONORRHOEAE DNA: NEGATIVE

## 2019-07-02 ENCOUNTER — TELEPHONE (OUTPATIENT)
Dept: GYNECOLOGY | Age: 54
End: 2019-07-02

## 2019-08-05 ENCOUNTER — TELEPHONE (OUTPATIENT)
Dept: INTERNAL MEDICINE CLINIC | Age: 54
End: 2019-08-05

## 2019-08-05 NOTE — TELEPHONE ENCOUNTER
Oarrs ran: 08/05/19  Last filled: 07/19/19  Quantity of 60 for 30 days     LOV: 06/18/19  No future appt.

## 2019-08-06 NOTE — TELEPHONE ENCOUNTER
I would need a police report of missing  Medicine. She would not be due for her next medicine till 8/18/19. Controlled Substance Monitoring:    Acute and Chronic Pain Monitoring:   RX Monitoring 8/6/2019   Attestation -   Acute Pain Prescriptions -   Periodic Controlled Substance Monitoring Potential drug abuse or diversion identified, see note documentation.    Chronic Pain > 80 MEDD -

## 2019-08-07 NOTE — TELEPHONE ENCOUNTER
Patient was advised she needs a police report. Stated she is not going to drive up to [place] to file a report. Stated Dr. Prince Lemus just chooses not to write the prescription. Patient was advised by law doctors can't just write a prescription when the patient is not due.

## 2019-08-20 ENCOUNTER — HOSPITAL ENCOUNTER (OUTPATIENT)
Dept: MAMMOGRAPHY | Age: 54
Discharge: HOME | End: 2019-08-20
Payer: COMMERCIAL

## 2019-08-20 DIAGNOSIS — Z12.31 SCREENING MAMMOGRAM, ENCOUNTER FOR: ICD-10-CM

## 2019-08-20 PROCEDURE — 77063 BREAST TOMOSYNTHESIS BI: CPT

## 2019-08-21 ENCOUNTER — TELEPHONE (OUTPATIENT)
Dept: INTERNAL MEDICINE CLINIC | Age: 54
End: 2019-08-21

## 2019-08-21 ENCOUNTER — TELEPHONE (OUTPATIENT)
Dept: GYNECOLOGY | Age: 54
End: 2019-08-21

## 2019-08-21 DIAGNOSIS — Z12.31 ENCOUNTER FOR SCREENING MAMMOGRAM FOR BREAST CANCER: ICD-10-CM

## 2019-08-21 DIAGNOSIS — Z15.01 MONOALLELIC MUTATION OF CHEK2 GENE IN FEMALE PATIENT: Primary | ICD-10-CM

## 2019-08-21 DIAGNOSIS — Z15.09 MONOALLELIC MUTATION OF CHEK2 GENE IN FEMALE PATIENT: Primary | ICD-10-CM

## 2019-08-21 DIAGNOSIS — Z15.02 MONOALLELIC MUTATION OF CHEK2 GENE IN FEMALE PATIENT: Primary | ICD-10-CM

## 2019-08-21 DIAGNOSIS — Z15.89 MONOALLELIC MUTATION OF CHEK2 GENE IN FEMALE PATIENT: Primary | ICD-10-CM

## 2019-08-21 NOTE — TELEPHONE ENCOUNTER
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 Pt found clonazePAM (KLONOPIN) 0.5 MG tablet    Pt wanted  to know.

## 2019-08-22 DIAGNOSIS — R89.8 ABNORMAL GENETIC TEST: Primary | ICD-10-CM

## 2019-08-22 NOTE — TELEPHONE ENCOUNTER
Called left detailed message on her voice mail explaining, if she has questions she needs to call back.

## 2019-08-22 NOTE — TELEPHONE ENCOUNTER
Call patient that I put order in but test needs to be precertified. I faxed it over. If she does not hear anything in one week, she can call us back. I would recommend she sees Dr. Rolando Mendieta or a breast MD if she has not done so yet.

## 2019-08-26 ENCOUNTER — TELEPHONE (OUTPATIENT)
Dept: INTERNAL MEDICINE CLINIC | Age: 54
End: 2019-08-26

## 2019-08-26 DIAGNOSIS — F41.9 ANXIETY: ICD-10-CM

## 2019-08-26 NOTE — TELEPHONE ENCOUNTER
clonazePAM (KLONOPIN) 0.5 MG tablet [[id number]]- pt stated pharmacy told her she has no refill and she requesting refill pls advise          [address] [place] [address] Drive [address] [place] [address] 9 - F [phone]

## 2019-08-27 RX ORDER — CLONAZEPAM 0.5 MG/1
TABLET ORAL
Qty: 60 TABLET | Refills: 1 | OUTPATIENT
Start: 2019-08-27 | End: 2020-01-07

## 2019-09-04 ENCOUNTER — HOSPITAL ENCOUNTER (OUTPATIENT)
Dept: MRI IMAGING | Age: 54
Discharge: HOME | End: 2019-09-04
Payer: COMMERCIAL

## 2019-09-04 DIAGNOSIS — R89.8 ABNORMAL GENETIC TEST: ICD-10-CM

## 2019-09-04 DIAGNOSIS — Z15.09 MONOALLELIC MUTATION OF CHEK2 GENE IN FEMALE PATIENT: ICD-10-CM

## 2019-09-04 DIAGNOSIS — Z12.31 ENCOUNTER FOR SCREENING MAMMOGRAM FOR BREAST CANCER: ICD-10-CM

## 2019-09-04 DIAGNOSIS — Z15.01 MONOALLELIC MUTATION OF CHEK2 GENE IN FEMALE PATIENT: ICD-10-CM

## 2019-09-04 DIAGNOSIS — Z15.02 MONOALLELIC MUTATION OF CHEK2 GENE IN FEMALE PATIENT: ICD-10-CM

## 2019-09-04 DIAGNOSIS — Z15.89 MONOALLELIC MUTATION OF CHEK2 GENE IN FEMALE PATIENT: ICD-10-CM

## 2019-09-04 PROCEDURE — 6360000004 HC RX CONTRAST MEDICATION: Performed by: OBSTETRICS & GYNECOLOGY

## 2019-09-04 PROCEDURE — 77049 MRI BREAST C-+ W/CAD BI: CPT

## 2019-09-04 PROCEDURE — A9576 INJ PROHANCE MULTIPACK: HCPCS | Performed by: OBSTETRICS & GYNECOLOGY

## 2019-09-04 RX ADMIN — GADOTERIDOL 14 ML: 279.3 INJECTION, SOLUTION INTRAVENOUS at 18:00

## 2019-09-09 DIAGNOSIS — M62.838 MUSCLE SPASM: ICD-10-CM

## 2019-09-09 RX ORDER — TIZANIDINE 2 MG/1
TABLET ORAL
Qty: 30 TABLET | Refills: 0 | Status: SHIPPED | OUTPATIENT
Start: 2019-09-09 | End: 2021-03-08 | Stop reason: SDUPTHER

## 2019-09-23 ENCOUNTER — TELEPHONE (OUTPATIENT)
Dept: INTERNAL MEDICINE CLINIC | Age: 54
End: 2019-09-23

## 2019-10-01 ENCOUNTER — TELEPHONE (OUTPATIENT)
Dept: INTERNAL MEDICINE CLINIC | Age: 54
End: 2019-10-01

## 2019-10-02 ENCOUNTER — TELEPHONE (OUTPATIENT)
Dept: INTERNAL MEDICINE CLINIC | Age: 54
End: 2019-10-02

## 2019-10-02 DIAGNOSIS — M54.2 NECK PAIN: Primary | ICD-10-CM

## 2019-10-04 ENCOUNTER — OFFICE VISIT (OUTPATIENT)
Dept: INTERNAL MEDICINE CLINIC | Age: 54
End: 2019-10-04
Payer: COMMERCIAL

## 2019-10-04 VITALS
HEART RATE: 72 BPM | BODY MASS INDEX: 26.31 KG/M2 | WEIGHT: 143 LBS | DIASTOLIC BLOOD PRESSURE: 70 MMHG | OXYGEN SATURATION: 98 % | HEIGHT: 62 IN | SYSTOLIC BLOOD PRESSURE: 130 MMHG | RESPIRATION RATE: 14 BRPM

## 2019-10-04 DIAGNOSIS — M79.7 FIBROMYALGIA: ICD-10-CM

## 2019-10-04 DIAGNOSIS — E55.9 VITAMIN D DEFICIENCY: ICD-10-CM

## 2019-10-04 DIAGNOSIS — M54.2 NECK PAIN: Primary | ICD-10-CM

## 2019-10-04 DIAGNOSIS — Z23 NEED FOR INFLUENZA VACCINATION: ICD-10-CM

## 2019-10-04 DIAGNOSIS — E03.9 ACQUIRED HYPOTHYROIDISM: ICD-10-CM

## 2019-10-04 DIAGNOSIS — Z13.1 DIABETES MELLITUS SCREENING: ICD-10-CM

## 2019-10-04 LAB
GLUCOSE FASTING: 89 MG/DL (ref 70–99)
TSH REFLEX: 1.71 UIU/ML (ref 0.27–4.2)
VITAMIN D 25-HYDROXY: 58.1 NG/ML

## 2019-10-04 PROCEDURE — G8482 FLU IMMUNIZE ORDER/ADMIN: HCPCS | Performed by: INTERNAL MEDICINE

## 2019-10-04 PROCEDURE — G8417 CALC BMI ABV UP PARAM F/U: HCPCS | Performed by: INTERNAL MEDICINE

## 2019-10-04 PROCEDURE — 90686 IIV4 VACC NO PRSV 0.5 ML IM: CPT | Performed by: INTERNAL MEDICINE

## 2019-10-04 PROCEDURE — 3017F COLORECTAL CA SCREEN DOC REV: CPT | Performed by: INTERNAL MEDICINE

## 2019-10-04 PROCEDURE — 90471 IMMUNIZATION ADMIN: CPT | Performed by: INTERNAL MEDICINE

## 2019-10-04 PROCEDURE — G8427 DOCREV CUR MEDS BY ELIG CLIN: HCPCS | Performed by: INTERNAL MEDICINE

## 2019-10-04 PROCEDURE — 99213 OFFICE O/P EST LOW 20 MIN: CPT | Performed by: INTERNAL MEDICINE

## 2019-10-04 PROCEDURE — 1036F TOBACCO NON-USER: CPT | Performed by: INTERNAL MEDICINE

## 2019-10-04 ASSESSMENT — PATIENT HEALTH QUESTIONNAIRE - PHQ9
SUM OF ALL RESPONSES TO PHQ QUESTIONS 1-9: 0
SUM OF ALL RESPONSES TO PHQ QUESTIONS 1-9: 0
1. LITTLE INTEREST OR PLEASURE IN DOING THINGS: 0
2. FEELING DOWN, DEPRESSED OR HOPELESS: 0
SUM OF ALL RESPONSES TO PHQ9 QUESTIONS 1 & 2: 0

## 2019-10-05 LAB
ESTIMATED AVERAGE GLUCOSE: 108.3 MG/DL
HBA1C MFR BLD: 5.4 %

## 2019-10-06 ASSESSMENT — ENCOUNTER SYMPTOMS
CHEST TIGHTNESS: 0
ABDOMINAL PAIN: 0
COUGH: 0
EYE REDNESS: 0
BACK PAIN: 0
SHORTNESS OF BREATH: 0
NAUSEA: 0

## 2019-10-29 ENCOUNTER — TELEPHONE (OUTPATIENT)
Dept: ENDOCRINOLOGY | Age: 54
End: 2019-10-29

## 2019-10-29 DIAGNOSIS — E03.9 ACQUIRED HYPOTHYROIDISM: Primary | ICD-10-CM

## 2019-11-01 ENCOUNTER — TELEPHONE (OUTPATIENT)
Dept: ENDOCRINOLOGY | Age: 54
End: 2019-11-01

## 2019-11-15 ENCOUNTER — OFFICE VISIT (OUTPATIENT)
Dept: ORTHOPEDIC SURGERY | Age: 54
End: 2019-11-15
Payer: COMMERCIAL

## 2019-11-15 VITALS
WEIGHT: 146 LBS | SYSTOLIC BLOOD PRESSURE: 103 MMHG | HEIGHT: 61 IN | HEART RATE: 67 BPM | DIASTOLIC BLOOD PRESSURE: 70 MMHG | BODY MASS INDEX: 27.56 KG/M2

## 2019-11-15 DIAGNOSIS — M50.00 HNP (HERNIATED NUCLEUS PULPOSUS) WITH MYELOPATHY, CERVICAL: Primary | ICD-10-CM

## 2019-11-15 DIAGNOSIS — R20.0 LOWER EXTREMITY NUMBNESS: ICD-10-CM

## 2019-11-15 PROCEDURE — 99243 OFF/OP CNSLTJ NEW/EST LOW 30: CPT | Performed by: PHYSICIAN ASSISTANT

## 2019-11-15 PROCEDURE — G8417 CALC BMI ABV UP PARAM F/U: HCPCS | Performed by: PHYSICIAN ASSISTANT

## 2019-11-15 PROCEDURE — G8427 DOCREV CUR MEDS BY ELIG CLIN: HCPCS | Performed by: PHYSICIAN ASSISTANT

## 2019-11-15 PROCEDURE — G8482 FLU IMMUNIZE ORDER/ADMIN: HCPCS | Performed by: PHYSICIAN ASSISTANT

## 2019-11-15 RX ORDER — IBUPROFEN 200 MG
200 TABLET ORAL EVERY 6 HOURS PRN
COMMUNITY
End: 2022-02-17

## 2019-11-20 ENCOUNTER — TELEPHONE (OUTPATIENT)
Dept: ORTHOPEDIC SURGERY | Age: 54
End: 2019-11-20

## 2019-11-21 ENCOUNTER — HOSPITAL ENCOUNTER (OUTPATIENT)
Dept: PHYSICAL THERAPY | Age: 54
Setting detail: THERAPIES SERIES
Discharge: HOME | End: 2019-11-21
Payer: COMMERCIAL

## 2019-11-21 PROCEDURE — 97012 MECHANICAL TRACTION THERAPY: CPT | Performed by: PHYSICAL THERAPIST

## 2019-11-21 PROCEDURE — 97110 THERAPEUTIC EXERCISES: CPT | Performed by: PHYSICAL THERAPIST

## 2019-11-21 PROCEDURE — 97161 PT EVAL LOW COMPLEX 20 MIN: CPT | Performed by: PHYSICAL THERAPIST

## 2019-11-21 PROCEDURE — 97140 MANUAL THERAPY 1/> REGIONS: CPT | Performed by: PHYSICAL THERAPIST

## 2019-11-25 ENCOUNTER — HOSPITAL ENCOUNTER (OUTPATIENT)
Dept: PHYSICAL THERAPY | Age: 54
Setting detail: THERAPIES SERIES
Discharge: HOME | End: 2019-11-25
Payer: COMMERCIAL

## 2019-11-25 ENCOUNTER — TELEPHONE (OUTPATIENT)
Dept: ORTHOPEDIC SURGERY | Age: 54
End: 2019-11-25

## 2019-11-25 PROCEDURE — 97140 MANUAL THERAPY 1/> REGIONS: CPT | Performed by: SPECIALIST/TECHNOLOGIST

## 2019-11-25 PROCEDURE — 97012 MECHANICAL TRACTION THERAPY: CPT | Performed by: SPECIALIST/TECHNOLOGIST

## 2019-11-25 PROCEDURE — 97110 THERAPEUTIC EXERCISES: CPT | Performed by: SPECIALIST/TECHNOLOGIST

## 2019-11-27 ENCOUNTER — APPOINTMENT (OUTPATIENT)
Dept: PHYSICAL THERAPY | Age: 54
End: 2019-11-27
Payer: COMMERCIAL

## 2019-11-27 ENCOUNTER — HOSPITAL ENCOUNTER (OUTPATIENT)
Dept: PHYSICAL THERAPY | Age: 54
Setting detail: THERAPIES SERIES
Discharge: HOME | End: 2019-11-27
Payer: COMMERCIAL

## 2019-11-29 ENCOUNTER — HOSPITAL ENCOUNTER (OUTPATIENT)
Dept: PHYSICAL THERAPY | Age: 54
Setting detail: THERAPIES SERIES
Discharge: HOME | End: 2019-11-29
Payer: COMMERCIAL

## 2019-11-29 PROCEDURE — 97110 THERAPEUTIC EXERCISES: CPT | Performed by: PHYSICAL THERAPIST

## 2019-11-29 PROCEDURE — 97140 MANUAL THERAPY 1/> REGIONS: CPT | Performed by: PHYSICAL THERAPIST

## 2019-11-29 PROCEDURE — 97012 MECHANICAL TRACTION THERAPY: CPT | Performed by: PHYSICAL THERAPIST

## 2019-12-02 ENCOUNTER — HOSPITAL ENCOUNTER (OUTPATIENT)
Dept: PHYSICAL THERAPY | Age: 54
Setting detail: THERAPIES SERIES
Discharge: HOME | End: 2019-12-02
Payer: COMMERCIAL

## 2019-12-02 PROCEDURE — 97012 MECHANICAL TRACTION THERAPY: CPT | Performed by: PHYSICAL THERAPIST

## 2019-12-02 PROCEDURE — 97110 THERAPEUTIC EXERCISES: CPT | Performed by: PHYSICAL THERAPIST

## 2019-12-02 PROCEDURE — 97140 MANUAL THERAPY 1/> REGIONS: CPT | Performed by: PHYSICAL THERAPIST

## 2019-12-06 ENCOUNTER — HOSPITAL ENCOUNTER (OUTPATIENT)
Dept: PHYSICAL THERAPY | Age: 54
Setting detail: THERAPIES SERIES
Discharge: HOME | End: 2019-12-06
Payer: COMMERCIAL

## 2019-12-06 PROCEDURE — 97012 MECHANICAL TRACTION THERAPY: CPT | Performed by: PHYSICAL THERAPIST

## 2019-12-06 PROCEDURE — 97140 MANUAL THERAPY 1/> REGIONS: CPT | Performed by: PHYSICAL THERAPIST

## 2019-12-06 PROCEDURE — 97110 THERAPEUTIC EXERCISES: CPT | Performed by: PHYSICAL THERAPIST

## 2019-12-09 ENCOUNTER — HOSPITAL ENCOUNTER (OUTPATIENT)
Dept: PHYSICAL THERAPY | Age: 54
Setting detail: THERAPIES SERIES
Discharge: HOME | End: 2019-12-09
Payer: COMMERCIAL

## 2019-12-09 PROCEDURE — 97110 THERAPEUTIC EXERCISES: CPT | Performed by: PHYSICAL THERAPIST

## 2019-12-09 PROCEDURE — 97140 MANUAL THERAPY 1/> REGIONS: CPT | Performed by: PHYSICAL THERAPIST

## 2019-12-09 PROCEDURE — 97012 MECHANICAL TRACTION THERAPY: CPT | Performed by: PHYSICAL THERAPIST

## 2019-12-13 ENCOUNTER — HOSPITAL ENCOUNTER (OUTPATIENT)
Dept: PHYSICAL THERAPY | Age: 54
Setting detail: THERAPIES SERIES
Discharge: HOME | End: 2019-12-13
Payer: COMMERCIAL

## 2019-12-13 ENCOUNTER — APPOINTMENT (OUTPATIENT)
Dept: PHYSICAL THERAPY | Age: 54
End: 2019-12-13
Payer: COMMERCIAL

## 2019-12-13 PROCEDURE — 97110 THERAPEUTIC EXERCISES: CPT | Performed by: SPECIALIST/TECHNOLOGIST

## 2019-12-13 PROCEDURE — 97140 MANUAL THERAPY 1/> REGIONS: CPT | Performed by: SPECIALIST/TECHNOLOGIST

## 2019-12-13 PROCEDURE — 97012 MECHANICAL TRACTION THERAPY: CPT | Performed by: SPECIALIST/TECHNOLOGIST

## 2019-12-17 ENCOUNTER — HOSPITAL ENCOUNTER (OUTPATIENT)
Dept: PHYSICAL THERAPY | Age: 54
Setting detail: THERAPIES SERIES
Discharge: HOME | End: 2019-12-17
Payer: COMMERCIAL

## 2019-12-17 PROCEDURE — 97110 THERAPEUTIC EXERCISES: CPT | Performed by: PHYSICAL THERAPIST

## 2019-12-17 PROCEDURE — 97012 MECHANICAL TRACTION THERAPY: CPT | Performed by: PHYSICAL THERAPIST

## 2019-12-17 PROCEDURE — 97140 MANUAL THERAPY 1/> REGIONS: CPT | Performed by: PHYSICAL THERAPIST

## 2019-12-19 ENCOUNTER — TELEPHONE (OUTPATIENT)
Dept: ORTHOPEDIC SURGERY | Age: 54
End: 2019-12-19

## 2019-12-19 ENCOUNTER — OFFICE VISIT (OUTPATIENT)
Dept: ORTHOPEDIC SURGERY | Age: 54
End: 2019-12-19
Payer: COMMERCIAL

## 2019-12-19 DIAGNOSIS — R20.2 PARESTHESIA OF BOTH LOWER EXTREMITIES: Primary | ICD-10-CM

## 2019-12-19 PROCEDURE — 95909 NRV CNDJ TST 5-6 STUDIES: CPT | Performed by: PHYSICAL MEDICINE & REHABILITATION

## 2019-12-19 PROCEDURE — 95886 MUSC TEST DONE W/N TEST COMP: CPT | Performed by: PHYSICAL MEDICINE & REHABILITATION

## 2019-12-20 ENCOUNTER — HOSPITAL ENCOUNTER (OUTPATIENT)
Dept: PHYSICAL THERAPY | Age: 54
Setting detail: THERAPIES SERIES
Discharge: HOME | End: 2019-12-20
Payer: COMMERCIAL

## 2019-12-20 PROCEDURE — 97140 MANUAL THERAPY 1/> REGIONS: CPT | Performed by: SPECIALIST/TECHNOLOGIST

## 2019-12-20 PROCEDURE — 97012 MECHANICAL TRACTION THERAPY: CPT | Performed by: SPECIALIST/TECHNOLOGIST

## 2019-12-20 PROCEDURE — 97110 THERAPEUTIC EXERCISES: CPT | Performed by: SPECIALIST/TECHNOLOGIST

## 2019-12-23 ENCOUNTER — HOSPITAL ENCOUNTER (OUTPATIENT)
Dept: PHYSICAL THERAPY | Age: 54
Setting detail: THERAPIES SERIES
Discharge: HOME | End: 2019-12-23
Payer: COMMERCIAL

## 2019-12-23 PROCEDURE — 97012 MECHANICAL TRACTION THERAPY: CPT | Performed by: PHYSICAL THERAPIST

## 2019-12-23 PROCEDURE — 97140 MANUAL THERAPY 1/> REGIONS: CPT | Performed by: PHYSICAL THERAPIST

## 2019-12-23 PROCEDURE — 97110 THERAPEUTIC EXERCISES: CPT | Performed by: PHYSICAL THERAPIST

## 2019-12-27 ENCOUNTER — HOSPITAL ENCOUNTER (OUTPATIENT)
Dept: PHYSICAL THERAPY | Age: 54
Setting detail: THERAPIES SERIES
Discharge: HOME | End: 2019-12-27
Payer: COMMERCIAL

## 2019-12-27 PROCEDURE — 97140 MANUAL THERAPY 1/> REGIONS: CPT | Performed by: SPECIALIST/TECHNOLOGIST

## 2019-12-27 PROCEDURE — 97012 MECHANICAL TRACTION THERAPY: CPT | Performed by: SPECIALIST/TECHNOLOGIST

## 2019-12-27 PROCEDURE — 97110 THERAPEUTIC EXERCISES: CPT | Performed by: SPECIALIST/TECHNOLOGIST

## 2020-01-03 ENCOUNTER — HOSPITAL ENCOUNTER (OUTPATIENT)
Dept: PHYSICAL THERAPY | Age: 55
Setting detail: THERAPIES SERIES
Discharge: HOME | End: 2020-01-03
Payer: COMMERCIAL

## 2020-01-03 PROCEDURE — 97110 THERAPEUTIC EXERCISES: CPT | Performed by: PHYSICAL THERAPIST

## 2020-01-03 PROCEDURE — 97140 MANUAL THERAPY 1/> REGIONS: CPT | Performed by: PHYSICAL THERAPIST

## 2020-01-03 NOTE — FLOWSHEET NOTE
[birth year]      Recertification will be due (POC Duration  / 90 days whichever is less): 1/13/20     Date of Surgery:        Visit # Insurance Allowable Auth Required   11 30 []  Yes []  No        Functional Scale:  NDI: 42%    Date assessed:   12/23/19     Latex Allergy:  [x]NO      []YES  Preferred Language for Healthcare:   [x]English       []other      Pain level:  2/10     SUBJECTIVE:  Reports feeling tightness in the back of the neck but overall has been feeling better. States she feels therapy has helped with her overall decrease of symptoms.       Type: [x]Constant   []Intermitment  []Radiating [x]Localized  []other:     Functional Limitations: []Sitting []Standing []Walking    []Squatting []Stairs                []ADL's  []Driving []Sports/Recreation  []Lifting/reaching     []Grooming  []Carrying []ADL's  []Driving       OBJECTIVE:     ROM Current (R) Current (L)   Cervical flex/ext 50/50    Cervical SB 30 30             Strength     Mid trap 4-/5 4-/5   Low trap 4/5 4/5               Gait: WNL    Joint mobility:    []Normal    [x]Hypo   []Hyper    Palpation: R upper trap, levator    Orthopedic Tests:  (-)Spurling's, Vertebral artery     RESTRICTIONS/PRECAUTIONS: Hx of cancer, OA, depression    Exercises/Interventions:   Exercise/Equipment Resistance/Repetitions Other/Last Progression          Stretching     Upper Trap Stretch 3x30'' B    Levator Scapula Stretch     Scalene Belt Stretch     Bear Hug Stretch      Doorway stretch   10\"x5 NV   Cervical ext towel stretch  15 x 5\"    Cross Arm Stretch            Exercises     Chin Tucks Seated/Supine 10x10''    Headlifts 15x    SA Punch   3# 2x15 B     Prone Row/Ext/ HAB/ Scap     TB Rows/ Ext Light blue 2x15 each    TB ER/ IR     No Money/ HAB Light green 2x10 / x 15    Cervical Isometrics            Manual     Cervical Traction 3'     Cervical PA Grade- 5'    Cervical Lateral Glide Grade- 3'       Levator Scapula Stretch     Thoracic PA Grade- listed. [] Progression is slowed due to complexities/Impairments listed. [] Progression has been slowed due to co-morbidities. [x] Plan just implemented, too soon to assess goals progression <30days   [] Goals require adjustment due to lack of progress  [] Patient is not progressing as expected and requires additional follow up with physician  [] Other    ASSESSMENT: Tolerated Rx without increase in pain symptoms but did fatigue easily with TBand exercises for scapular strengthening. Decrease c/o's cervical tightness following manual mobilization. Treatment/Activity Tolerance:  [x] Patient tolerated treatment well [] Patient limited by fatique  [] Patient limited by pain  [] Patient limited by other medical complications  [] Other:     Prognosis: [x] Good [] Fair  [] Poor     Patient Requires Follow-up: [x] Yes  [] No  Plan for Next Session:    PLAN:  [x] Continue per plan of care [] Alter current plan (see comments)  [] Plan of care initiated [] Hold pending MD visit [] Discharge      Electronically signed by:   Sundar Barbosa PT, OMT-C            Note: If patient does not return for scheduled/ recommended follow up visits, this note will serve as a discharge from care along with most recent update on progress.

## 2020-01-03 NOTE — FLOWSHEET NOTE
The [address] [facility],[address], [facility]      Physical Therapy  Cancellation/No-show Note  Patient Name:  Paz Green  :  1965   Date:  1/3/2020  Cancelled visits to date: 1  No-shows to date: 1    For today's appointment patient:  []  Cancelled  []  Rescheduled appointment  [x]  No-show     Reason given by patient:  []  Patient ill  []  Conflicting appointment  []  No transportation    []  Conflict with work  []  No reason given  []  Other:     Comments:      Electronically signed by:  Lety Allen [address] [place] [id number]

## 2020-01-07 ENCOUNTER — APPOINTMENT (OUTPATIENT)
Dept: PHYSICAL THERAPY | Age: 55
End: 2020-01-07
Payer: COMMERCIAL

## 2020-01-07 ENCOUNTER — HOSPITAL ENCOUNTER (OUTPATIENT)
Dept: PHYSICAL THERAPY | Age: 55
Setting detail: THERAPIES SERIES
Discharge: HOME | End: 2020-01-07
Payer: COMMERCIAL

## 2020-01-07 NOTE — FLOWSHEET NOTE
The [address] [place] [address],[facility] [address], [place]      Physical Therapy  Cancellation/No-show Note  Patient Name:  Sergei Parra  :  1965   Date:  2020  Cancelled visits to date: 2  No-shows to date: 1    For today's appointment patient:  [x]  Cancelled  [x]  Rescheduled appointment  []  No-show     Reason given by patient:  []  Patient ill  [x]  Conflicting appointment  []  No transportation    []  Conflict with work  []  No reason given  []  Other:     Comments:      Electronically signed by:  Iesha New PT, OMT-C

## 2020-01-08 ENCOUNTER — HOSPITAL ENCOUNTER (OUTPATIENT)
Dept: PHYSICAL THERAPY | Age: 55
Setting detail: THERAPIES SERIES
Discharge: HOME | End: 2020-01-08
Payer: COMMERCIAL

## 2020-01-08 PROCEDURE — 97140 MANUAL THERAPY 1/> REGIONS: CPT | Performed by: SPECIALIST/TECHNOLOGIST

## 2020-01-08 PROCEDURE — 97012 MECHANICAL TRACTION THERAPY: CPT | Performed by: SPECIALIST/TECHNOLOGIST

## 2020-01-08 PROCEDURE — 97110 THERAPEUTIC EXERCISES: CPT | Performed by: SPECIALIST/TECHNOLOGIST

## 2020-01-08 NOTE — FLOWSHEET NOTE
The [address] [place] [address] [facility]              Date:  2020    Patient Name:  Izabel Jose    :  1965  MRN: [id number]  Restrictions/Precautions:    Medical/Treatment Diagnosis Information:  · Diagnosis: Cervical HNP   M50.00  · Treatment Diagnosis: PT treatment diagnosis:  neck pain, radicular pain, postural deficits, weakness   X57.5  Insurance/Certification information:  PT Insurance Information: [employer]  30 visits/yr,  $0 copay  Physician Information:  Referring Practitioner: Valentino Krone, PA-C  Plan of care signed (Y/N):     Date of Patient follow up with Physician:   I  s this a Progress Report:     []  Yes  [x]  No        If Yes:  Date Range for reporting period:  Beginning 19  Ending 19    Progress report will be due (10 Rx or 30 days whichever is less):        Recertification will be due (POC Duration  / 90 days whichever is less): 20     Date of Surgery:        Visit # Insurance Allowable Auth Required   12 30 []  Yes []  No        Functional Scale:  NDI: 42%    Date assessed:   19     Latex Allergy:  [x]NO      []YES  Preferred Language for Healthcare:   [x]English       []other      Pain level:  4-5/10     SUBJECTIVE:  Reports the radicular symptoms in bilateral hands have improved since starting PT. Pt. Reports she continues to have muscle spasms in her neck.        Type: [x]Constant   []Intermitment  []Radiating [x]Localized  []other:     Functional Limitations: []Sitting []Standing []Walking    []Squatting []Stairs                []ADL's  []Driving []Sports/Recreation  []Lifting/reaching     []Grooming  []Carrying []ADL's  []Driving       OBJECTIVE:     ROM Current (R) Current (L)   Cervical flex/ext 50/50    Cervical SB 30 30             Strength     Mid trap 4-/5 4-/5   Low trap 4/5 4/5               Gait: WNL    Joint mobility:    []Normal    [x]Hypo   []Hyper    Palpation: R upper trap, levator    Orthopedic Tests: (-)Spurling's, Vertebral artery     RESTRICTIONS/PRECAUTIONS: Hx of cancer, OA, depression    Exercises/Interventions:   Exercise/Equipment Resistance/Repetitions Other/Last Progression          Stretching     Upper Trap Stretch 3x30'' B    Levator Scapula Stretch     Scalene Belt Stretch     Bear Hug Stretch      Doorway stretch   10\"x5 NV   Cervical ext towel stretch  15 x 5\"    Cross Arm Stretch            Exercises     Chin Tucks Seated/Supine 10x10''    Headlifts 15x    SA Punch   3# 2x15 B     Prone Row/Ext/ HAB/ Scap     TB Rows/ Ext Light blue 2x15 each    Napoleon and arrow Or. Loop x 15 ea    No Money/ HAB Light green 2x10 / x 20    Cervical Isometrics            Manual     Cervical Traction 3'     Cervical PA Grade- 5'    Cervical Lateral Glide Grade- 3'       Levator Scapula Stretch     Thoracic PA Grade-                             Therapeutic Exercise and NMR EXR  [x] (88859) Provided verbal/tactile cueing for activities related to strengthening, flexibility, endurance, ROM  for improvements in cervical, postural, scapular, scapulothoracic and UE control with self care, reaching, carrying, lifting, house/yardwork, driving/computer work.    [] (93972) Provided verbal/tactile cueing for activities related to improving balance, coordination, kinesthetic sense, posture, motor skill, proprioception  to assist with cervical, scapular, scapulothoracic and UE control with self care, reaching, carrying, lifting, house/yardwork, driving/computer work. Therapeutic Activities:    [] (18988 or 64576) Provided verbal/tactile cueing for activities related to improving balance, coordination, kinesthetic sense, posture, motor skill, proprioception and motor activation to allow for proper function of cervical, scapular, scapulothoracic and UE control with self care, carrying, lifting, driving/computer work.      Home Exercise Program:    [x] (07989) Reviewed/Progressed HEP activities related to strengthening, flexibility, with reaching prior level of function. [] Progressing: [] Met: [x] Not Met: [] Adjusted   2. Patient will demonstrate increased AROM to [facility] of cervical/thoracic spine to allow for proper joint functioning as indicated by patients Functional Deficits. [] Progressing: [x] Met: [] Not Met: [] Adjusted  3. Patient will demonstrate an increase in postural awareness and control and activation of  Deep cervical stabilizers to allow for proper functional mobility as indicated by patients Functional Deficits. [x] Progressing: [] Met: [x] Not Met: [] Adjusted  4. Patient will return to sleeping for 4 hours, functional activities without increased symptoms or restriction. [] Progressing: [] Met: [x] Not Met: [] Adjusted    Overall Progression Towards Functional goals/ Treatment Progress Update:  [x] Patient is progressing as expected towards functional goals listed. [] Progression is slowed due to complexities/Impairments listed. [] Progression has been slowed due to co-morbidities. [x] Plan just implemented, too soon to assess goals progression <30days   [] Goals require adjustment due to lack of progress  [] Patient is not progressing as expected and requires additional follow up with physician  [] Other    ASSESSMENT: Tolerated Rx without increase in pain symptoms but did fatigue easily with TBand exercises for scapular strengthening. Decrease c/o's cervical tightness following manual mobilization.       Treatment/Activity Tolerance:  [x] Patient tolerated treatment well [] Patient limited by fatique  [] Patient limited by pain  [] Patient limited by other medical complications  [] Other:     Prognosis: [x] Good [] Fair  [] Poor     Patient Requires Follow-up: [x] Yes  [] No  Plan for Next Session:    PLAN:  [x] Continue per plan of care [] Alter current plan (see comments)  [] Plan of care initiated [] Hold pending MD visit [] Discharge      Electronically signed by:   Hero Grajeda, [address]

## 2020-01-10 ENCOUNTER — APPOINTMENT (OUTPATIENT)
Dept: PHYSICAL THERAPY | Age: 55
End: 2020-01-10
Payer: COMMERCIAL

## 2020-01-10 ENCOUNTER — HOSPITAL ENCOUNTER (OUTPATIENT)
Dept: PHYSICAL THERAPY | Age: 55
Setting detail: THERAPIES SERIES
Discharge: HOME | End: 2020-01-10
Payer: COMMERCIAL

## 2020-01-10 PROCEDURE — 97110 THERAPEUTIC EXERCISES: CPT | Performed by: SPECIALIST/TECHNOLOGIST

## 2020-01-10 PROCEDURE — 97012 MECHANICAL TRACTION THERAPY: CPT | Performed by: SPECIALIST/TECHNOLOGIST

## 2020-01-10 PROCEDURE — 97140 MANUAL THERAPY 1/> REGIONS: CPT | Performed by: SPECIALIST/TECHNOLOGIST

## 2020-01-10 NOTE — FLOWSHEET NOTE
scapular, scapulothoracic and UE control with self care, reaching, carrying, lifting, house/yardwork, driving/computer work  [] (58159) Reviewed/Progressed HEP activities related to improving balance, coordination, kinesthetic sense, posture, motor skill, proprioception of cervical, scapular, scapulothoracic and UE control with self care, reaching, carrying, lifting, house/yardwork, driving/computer work      Manual Treatments:    [x] (89457) Provided manual therapy to mobilize soft tissue/joints of cervical/CT, scapular GHJ and UE for the purpose of decreasing headache, modulating pain, promoting relaxation,  increasing ROM, reducing/eliminating soft tissue swelling/inflammation/restriction, improving soft tissue extensibility and allowing for proper ROM for normal function with self care, reaching, carrying, lifting, house/yardwork, driving/computer work    Modalities:  Traction (I): 20#/10#  x10' (Traction unit is out of order)    Electric MHP 15',     Charges:  Timed Code Treatment Minutes: 30   Total Treatment Minutes: 45     [] EVAL (LOW) 63221 (typically 20 minutes face-to-face)  [] EVAL (MOD) 49445 (typically 30 minutes face-to-face)  [] EVAL (HIGH) 89112 (typically 45 minutes face-to-face)  [] RE-EVAL     [x] WP(94612) x 1    [] IONTO  [] NMR (80443) x     [] VASO  [x] Manual (13219) x1      [] Other:  [] TA x      [x] [facility] Traction (93249)  [] ES(attended) (81605)      [] ES (un) (07072):     GOALS:  Short Term Goals: To be achieved in: 2 weeks  1. Independent in HEP and progression per patient tolerance, in order to prevent re-injury. [] Progressing: [x] Met: [] Not Met: [] Adjusted   2. Patient will have a decrease in pain to facilitate improvement in movement, function, and ADLs as indicated by Functional Deficits. [] Progressing: [x] Met: [] Not Met: [] Adjusted    Long Term Goals: To be achieved in: 4 weeks  1.  Disability index score of 20% or less for the NDI to assist with reaching prior level of function. [] Progressing: [] Met: [x] Not Met: [] Adjusted   2. Patient will demonstrate increased AROM to [facility] of cervical/thoracic spine to allow for proper joint functioning as indicated by patients Functional Deficits. [] Progressing: [x] Met: [] Not Met: [] Adjusted  3. Patient will demonstrate an increase in postural awareness and control and activation of  Deep cervical stabilizers to allow for proper functional mobility as indicated by patients Functional Deficits. [x] Progressing: [] Met: [x] Not Met: [] Adjusted  4. Patient will return to sleeping for 4 hours, functional activities without increased symptoms or restriction. [] Progressing: [] Met: [x] Not Met: [] Adjusted    Overall Progression Towards Functional goals/ Treatment Progress Update:  [x] Patient is progressing as expected towards functional goals listed. [] Progression is slowed due to complexities/Impairments listed. [] Progression has been slowed due to co-morbidities. [x] Plan just implemented, too soon to assess goals progression <30days   [] Goals require adjustment due to lack of progress  [] Patient is not progressing as expected and requires additional follow up with physician  [] Other    ASSESSMENT: Tolerated Rx without increase in pain symptoms but did fatigue easily with TBand exercises for scapular strengthening. Decrease c/o's cervical tightness following manual mobilization.       Treatment/Activity Tolerance:  [x] Patient tolerated treatment well [] Patient limited by fatique  [] Patient limited by pain  [] Patient limited by other medical complications  [] Other:     Prognosis: [x] Good [] Fair  [] Poor     Patient Requires Follow-up: [x] Yes  [] No  Plan for Next Session:    PLAN:  [x] Continue per plan of care [] Alter current plan (see comments)  [] Plan of care initiated [] Hold pending MD visit [] Discharge      Electronically signed by:   Jesus Meza, PTA  [id number], Quincy Zoran PT, OMT-C            Note: If

## 2020-01-13 ENCOUNTER — HOSPITAL ENCOUNTER (OUTPATIENT)
Dept: PHYSICAL THERAPY | Age: 55
Setting detail: THERAPIES SERIES
Discharge: HOME | End: 2020-01-13
Payer: COMMERCIAL

## 2020-01-13 PROCEDURE — 97012 MECHANICAL TRACTION THERAPY: CPT | Performed by: PHYSICAL THERAPIST

## 2020-01-13 PROCEDURE — 97140 MANUAL THERAPY 1/> REGIONS: CPT | Performed by: PHYSICAL THERAPIST

## 2020-01-13 PROCEDURE — 97110 THERAPEUTIC EXERCISES: CPT | Performed by: PHYSICAL THERAPIST

## 2020-01-13 NOTE — PROGRESS NOTES
I have reviewed and agree to the content of the note created by the Physical Therapist Assistant.     Electronically signed by Morgan Poe PT

## 2020-01-13 NOTE — FLOWSHEET NOTE
[facility] [place] [address]              Date:  2020    Patient Name:  Andrei Scott    :  1965  MRN: [id number]  Restrictions/Precautions:    Medical/Treatment Diagnosis Information:  · Diagnosis: Cervical HNP   M50.00  · Treatment Diagnosis: PT treatment diagnosis:  neck pain, radicular pain, postural deficits, weakness   C56.9  Insurance/Certification information:  PT Insurance Information: [employer]  30 visits/yr,  $0 copay  Physician Information:  Referring Practitioner: Corrinne Sieving, PA-C  Plan of care signed (Y/N):     Date of Patient follow up with Physician:   I  s this a Progress Report:     []  Yes  [x]  No        If Yes:  Date Range for reporting period:  Beginning 19  Ending 19    Progress report will be due (10 Rx or 30 days whichever is less):  44      Recertification will be due (POC Duration  / 90 days whichever is less): 20     Date of Surgery:        Visit # Insurance Allowable Auth Required   14 30 []  Yes []  No        Functional Scale:  NDI: 42%    Date assessed:   19     Latex Allergy:  [x]NO      []YES  Preferred Language for Healthcare:   [x]English       []other      Pain level:  2/10     SUBJECTIVE:   Reports having some tightness/spasming throughout the neck and upper back today. States she did not sleep well last night due to spasms.     Type: [x]Constant   []Intermitment  []Radiating [x]Localized  []other:     Functional Limitations: []Sitting []Standing []Walking    []Squatting []Stairs                []ADL's  []Driving []Sports/Recreation  []Lifting/reaching     []Grooming  []Carrying []ADL's  []Driving       OBJECTIVE:     ROM Current (R) Current (L)   Cervical flex/ext 50/50    Cervical SB 30 30             Strength     Mid trap 4-/5 4-/5   Low trap 4/5 4/5               Gait: WNL    Joint mobility:    [x]Normal    []Hypo   []Hyper    Palpation: R upper trap, levator    Orthopedic Tests:  (-)Spurling's, note will serve as a discharge from care along with most recent update on progress.

## 2020-01-16 ENCOUNTER — HOSPITAL ENCOUNTER (OUTPATIENT)
Dept: PHYSICAL THERAPY | Age: 55
Setting detail: THERAPIES SERIES
Discharge: HOME | End: 2020-01-16
Payer: COMMERCIAL

## 2020-01-17 ENCOUNTER — APPOINTMENT (OUTPATIENT)
Dept: PHYSICAL THERAPY | Age: 55
End: 2020-01-17
Payer: COMMERCIAL

## 2020-01-22 ENCOUNTER — HOSPITAL ENCOUNTER (OUTPATIENT)
Dept: PHYSICAL THERAPY | Age: 55
Setting detail: THERAPIES SERIES
Discharge: HOME | End: 2020-01-22
Payer: COMMERCIAL

## 2020-01-22 PROCEDURE — 97012 MECHANICAL TRACTION THERAPY: CPT | Performed by: SPECIALIST/TECHNOLOGIST

## 2020-01-22 PROCEDURE — 97110 THERAPEUTIC EXERCISES: CPT | Performed by: SPECIALIST/TECHNOLOGIST

## 2020-01-22 PROCEDURE — 97140 MANUAL THERAPY 1/> REGIONS: CPT | Performed by: SPECIALIST/TECHNOLOGIST

## 2020-01-22 NOTE — PLAN OF CARE
[facility]               Physical Therapy Re-Certification Plan of Care/MD UPDATE        Dear Referring Practitioner: Dereck Shaver PA-C,    We had the pleasure of treating the following patient for physical therapy services at [address] [place] [address]. A summary of our findings can be found in the updated assessment below. This includes our plan of care. If you have any questions or concerns regarding these findings, please do not hesitate to contact me at the office phone number checked above.   Thank you for the referral.     Physician Signature:________________________________Date:__________________  By signing above (or electronic signature), therapists plan is approved by physician    Date Range Of Visits: 19 - 20  Total Visits to Date: 15  Overall Response to Treatment:   [x]Patient is responding well to treatment and improvement is noted with regards  to goals   []Patient should continue to improve in reasonable time if they continue HEP   []Patient has plateaued and is no longer responding to skilled PT intervention    []Patient is getting worse and would benefit from return to referring MD   []Patient unable to adhere to initial POC   []Other:   Plan: Continue PT 2 x a wk /for 2 wks    Date:  2020    Patient Name:  General Park    :  1965  MRN: [id number]  Restrictions/Precautions:    Medical/Treatment Diagnosis Information:  · Diagnosis: Cervical HNP   M50.00  · Treatment Diagnosis: PT treatment diagnosis:  neck pain, radicular pain, postural deficits, weakness   Y84.0  Insurance/Certification information:  PT Insurance Information: [employer]  30 visits/yr,  $0 copay  Physician Information:  Referring Practitioner: Dereck Shaver PA-C  Plan of care signed (Y/N):     Date of Patient follow up with Physician:     s guero a Progress Report:     [x]  Yes  []  No        If Yes:  Date Range for reporting period:  Beginning 11/21/19  Ending 1/22/20    Progress report will be due (25 Rx or 30 days whichever is less):  7/4/43      Recertification will be due (POC Duration  / 90 days whichever is less): 2/5/20     Date of Surgery:         Visit # Insurance Allowable Auth Required   15 30 []  Yes []  No        Functional Scale:  NDI: 36%    Date assessed:   1/22/20     Latex Allergy:  [x]NO      []YES  Preferred Language for Healthcare:   [x]English       []other      Pain level:  6/10     SUBJECTIVE:   Reports having some tightness/spasming throughout the neck and upper back today. Pt. Reports she thinks her neck spasms are from traveling over the weekend. Type: [x]Constant   []Intermitment  []Radiating [x]Localized  []other:     Functional Limitations: []Sitting []Standing []Walking    []Squatting []Stairs                []ADL's  []Driving []Sports/Recreation  []Lifting/reaching     []Grooming  []Carrying []ADL's  []Driving       OBJECTIVE:     ROM Current (R) Current (L)   Cervical flex/ext 55/50    Cervical SB 32 39             Strength     Mid trap 4-/5 4-/5   Low trap 4/5 4/5               Gait: WNL    Joint mobility:    [x]Normal    []Hypo   []Hyper    Palpation: R upper trap, levator    Orthopedic Tests:  (-)Spurling's, Vertebral artery     RESTRICTIONS/PRECAUTIONS: Hx of cancer, OA, depression    Exercises/Interventions:   Exercise/Equipment Resistance/Repetitions Other/Last Progression          Stretching     Upper Trap Stretch 3x30'' B    Levator Scapula Stretch     Scalene Belt Stretch     Bear Hug Stretch      Doorway stretch   10\"x5 NV   Cervical ext towel stretch  15 x 5\"    Cross Arm Stretch            Exercises     Chin Tucks Seated/Supine 10x10''    Headlifts 15x    SA Punch   3# 2x15 B     Prone Row/Ext/ HAB/ Scap     TB Rows/ Ext Light blue 2x15 each    [place] and arrow Or.  Loop x 15 ea    No Money/ HAB Light green 2x15 / x 30    Cervical Isometrics            Manual     Cervical Traction 3' Cervical PA Grade- 5'    Cervical Lateral Glide Grade- 3'       Levator Scapula Stretch     Thoracic PA Grade-                             Therapeutic Exercise and NMR EXR  [x] (50191) Provided verbal/tactile cueing for activities related to strengthening, flexibility, endurance, ROM  for improvements in cervical, postural, scapular, scapulothoracic and UE control with self care, reaching, carrying, lifting, house/yardwork, driving/computer work.    [] (94558) Provided verbal/tactile cueing for activities related to improving balance, coordination, kinesthetic sense, posture, motor skill, proprioception  to assist with cervical, scapular, scapulothoracic and UE control with self care, reaching, carrying, lifting, house/yardwork, driving/computer work. Therapeutic Activities:    [] (28632 or 35457) Provided verbal/tactile cueing for activities related to improving balance, coordination, kinesthetic sense, posture, motor skill, proprioception and motor activation to allow for proper function of cervical, scapular, scapulothoracic and UE control with self care, carrying, lifting, driving/computer work.      Home Exercise Program:    [x] (15781) Reviewed/Progressed HEP activities related to strengthening, flexibility, endurance, ROM of cervical, scapular, scapulothoracic and UE control with self care, reaching, carrying, lifting, house/yardwork, driving/computer work  [] (81129) Reviewed/Progressed HEP activities related to improving balance, coordination, kinesthetic sense, posture, motor skill, proprioception of cervical, scapular, scapulothoracic and UE control with self care, reaching, carrying, lifting, house/yardwork, driving/computer work      Manual Treatments:    [x] (33526) Provided manual therapy to mobilize soft tissue/joints of cervical/CT, scapular GHJ and UE for the purpose of decreasing headache, modulating pain, promoting relaxation,  increasing ROM, reducing/eliminating soft tissue swelling/inflammation/restriction, improving soft tissue extensibility and allowing for proper ROM for normal function with self care, reaching, carrying, lifting, house/yardwork, driving/computer work    Modalities:  Traction (I): 22#/11#  x10'     MHP 15'    Charges:  Timed Code Treatment Minutes: 40   Total Treatment Minutes: 65   Pt. Was in a hour time slot   [] EVAL (LOW) 15816 (typically 20 minutes face-to-face)  [] EVAL (MOD) 91636 (typically 30 minutes face-to-face)  [] EVAL (HIGH) 93317 (typically 45 minutes face-to-face)  [] RE-EVAL     [x] CF(13113) x 2    [] IONTO  [] NMR (04884) x     [] VASO  [x] Manual (21124) x1      [] Other:  [] TA x      [x] Mech Traction (72341)  [] ES(attended) (94913)      [] ES (un) (78281):     GOALS:  Short Term Goals: To be achieved in: 2 weeks  1. Independent in HEP and progression per patient tolerance, in order to prevent re-injury. [] Progressing: [x] Met: [] Not Met: [] Adjusted   2. Patient will have a decrease in pain to facilitate improvement in movement, function, and ADLs as indicated by Functional Deficits. [] Progressing: [x] Met: [] Not Met: [] Adjusted    Long Term Goals: To be achieved in: 4 weeks  1. Disability index score of 20% or less for the NDI to assist with reaching prior level of function. [] Progressing: [] Met: [x] Not Met: [] Adjusted   2. Patient will demonstrate increased AROM to [facility] of cervical/thoracic spine to allow for proper joint functioning as indicated by patients Functional Deficits. [] Progressing: [x] Met: [] Not Met: [] Adjusted  3. Patient will demonstrate an increase in postural awareness and control and activation of  Deep cervical stabilizers to allow for proper functional mobility as indicated by patients Functional Deficits. [x] Progressing: [] Met: [x] Not Met: [] Adjusted  4. Patient will return to sleeping for 4 hours, functional activities without increased symptoms or restriction.    [] Progressing: [] Met: [x] Not Met: [] Adjusted    Overall Progression Towards Functional goals/ Treatment Progress Update:  [x] Patient is progressing as expected towards functional goals listed. [] Progression is slowed due to complexities/Impairments listed. [] Progression has been slowed due to co-morbidities. [] Plan just implemented, too soon to assess goals progression <30days   [] Goals require adjustment due to lack of progress  [] Patient is not progressing as expected and requires additional follow up with physician  [] Other    ASSESSMENT:  Cueing needed to maintain good postural alignment with scapular stabilization strengthening. Continues to fatigue easily with strengthening activities. Treatment/Activity Tolerance:  [x] Patient tolerated treatment well [] Patient limited by fatique  [] Patient limited by pain  [] Patient limited by other medical complications  [] Other:     Prognosis: [x] Good [] Fair  [] Poor     Patient Requires Follow-up: [x] Yes  [] No  Plan for Next Session:    PLAN:  [x] Continue per plan of care [] Alter current plan (see comments)  [] Plan of care initiated [] Hold pending MD visit [] Discharge      Electronically signed by:  Alda Felix, [address], Merline Skill PT, OMT-C            Note: If patient does not return for scheduled/ recommended follow up visits, this note will serve as a discharge from care along with most recent update on progress.

## 2020-01-22 NOTE — FLOWSHEET NOTE
The [facility] - Orthopaedics and [facility] Rehabilitation, [place]               Physical Therapy Re-Certification Plan of Care/MD UPDATE        Dear Referring Practitioner: Rudy Rivera PA-C,    We had the pleasure of treating the following patient for physical therapy services at [address] [place] [address]. A summary of our findings can be found in the updated assessment below. This includes our plan of care. If you have any questions or concerns regarding these findings, please do not hesitate to contact me at the office phone number checked above.   Thank you for the referral.     Physician Signature:________________________________Date:__________________  By signing above (or electronic signature), therapists plan is approved by physician    Date Range Of Visits: 19 - 20  Total Visits to Date: 15  Overall Response to Treatment:   [x]Patient is responding well to treatment and improvement is noted with regards  to goals   []Patient should continue to improve in reasonable time if they continue HEP   []Patient has plateaued and is no longer responding to skilled PT intervention    []Patient is getting worse and would benefit from return to referring MD   []Patient unable to adhere to initial POC   []Other:   Plan: Continue PT 2 x a wk /for 2 wks    Date:  2020    Patient Name:  Leonel Cramer    :  1965  MRN: [id number]  Restrictions/Precautions:    Medical/Treatment Diagnosis Information:  · Diagnosis: Cervical HNP   M50.00  · Treatment Diagnosis: PT treatment diagnosis:  neck pain, radicular pain, postural deficits, weakness   J68.9  Insurance/Certification information:  PT Insurance Information: [employer]  30 visits/yr,  $0 copay  Physician Information:  Referring Practitioner: Rudy Rivera PA-C  Plan of care signed (Y/N):     Date of Patient follow up with Physician:     luther davey Progress Report:     [x]  Yes  []  No        If Yes:  Date Range for reporting swelling/inflammation/restriction, improving soft tissue extensibility and allowing for proper ROM for normal function with self care, reaching, carrying, lifting, house/yardwork, driving/computer work    Modalities:  Traction (I): 22#/11#  x10'     MHP 15'    Charges:  Timed Code Treatment Minutes: 40   Total Treatment Minutes: 65   Pt. Was in a hour time slot   [] EVAL (LOW) 53228 (typically 20 minutes face-to-face)  [] EVAL (MOD) 65468 (typically 30 minutes face-to-face)  [] EVAL (HIGH) 50859 (typically 45 minutes face-to-face)  [] RE-EVAL     [x] SL(63029) x 2    [] IONTO  [] NMR (54319) x     [] VASO  [x] Manual (40640) x1      [] Other:  [] TA x      [x] Mech Traction (29497)  [] ES(attended) (88911)      [] ES (un) (60699):     GOALS:  Short Term Goals: To be achieved in: 2 weeks  1. Independent in HEP and progression per patient tolerance, in order to prevent re-injury. [] Progressing: [x] Met: [] Not Met: [] Adjusted   2. Patient will have a decrease in pain to facilitate improvement in movement, function, and ADLs as indicated by Functional Deficits. [] Progressing: [x] Met: [] Not Met: [] Adjusted    Long Term Goals: To be achieved in: 4 weeks  1. Disability index score of 20% or less for the NDI to assist with reaching prior level of function. [] Progressing: [] Met: [x] Not Met: [] Adjusted   2. Patient will demonstrate increased AROM to [facility] of cervical/thoracic spine to allow for proper joint functioning as indicated by patients Functional Deficits. [] Progressing: [x] Met: [] Not Met: [] Adjusted  3. Patient will demonstrate an increase in postural awareness and control and activation of  Deep cervical stabilizers to allow for proper functional mobility as indicated by patients Functional Deficits. [x] Progressing: [] Met: [x] Not Met: [] Adjusted  4. Patient will return to sleeping for 4 hours, functional activities without increased symptoms or restriction.    [] Progressing: [] Met: [x] Not

## 2020-01-31 ENCOUNTER — APPOINTMENT (OUTPATIENT)
Dept: PHYSICAL THERAPY | Age: 55
End: 2020-01-31
Payer: COMMERCIAL

## 2020-02-06 ENCOUNTER — TELEPHONE (OUTPATIENT)
Dept: ORTHOPEDIC SURGERY | Age: 55
End: 2020-02-06

## 2020-02-06 RX ORDER — CLONAZEPAM 0.5 MG/1
TABLET ORAL
Qty: 60 TABLET | Refills: 0 | OUTPATIENT
Start: 2020-02-06 | End: 2020-03-08

## 2020-02-06 RX ORDER — GABAPENTIN 300 MG/1
CAPSULE ORAL
Qty: 60 CAPSULE | Refills: 1 | OUTPATIENT
Start: 2020-02-06 | End: 2020-02-07 | Stop reason: SDUPTHER

## 2020-02-06 RX ORDER — CLONAZEPAM 0.5 MG/1
TABLET ORAL
Qty: 60 TABLET | Refills: 1 | OUTPATIENT
Start: 2020-02-06 | End: 2020-04-03

## 2020-02-06 RX ORDER — GABAPENTIN 300 MG/1
CAPSULE ORAL
Qty: 60 CAPSULE | Refills: 1 | OUTPATIENT
Start: 2020-02-06 | End: 2020-03-06

## 2020-02-06 NOTE — TELEPHONE ENCOUNTER
Pt requesting a refill on following  clonazePAM (KLONOPIN) 0.5 MG tablet       gabapentin (NEURONTIN) 300 MG capsule     Pt does have appt on  10 @9 to see Dr. Lanier Apgar.

## 2020-02-07 RX ORDER — GABAPENTIN 300 MG/1
CAPSULE ORAL
Qty: 60 CAPSULE | Refills: 1 | OUTPATIENT
Start: 2020-02-07 | End: 2020-04-03

## 2020-02-07 NOTE — TELEPHONE ENCOUNTER
Okay to phone in gabapentin. Orders Placed This Encounter   Medications    gabapentin (NEURONTIN) 300 MG capsule     Sig: TAKE TWO CAPSULES BY MOUTH ONCE NIGHTLY     Dispense:  60 capsule     Refill:  1           Controlled Substance Monitoring:    Acute and Chronic Pain Monitoring:   RX Monitoring 2/7/2020   Attestation -   Acute Pain Prescriptions -   Periodic Controlled Substance Monitoring No signs of potential drug abuse or diversion identified.    Chronic Pain > 80 MEDD -

## 2020-02-10 ENCOUNTER — OFFICE VISIT (OUTPATIENT)
Dept: INTERNAL MEDICINE CLINIC | Age: 55
End: 2020-02-10
Payer: COMMERCIAL

## 2020-02-10 VITALS
OXYGEN SATURATION: 98 % | BODY MASS INDEX: 26.5 KG/M2 | HEIGHT: 62 IN | HEART RATE: 76 BPM | DIASTOLIC BLOOD PRESSURE: 70 MMHG | SYSTOLIC BLOOD PRESSURE: 110 MMHG | WEIGHT: 144 LBS

## 2020-02-10 PROCEDURE — 99213 OFFICE O/P EST LOW 20 MIN: CPT | Performed by: INTERNAL MEDICINE

## 2020-02-10 PROCEDURE — 1036F TOBACCO NON-USER: CPT | Performed by: INTERNAL MEDICINE

## 2020-02-10 PROCEDURE — G8427 DOCREV CUR MEDS BY ELIG CLIN: HCPCS | Performed by: INTERNAL MEDICINE

## 2020-02-10 PROCEDURE — G8482 FLU IMMUNIZE ORDER/ADMIN: HCPCS | Performed by: INTERNAL MEDICINE

## 2020-02-10 PROCEDURE — 3017F COLORECTAL CA SCREEN DOC REV: CPT | Performed by: INTERNAL MEDICINE

## 2020-02-10 PROCEDURE — G8417 CALC BMI ABV UP PARAM F/U: HCPCS | Performed by: INTERNAL MEDICINE

## 2020-02-10 ASSESSMENT — ENCOUNTER SYMPTOMS
EYE REDNESS: 0
NAUSEA: 0
CHEST TIGHTNESS: 0
ABDOMINAL PAIN: 0
SHORTNESS OF BREATH: 0
BACK PAIN: 0
COUGH: 0

## 2020-02-10 NOTE — PROGRESS NOTES
Subjective:      Patient ID: Josué Canseco is a 48 y.o. female    Chief Complaint   Patient presents with    Medication Refill       HPI     Sleep disorder  She has a history of interruption insomnia , restless legs, and ETTA. She has not been using her CPAP regularly. She reports variable success sleeping. She has not been to the sleep specialist recently, but would like recheck since she has lost weight since her earlier evaluation. Current Outpatient Medications on File Prior to Visit   Medication Sig Dispense Refill    gabapentin (NEURONTIN) 300 MG capsule TAKE TWO CAPSULES BY MOUTH ONCE NIGHTLY 60 capsule 1    clonazePAM (KLONOPIN) 0.5 MG tablet TAKE ONE TABLET BY MOUTH TWICE A DAY AS NEEDED 60 tablet 1    ibuprofen (ADVIL;MOTRIN) 200 MG tablet Take 200 mg by mouth every 6 hours as needed for Pain      tiZANidine (ZANAFLEX) 2 MG tablet TAKE ONE TABLET BY MOUTH THREE TIMES A DAY AS NEEDED FOR MUSCLE SPASM 30 tablet 0    bismuth subsalicylate (PEPTO-BISMOL) 262 MG chewable tablet Take 2 tablets by mouth      esomeprazole Magnesium (NEXIUM) 20 MG PACK Take 20 mg by mouth      levothyroxine (SYNTHROID) 50 MCG tablet TAKE ONE TABLET BY MOUTH DAILY 30 tablet 11    vitamin D (ERGOCALCIFEROL) 35138 units CAPS capsule TAKE ONE CAPSULE BY MOUTH WEEKLY 4 capsule 11    tetracycline (ACHROMYCIN;SUMYCIN) 500 MG capsule Take 500 mg by mouth      metroNIDAZOLE (FLAGYL) 250 MG tablet Take 250 mg by mouth      pantoprazole (PROTONIX) 20 MG tablet Take 2 tablets by mouth 2 times daily (Patient not taking: Reported on 2/10/2020) 120 tablet 0    promethazine (PHENERGAN) 12.5 MG tablet Take 1 tablet by mouth 3 times daily as needed for Nausea (Patient not taking: Reported on 2/10/2020) 15 tablet 0     No current facility-administered medications on file prior to visit.         Allergies   Allergen Reactions    Morphine Swelling     POSSIBLE DELAYED  SWELLING OF THROAT 12 HOURS LATER  USED BENADRYL TO REVERSE    Lamictal [Lamotrigine] Rash    Naproxen Other (See Comments)     Stomach     Sulfa Antibiotics Swelling     AND RASH       Review of Systems   Constitutional: Negative for fatigue, fever and unexpected weight change. HENT: Negative for congestion and hearing loss. Eyes: Negative for redness and visual disturbance. Respiratory: Negative for cough, chest tightness and shortness of breath. Cardiovascular: Negative for chest pain and leg swelling. Gastrointestinal: Negative for abdominal pain and nausea. Endocrine: Negative for cold intolerance, heat intolerance, polydipsia and polyuria. Genitourinary: Negative for dysuria and frequency. Musculoskeletal: Negative for arthralgias, back pain and neck pain. Skin: Negative for rash and wound. Allergic/Immunologic: Negative for environmental allergies. Neurological: Negative for dizziness, weakness and headaches. Hematological: Negative for adenopathy. Does not bruise/bleed easily. Psychiatric/Behavioral: Positive for sleep disturbance. The patient is not nervous/anxious. Objective:   Physical Exam  Constitutional:       Appearance: Normal appearance. She is well-developed. Cardiovascular:      Rate and Rhythm: Normal rate and regular rhythm. Heart sounds: No murmur. Pulmonary:      Effort: Pulmonary effort is normal.      Breath sounds: Normal breath sounds. Abdominal:      Palpations: Abdomen is soft. Skin:     General: Skin is warm and dry. Findings: No rash. Neurological:      General: No focal deficit present. Mental Status: She is alert and oriented to person, place, and time. Assessment and plan       1. Obstructive sleep apnea syndrome  Stable. She is currently using medication some of the time.   Recheck with sleep specialist.  - Homero Oneill MD, Sleep Medicine, [facility]

## 2020-02-28 ENCOUNTER — OFFICE VISIT (OUTPATIENT)
Dept: ORTHOPEDIC SURGERY | Age: 55
End: 2020-02-28
Payer: COMMERCIAL

## 2020-02-28 VITALS — HEIGHT: 62 IN | RESPIRATION RATE: 17 BRPM | WEIGHT: 144 LBS | BODY MASS INDEX: 26.5 KG/M2

## 2020-02-28 PROCEDURE — 99212 OFFICE O/P EST SF 10 MIN: CPT | Performed by: PHYSICIAN ASSISTANT

## 2020-02-28 PROCEDURE — G8427 DOCREV CUR MEDS BY ELIG CLIN: HCPCS | Performed by: PHYSICIAN ASSISTANT

## 2020-02-28 PROCEDURE — 1036F TOBACCO NON-USER: CPT | Performed by: PHYSICIAN ASSISTANT

## 2020-02-28 PROCEDURE — G8417 CALC BMI ABV UP PARAM F/U: HCPCS | Performed by: PHYSICIAN ASSISTANT

## 2020-02-28 PROCEDURE — G8482 FLU IMMUNIZE ORDER/ADMIN: HCPCS | Performed by: PHYSICIAN ASSISTANT

## 2020-02-28 PROCEDURE — 3017F COLORECTAL CA SCREEN DOC REV: CPT | Performed by: PHYSICIAN ASSISTANT

## 2020-02-28 NOTE — PROGRESS NOTES
moderately reduced with pain. Her skin is warm and dry. She has no tenderness over her cervical spine and no obvious muscle spasm. The skin over her cervical spine is normal without surgical scar. Upper extremities:  She has 5/5 strength of her interosseous muscles, wrist dorsiflexors and volarflexors, biceps, triceps, deltoids, and internal and external rotators of her shoulders, bilaterally. Her biceps, triceps, bracheoradialis, quadriceps and achilles reflexes are 2+, bilaterally. Sensation is intact to light touchfrom C6 to C8. She has no clonus and negative Mederos's bilaterally. Lower extremities:  Her skin is warm and dry. Her gait is antalgic and she walk with her left hip and knee flexed. She stands with slight lumbar flexion. Her lumbar flexion, extension and lateral bending are moderately reduced with pain. She has mild tenderness over her lumbar spine without obvious muscle spasm. The skin over her lumbar spine is normal without a surgical scar. She has 5/5 strength of EHLs, FHLs, foot evertors, ankle dorsiflexors, plantarflexors, quadriceps, hamstrings, iliopsoas, abductors and adductors about the hips bilaterally. She has a negative straight leg raise, bilaterally. Achilles and quadriceps reflexes are 1+. Sensation is intact to light touch L3 to S1 bilaterally. She has no clonus. Hip range of motion painless. Imaging:   I reviewed MRI images of her cervical spine from 10/18/19. They show right paracentral disc protrusion C5-6 and smaller disc protrusions C4-5 and C6-7. AP and lateral xray images of her lumbar spine obtained in the office today show no evidence of fracture or instability. Spondylosis L5-S1. EMG of bilateral LE normal    Assessment:   Cervical HNP  Lumbar DDD    Plan:   We discussed treatment options including observation, epidural injections, physical therapy and surgical intervention. She would like to keep surgical intervention as a last resort.  I recommend seeing

## 2020-03-03 ENCOUNTER — TELEPHONE (OUTPATIENT)
Dept: ORTHOPEDIC SURGERY | Age: 55
End: 2020-03-03

## 2020-03-03 NOTE — TELEPHONE ENCOUNTER
Patient was referred to Dr. Carlos Mart for neck  MRI yes 10/2019 in media  PT yes   MEDICAL HX negative   Patient is approved. to be seen at this time. Message sent to schedulers   Please notify referring physician if denied.

## 2020-04-03 RX ORDER — GABAPENTIN 300 MG/1
CAPSULE ORAL
Qty: 60 CAPSULE | Refills: 0 | Status: SHIPPED | OUTPATIENT
Start: 2020-04-03 | End: 2020-05-01

## 2020-04-03 RX ORDER — CLONAZEPAM 0.5 MG/1
TABLET ORAL
Qty: 60 TABLET | Refills: 0 | Status: SHIPPED | OUTPATIENT
Start: 2020-04-03 | End: 2020-05-01

## 2020-05-04 RX ORDER — CLONAZEPAM 0.5 MG/1
TABLET ORAL
Qty: 60 TABLET | Refills: 0 | Status: SHIPPED | OUTPATIENT
Start: 2020-05-09 | End: 2020-06-01

## 2020-05-04 RX ORDER — GABAPENTIN 300 MG/1
CAPSULE ORAL
Qty: 60 CAPSULE | Refills: 0 | Status: SHIPPED | OUTPATIENT
Start: 2020-05-04 | End: 2020-06-01

## 2020-05-04 NOTE — TELEPHONE ENCOUNTER
Her scripts are not due to be refilled until 5/10/2020. I renewed them today but they will not be available for refill until Saturday or Sunday.

## 2020-05-08 ENCOUNTER — OFFICE VISIT (OUTPATIENT)
Dept: INTERNAL MEDICINE CLINIC | Age: 55
End: 2020-05-08
Payer: COMMERCIAL

## 2020-05-08 VITALS
OXYGEN SATURATION: 98 % | HEIGHT: 61 IN | WEIGHT: 142.2 LBS | TEMPERATURE: 97.6 F | SYSTOLIC BLOOD PRESSURE: 120 MMHG | BODY MASS INDEX: 26.85 KG/M2 | HEART RATE: 58 BPM | DIASTOLIC BLOOD PRESSURE: 80 MMHG

## 2020-05-08 PROCEDURE — G8427 DOCREV CUR MEDS BY ELIG CLIN: HCPCS | Performed by: INTERNAL MEDICINE

## 2020-05-08 PROCEDURE — 1036F TOBACCO NON-USER: CPT | Performed by: INTERNAL MEDICINE

## 2020-05-08 PROCEDURE — 3017F COLORECTAL CA SCREEN DOC REV: CPT | Performed by: INTERNAL MEDICINE

## 2020-05-08 PROCEDURE — 99213 OFFICE O/P EST LOW 20 MIN: CPT | Performed by: INTERNAL MEDICINE

## 2020-05-08 PROCEDURE — G8417 CALC BMI ABV UP PARAM F/U: HCPCS | Performed by: INTERNAL MEDICINE

## 2020-05-08 ASSESSMENT — ENCOUNTER SYMPTOMS
EYE REDNESS: 0
SHORTNESS OF BREATH: 0
CHEST TIGHTNESS: 0
ABDOMINAL PAIN: 0
COUGH: 0
BACK PAIN: 0
NAUSEA: 0

## 2020-05-29 ENCOUNTER — HOSPITAL ENCOUNTER (OUTPATIENT)
Dept: PHYSICAL THERAPY | Age: 55
Setting detail: THERAPIES SERIES
Discharge: HOME | End: 2020-05-29
Payer: COMMERCIAL

## 2020-05-29 PROCEDURE — 97162 PT EVAL MOD COMPLEX 30 MIN: CPT

## 2020-05-29 PROCEDURE — 97530 THERAPEUTIC ACTIVITIES: CPT

## 2020-05-29 PROCEDURE — 97110 THERAPEUTIC EXERCISES: CPT
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
## 2020-06-02 ENCOUNTER — TELEPHONE (OUTPATIENT)
Dept: ORTHOPEDIC SURGERY | Age: 55
End: 2020-06-02

## 2020-06-02 RX ORDER — GABAPENTIN 300 MG/1
CAPSULE ORAL
Qty: 60 CAPSULE | Refills: 1 | Status: SHIPPED | OUTPATIENT
Start: 2020-06-02 | End: 2021-04-14

## 2020-06-02 RX ORDER — CLONAZEPAM 0.5 MG/1
TABLET ORAL
Qty: 60 TABLET | Refills: 1 | Status: SHIPPED | OUTPATIENT
Start: 2020-06-02 | End: 2022-02-17 | Stop reason: SDUPTHER

## 2020-06-03 ENCOUNTER — TELEPHONE (OUTPATIENT)
Dept: ENDOCRINOLOGY | Age: 55
End: 2020-06-03

## 2020-06-08 ENCOUNTER — HOSPITAL ENCOUNTER (OUTPATIENT)
Dept: PHYSICAL THERAPY | Age: 55
Setting detail: THERAPIES SERIES
Discharge: HOME | End: 2020-06-08
Payer: COMMERCIAL

## 2020-06-08 DIAGNOSIS — E03.9 ACQUIRED HYPOTHYROIDISM: ICD-10-CM

## 2020-06-08 DIAGNOSIS — E55.9 VITAMIN D INSUFFICIENCY: ICD-10-CM

## 2020-06-08 LAB
GLUCOSE BLD-MCNC: 88 MG/DL (ref 70–99)
TSH REFLEX: 1.45 UIU/ML (ref 0.27–4.2)
VITAMIN D 25-HYDROXY: 64.9 NG/ML

## 2020-06-08 PROCEDURE — 97110 THERAPEUTIC EXERCISES: CPT

## 2020-06-08 NOTE — FLOWSHEET NOTE
support         L hip abd 10x  sidelying                Therapeutic Activities (67224):      Home Exercise Program:     Access Code: [facility]   URL: [email or web address]   Date: 05/29/2020   Prepared by: Ambika Patel   Exercises   · Supine Piriformis Stretch - 3 reps - 3 sets - 30 hold - 1x daily - 7x weekly   · Hooklying Hamstring Stretch with Strap - 3 reps - 3 sets - 30 hold - 1x daily - 7x weekly   · Supine Transversus Abdominis Bracing - Hands on Stomach - 10 reps - 3 sets - 3-5 hold - 1x daily - 4x weekly   · Hooklying Sequential Leg March and Lower - 10 reps - 2 sets - 3-5 hold - 1x daily - 4x weekly   · Standing Heel Raise - 10 reps - 1 sets - 1x daily - 4x weekly   Access Code: [email or web address]   URL: [email or web address]   Date: 06/08/2020   Prepared by: Ambika Patel   Exercises   Sidelying Hip Abduction - 10 reps - 3 sets - 1x daily - 4x weekly     Manual Treatments (47952): manual long axis traction R attempted, however pt reported numbness beginning in foot upon lifting LE without traction applied. Treatment discontinued. Modalities:      Timed Code Treatment Minutes:  HI 19; MT 1    Total Treatment Minutes:  54    Treatment/Activity Tolerance:  [x] Patient tolerated treatment well [] Patient limited by fatigue  [] Patient limited by pain  [] Patient limited by other medical complications  [] Other:     Assessment: The pt had good tolerance of all there ex today for core and LE. She was limited in quadriped by B wrist fatigue/pain. Trialed briefly long axis traction on R LE to address numbness in lower leg, however pt reports numbness worsened in R foot when LE elevated prior to applying traction. May consider general lumbar traction at later date if no improvement w/ core and LE strengthening. The pt will benefit from a short course of PT to address core strengthening, LE weakness and pain management strategies as able in order for pt to return to previous level of function.

## 2020-06-09 LAB
ESTIMATED AVERAGE GLUCOSE: 111.2 MG/DL
HBA1C MFR BLD: 5.5 %

## 2020-06-10 ENCOUNTER — TELEPHONE (OUTPATIENT)
Dept: ORTHOPEDIC SURGERY | Age: 55
End: 2020-06-10

## 2020-06-10 NOTE — TELEPHONE ENCOUNTER
Okay thanks.  I recommend follow up for her cervical spine with Dr. Sue Hahn in which she was given referral. If she is still having low back issues we can order MRI of her lumbar spine

## 2020-06-10 NOTE — TELEPHONE ENCOUNTER
Spoke with Heather (PT) she has a concern for patient. When she evaluated her about 1-2 weeks ago, patient had clonus in her feet. She said it was just like 1 beat of clonus. She wanted us to be aware since when you evaluated her in Feb she did not have clonus. This was the only thing that changed on her evaluation. Please advise and let me know what our next steps would be if there are any.

## 2020-06-23 ENCOUNTER — HOSPITAL ENCOUNTER (OUTPATIENT)
Dept: PHYSICAL THERAPY | Age: 55
Setting detail: THERAPIES SERIES
Discharge: HOME | End: 2020-06-23
Payer: COMMERCIAL

## 2020-06-23 PROCEDURE — 97110 THERAPEUTIC EXERCISES: CPT

## 2020-06-23 NOTE — FLOWSHEET NOTE
forward) 4 (trunk gives way forward)   Hip extension    4+ 4+   Hip abduction   4+ 4+   Knee extension       Knee flexion       Ankle DF   3 (9 reps) 3 (10 reps)   Ankle PF       Ankle eversion       Ankle inversion             Exercises, Neuro Facilitation & Gait Training (60561, [phone], [id number]): Activity Resistance/Repetitions Other comments   Nustep 8 min. , L3,  B UE/LE, seat 5, arms 9. Transitioned to LE only due to hand pain. Piriformis stretch supine  60 sec x 2 each  R tighter than L        TA + marching 5x each LE, 3 sec hold       B heel raises 10x B UE support    Single heel raises 5x each B UE support    Hip ext 10x 1 Prone, easily fatigued    R/L hip abd 10x 2 Sidelying, easily fatigued when alignment corrected    Partial squats next    bridging 4 x 3, 3 sec hold  Foot numbness worsened, reduced when transitioned to supine      Therapeutic Activities (57635):      Home Exercise Program:     Access Code: [facility]   URL: [email or web address]   Date: 05/29/2020   Prepared by: Darrall Gelineau   Exercises   · Supine Piriformis Stretch - 3 reps - 3 sets - 30 hold - 1x daily - 7x weekly   · Hooklying Hamstring Stretch with Strap - 3 reps - 3 sets - 30 hold - 1x daily - 7x weekly   · Supine Transversus Abdominis Bracing - Hands on Stomach - 10 reps - 3 sets - 3-5 hold - 1x daily - 4x weekly   · Hooklying Sequential Leg March and Lower - 10 reps - 2 sets - 3-5 hold - 1x daily - 4x weekly   · Standing Heel Raise - 10 reps - 1 sets - 1x daily - 4x weekly   Access Code: [email or web address]   URL: [email or web address]   Date: 06/08/2020   Prepared by: Darrall Gelineau   Exercises   Sidelying Hip Abduction - 10 reps - 3 sets - 1x daily - 4x weekly     6/23/20: reprinted all HEP      Manual Treatments (85326):     Modalities:      Timed Code Treatment Minutes:  CR 81    Total Treatment Minutes:  56    Treatment/Activity Tolerance:  [x] Patient tolerated treatment well [] Patient limited by fatigue  [] Patient limited by pain  [] Patient limited by other medical complications  [] Other:     Assessment: Pt fatigued by most exercises, but able to perform next exercise or set w/ usual rest time. Encouraged pt to perform current HEP more consistently in order to determine if PT is helping or not, should see gradual improvements in muscle strength in 2-4 weeks. Discussed POC and limited number of insurance approved visits, recommended pt schedule accordingly. The pt will benefit from PT to address core strengthening, LE weakness and pain management strategies as able in order for pt to return to previous level of function. Prognosis: [] Good [x] Fair  [] Poor    Patient Requires Follow-up: [x] Yes  [] No    Goals:  Short term goals  Time Frame for Short term goals: 2 weeks   Short term goal 1: Pt will demonstrate ability to perform HEP following instruction.- Meeting    Long term goals  Time Frame for Long term goals : 4 weeks   Long term goal 1: Pt will improve B hip flexor strength to 4/5, L hip abd to 5/5 and B gastroc to 4/5 in order to return to regular long distance ambulation. Not met, inconsistent strength numbers, appears to be either same or worse in B LE compared to eval   Long term goal 2: Pt will report ability to walk for exercises > 1 mile consistently. Not met, continues w/ inconsistent tolerance. Long term goal 3: Pt will improve Modified Oswestry to >= 44%.      Plan:   Visits per week: 1-2   # of weeks:  4    [x] Continue per plan of care [] Alter current plan (see comments)  [] Plan of care initiated [] Hold pending MD visit [] Discharge    Plan for Next Session:  Progress Note  Progress core strengthening, add general LE strengthening for increased muscle mass     Electronically signed by:  Josiah Tsang DPT

## 2020-06-29 ENCOUNTER — HOSPITAL ENCOUNTER (OUTPATIENT)
Dept: PHYSICAL THERAPY | Age: 55
Setting detail: THERAPIES SERIES
Discharge: HOME | End: 2020-06-29
Payer: COMMERCIAL

## 2020-06-29 PROCEDURE — 97110 THERAPEUTIC EXERCISES: CPT

## 2020-06-29 NOTE — PROGRESS NOTES
R L R L    Hip flexion   4 (trunk gives way forward) 4 (trunk gives way forward)   Hip extension    5 5   Hip abduction   4+ 4+   Knee extension       Knee flexion       Ankle DF   4 (9 reps) 4 (10 reps)   Ankle PF              Hamstring flexibility (90-90) -25 -18          Progress towards goals:    Pt met 1/1 ST goals. Unable to determine if pt making progress towards LT goals due to inconsistent performance on MMT and pt reporting she is not aware of any strength or functional improvements. Pt continues to consisently report no pain, but fatigue in B lower legs and N/T in B feet intermittently. Pt demonstrates fluctuations in strength and tolerance of activity (pt attributes to fibromyalgia). She also acknowledges that this past week is the first time she has put in real and consistent effort on her HEP. Therefor with increased time spent on specific strengthening, expect improvements at next progress report/D/C. The pt will benefit from PT to address core strengthening, LE weakness and pain management strategies as able in order for pt to return to previous level of function. Frequency/Duration:  # Days per week: [x] 1 day # Weeks: [] 1 week [x] 4 weeks      [x] 2 days? [] 2 weeks [] 5 weeks      [] 3 days   [] 3 weeks [] 6 weeks     Rehab Potential: [] Excellent [x] Good [x] Fair  [] Poor     Goal Status:  [] Achieved [x] Partially Achieved  [] Not Achieved     Patient Status: [x] Continue per initial plan of Care     [] Patient now discharged     [] Additional visits requested, Please re-certify for additional visits:      Requested frequency/duration:  X/week for weeks    Electronically signed by:  Alice Mercer PT, DPT    If you have any questions or concerns, please don't hesitate to call.   Thank you for your referral.    Physician Signature:________________________________Date:__________________  By signing above, therapists plan is approved by physician

## 2020-06-29 NOTE — FLOWSHEET NOTE
Physical Therapy Daily Treatment Note  Date:  2020    Patient Name:  Sheldon Hightower    :  1965  MRN: [id number]     Restrictions/Precautions:  H/o rectal cancer, fibromyalgia      Medical/Treatment Diagnosis Information:  · Diagnosis: M51.36 (ICD-10-CM) - DDD (degenerative disc disease), lumbar; R20.2 (ICD-10-CM) - Paresthesia of both lower extremities  ·   B LE fatigue and B feet numbness    Insurance/Certification information:  PT Insurance Information: [employer] (30 visits per year; has used approximately 21 to date)  Physician Information:  Referring Practitioner: Rita Duenas PA-C MD Follow-up Visit: ?  Plan of care signed (Y/N):  Y  Visit# / total visits:   Pain level: 0/10 low back; B lower leg fatigue and feet numbness    Progress Note Due (10 visits or 30 days, whichever is less):    Recertification Note Due (End of POC or 90 days, whichever is less):      Subjective:  Pt reports feeling about the same, working hard on exercises. Overall, feels about the same. Trying to do other exer, core, lunges, squats. Pt does report feeling looser in her hamstrings. Objective:   Observation: no shooting pain today, minimally antalgic gait- R LE slight.  Test measurements:       ROM  MMT     R L R L    Hip flexion   4 (trunk gives way forward) 4 (trunk gives way forward)   Hip extension    5 5   Hip abduction   4+ 4+   Knee extension       Knee flexion       Ankle DF   4 (9 reps) 4 (10 reps)   Ankle PF              Hamstring flexibility (90-90) -25 -18           Exercises, Neuro Facilitation & Gait Training (57368, [id number], [id number]): Activity Resistance/Repetitions Other comments   Nustep 8 min. , L3,  B UE/LE, seat 6, arms 9. No wrist/hand pain today.          Hamstring stretch supine w/ belt 30 sec x 3 each    Piriformis stretch supine  60 sec x 2 each  R tighter than L        TA 5 x 3 sec VC to avoid pelvic tilt   TA + marching 5x each LE, 3 sec hold    TA + small SLR R/L 10x each LE, tolerance of activity (pt attributes to fibromyalgia). She also acknowledges that this past week is the first time she has put in real and consistent effort on her HEP. Therefor with increased time spent on specific strengthening, expect improvements at next progress report/D/C. The pt will benefit from PT to address core strengthening, LE weakness and pain management strategies as able in order for pt to return to previous level of function. Prognosis: [] Good [x] Fair  [] Poor    Patient Requires Follow-up: [x] Yes  [] No    Goals:  Short term goals  Time Frame for Short term goals: 2 weeks   Short term goal 1: Pt will demonstrate ability to perform HEP following instruction.- Meeting    Long term goals  Time Frame for Long term goals : 4 weeks   Long term goal 1: Pt will improve B hip flexor strength to 4/5, L hip abd to 5/5 and B gastroc to 4/5 in order to return to regular long distance ambulation. Not met, inconsistent strength numbers, appears to be either same or worse in B LE compared to eval   Long term goal 2: Pt will report ability to walk for exercises > 1 mile consistently. Not met, continues w/ inconsistent tolerance. Long term goal 3: Pt will improve Modified Oswestry to >= 44%.      Plan:   Visits per week: 1-2   # of weeks:  4    [x] Continue per plan of care [] Alter current plan (see comments)  [] Plan of care initiated [] Hold pending MD visit [] Discharge    Plan for Next Session:    Progress core strengthening, add general LE strengthening for increased muscle mass     Electronically signed by:  Kaur Kelly DPT

## 2020-06-30 ENCOUNTER — OFFICE VISIT (OUTPATIENT)
Dept: GYNECOLOGY | Age: 55
End: 2020-06-30
Payer: COMMERCIAL

## 2020-06-30 VITALS
WEIGHT: 143.2 LBS | DIASTOLIC BLOOD PRESSURE: 72 MMHG | TEMPERATURE: 97.4 F | SYSTOLIC BLOOD PRESSURE: 100 MMHG | BODY MASS INDEX: 27.03 KG/M2 | RESPIRATION RATE: 17 BRPM | HEART RATE: 65 BPM | HEIGHT: 61 IN

## 2020-06-30 PROCEDURE — 99396 PREV VISIT EST AGE 40-64: CPT | Performed by: OBSTETRICS & GYNECOLOGY

## 2020-06-30 ASSESSMENT — ENCOUNTER SYMPTOMS
RESPIRATORY NEGATIVE: 1
EYES NEGATIVE: 1
GASTROINTESTINAL NEGATIVE: 1

## 2020-06-30 NOTE — PROGRESS NOTES
Subjective:      Patient ID: Dahlia Sam is a 48 y.o. female.     Gynecologic Exam         Review of Systems    Objective:   Physical Exam    Assessment:      ***      Plan:      ***        Becca Onofre MD
Other Topics Concern    Not on file   Social History Narrative    Not on file     Allergies   Allergen Reactions    Morphine Swelling     POSSIBLE DELAYED  SWELLING OF THROAT 12 HOURS LATER  USED BENADRYL TO REVERSE    Lamictal [Lamotrigine] Rash    Naproxen Other (See Comments)     Stomach     Sulfa Antibiotics Swelling     AND RASH     Outpatient Medications Marked as Taking for the 6/30/20 encounter (Office Visit) with Clemente Stearns MD   Medication Sig Dispense Refill    clonazePAM (KLONOPIN) 0.5 MG tablet TAKE ONE TABLET BY MOUTH TWICE A DAY AS NEEDED 60 tablet 1    gabapentin (NEURONTIN) 300 MG capsule TAKE TWO CAPSULES BY MOUTH ONCE NIGHTLY 60 capsule 1    vitamin D (ERGOCALCIFEROL) 1.25 MG (98640 UT) CAPS capsule TAKE ONE CAPSULE BY MOUTH ONCE WEEKLY 4 capsule 0    levothyroxine (SYNTHROID) 50 MCG tablet TAKE ONE TABLET BY MOUTH DAILY 30 tablet 0    ibuprofen (ADVIL;MOTRIN) 200 MG tablet Take 200 mg by mouth every 6 hours as needed for Pain      bismuth subsalicylate (PEPTO-BISMOL) 262 MG chewable tablet Take 2 tablets by mouth       Family History   Problem Relation Age of Onset    High Cholesterol Mother     High Blood Pressure Father     Heart Disease Father     COPD Father     Stroke Maternal Grandfather     Thyroid Disease Sister         Hypothyroidism     /72 (Site: Right Upper Arm, Position: Sitting, Cuff Size: Large Adult)   Pulse 65   Temp 97.4 °F (36.3 °C)   Resp 17   Ht 5' 1\" (1.549 m)   Wt 143 lb 3.2 oz (65 kg)   BMI 27.06 kg/m²       Objective:   Physical Exam  Constitutional:       General: She is not in acute distress. Appearance: Normal appearance. She is well-developed and normal weight. She is not diaphoretic. HENT:      Head: Normocephalic and atraumatic. Nose: Nose normal.      Mouth/Throat:      Mouth: Mucous membranes are moist.      Pharynx: Oropharynx is clear. Eyes:      Pupils: Pupils are equal, round, and reactive to light.    Neck:

## 2020-07-01 ENCOUNTER — HOSPITAL ENCOUNTER (OUTPATIENT)
Dept: PHYSICAL THERAPY | Age: 55
Setting detail: THERAPIES SERIES
Discharge: HOME | End: 2020-07-01
Payer: COMMERCIAL

## 2020-07-01 PROCEDURE — 97110 THERAPEUTIC EXERCISES: CPT

## 2020-07-01 NOTE — FLOWSHEET NOTE
Physical Therapy Daily Treatment Note  Date:  2020    Patient Name:  Wendy Cantu    :  1965  MRN: [id number]     Restrictions/Precautions:  H/o rectal cancer, fibromyalgia      Medical/Treatment Diagnosis Information:  · Diagnosis: M51.36 (ICD-10-CM) - DDD (degenerative disc disease), lumbar; R20.2 (ICD-10-CM) - Paresthesia of both lower extremities  ·   B LE fatigue and B feet numbness    Insurance/Certification information:  PT Insurance Information: [facility] (30 visits per year; has used approximately 21 to date)  Physician Information:  Referring Practitioner: Oliver Schilder, PA-C MD Follow-up Visit: ?  Plan of care signed (Y/N):  Y  Visit# / total visits:   Pain level: 0/10 denies low back pain; c/o B knee fatigue     Progress Note Due (10 visits or 30 days, whichever is less): 57  Recertification Note Due (End of POC or 90 days, whichever is less):      Subjective:  Pt reports feeling tired/fatigued today. Not sleeping well. Pt reports (good) soreness in lower abdominals from HEP, only walked, 2.5 miles, for exercise yesterday. Objective:   Observation: minimally antalgic gait- R LE slight, slow forest entering clinic. Improved forest exiting clinic.  Test measurements:      Exercises, Neuro Facilitation & Gait Training (31231, .70.26.99): Activity Resistance/Repetitions Other comments   Nustep 8 min. , L3,  B UE/LE, seat 6, arms 9. No wrist/hand pain today, but c/o \"fatigue in knees\", not any areas of leg muscle, only in joints.          Hamstring stretch supine w/ belt 30 sec x 3 each    Piriformis stretch supine  60 sec x 2 each  R tighter than L        TA 10 x 3 sec VC to avoid pelvic tilt   TA + marching 10x each LE, 3 sec hold    TA + small SLR R/L 10x each LE, 3 sec hold    Cat-camel quadriped 15x + neutral spine abd stabilization 10 sec x 2 No modifications today due to no c/o wrist pain    Single heel raises 5x each B UE support    R/L hip abd 10x 2 Sidelying, not reassessed response to lumbar traction since first attempt that worsened numbness in R foot. Encouraged pt to be consistent w/ HEP next week while taking break from PT (primary PT out of clinic and) she will benefit from attempting to maintain benefits w/ HEP alone prior to D/C). The pt will benefit from PT to address core strengthening, LE weakness and pain management strategies as able in order for pt to return to previous level of function. Prognosis: [] Good [x] Fair  [] Poor    Patient Requires Follow-up: [x] Yes  [] No    Goals:  Short term goals  Time Frame for Short term goals: 2 weeks   Short term goal 1: Pt will demonstrate ability to perform HEP following instruction.- Meeting    Long term goals  Time Frame for Long term goals : 4 weeks   Long term goal 1: Pt will improve B hip flexor strength to 4/5, L hip abd to 5/5 and B gastroc to 4/5 in order to return to regular long distance ambulation. Not met, inconsistent strength numbers, appears to be either same or worse in B LE compared to eval   Long term goal 2: Pt will report ability to walk for exercises > 1 mile consistently. Not met, continues w/ inconsistent tolerance. Long term goal 3: Pt will improve Modified Oswestry to >= 44%.      Plan:   Visits per week: 1-2   # of weeks:  4    [x] Continue per plan of care [] Alter current plan (see comments)  [] Plan of care initiated [] Hold pending MD visit [] Discharge    Plan for Next Session:    Progress core strengthening, add general LE strengthening for increased muscle mass     Electronically signed by:  Catrachita Thurman DPT

## 2020-07-06 RX ORDER — ERGOCALCIFEROL 1.25 MG/1
CAPSULE ORAL
Qty: 4 CAPSULE | Refills: 0 | Status: SHIPPED | OUTPATIENT
Start: 2020-07-06 | End: 2020-07-07 | Stop reason: SDUPTHER

## 2020-07-06 RX ORDER — LEVOTHYROXINE SODIUM 0.05 MG/1
TABLET ORAL
Qty: 30 TABLET | Refills: 0 | Status: SHIPPED | OUTPATIENT
Start: 2020-07-06 | End: 2020-07-07 | Stop reason: SDUPTHER

## 2020-07-07 ENCOUNTER — VIRTUAL VISIT (OUTPATIENT)
Dept: ENDOCRINOLOGY | Age: 55
End: 2020-07-07
Payer: COMMERCIAL

## 2020-07-07 PROCEDURE — 3017F COLORECTAL CA SCREEN DOC REV: CPT | Performed by: INTERNAL MEDICINE

## 2020-07-07 PROCEDURE — G8427 DOCREV CUR MEDS BY ELIG CLIN: HCPCS | Performed by: INTERNAL MEDICINE

## 2020-07-07 PROCEDURE — 99213 OFFICE O/P EST LOW 20 MIN: CPT | Performed by: INTERNAL MEDICINE

## 2020-07-07 RX ORDER — LEVOTHYROXINE SODIUM 0.05 MG/1
TABLET ORAL
Qty: 30 TABLET | Refills: 5 | Status: SHIPPED | OUTPATIENT
Start: 2020-07-07 | End: 2020-08-03

## 2020-07-07 RX ORDER — ERGOCALCIFEROL 1.25 MG/1
CAPSULE ORAL
Qty: 2 CAPSULE | Refills: 5 | Status: SHIPPED | OUTPATIENT
Start: 2020-07-07 | End: 2020-07-31

## 2020-07-07 NOTE — PROGRESS NOTES
Subjective:      48  y.o WF who is here for thyroid evaluation. Pursuant to the emergency declaration under the [facility], [facility] waiver authority and the [employer] Act, this Virtual  Visit was conducted, with patient's consent, to reduce the patient's risk of exposure to COVID-19 and provide continuity of care for an established patient. Patient was at home. Provider was at home. No one else was involved  Services were provided through a video synchronous discussion virtually to substitute for in-person clinic visit. Interim:     Stable  No issues    Initial complaints: Fatigue improved ,  sleep problems,    muscle and joint pain,  Anxiety improved,  confusion,cold intolerance, brain fog  History of obstructive symptoms:  difficulty swallowing No, changes in voice/hoarseness  No.    History of radiation to patient's neck:   No  Recent iodine exposure:  No  Family history includes mom and sister hypothyroidism. Family history of thyroid cancer:  No    Current smoking of cigarettes or cigars: No    Hypothyroidism for year    Mild controlled    TFT:  10/15 TSH 2.72 Vit D 23.8   6/15 TSH 2.18  Vitamin D 27  4/15 TSH 2.04 TPO 8 Vit D 12.4 start on vitamin D Replacement also taking D3 1000daily  11/14 TSH 2.42 increased to 50mcg  7/14  TSH 3.22 FT4 1.1  11/13 TSH 2.04 Free level normal  6/13  TSH 6.8  FT4  0.9 LTX 25mcg  6/13  TSH 3.75 FT4 0.7  3/13  TSH 3.5 FT4 0.85  3/13  TSH 6.03 FT4 0.77  9/11  TSH 3.53    She has thyroid nodules    Thyroid ultrasound 6/13     The right thyroid lobe is 3.5 x 1.3 x 1.3 cm in size. Left is 3.3   x 1.4 x 1.0 cm in size. Isthmus is 5 mm in thickness. Thyroid gland is mildly inhomogeneous. Two hypoechoic nodules are   demonstrated within the mid to upper right lobe, 9 x 6 mm and 8 x   6 mm. IMPRESSION:   2 nonspecific right thyroid hypoechoic nodules.  Further   evaluation versus continued short-term followup would be   suggested.  Thyroid Ultrasound:  Right thyroid lobe measures 1.5 x 1.2 x 3.8 cm   Left lobe measures 1.3 x 0.9 x 3.4 cm. Two subtle nodules in the right thyroid lobe measure 9 x 6 x 5 mm   and 5 x 5 x 4 mm, without significant interval change. No   discrete nodule is present within the left lobe or within the   isthmus. Small benign-appearing lymph node is located inferior to   the left lobe with central fatty hilum and normal cortical   thickness, measuring under 2 mm. Stable     Stable nodules 6 and 5mm    She had worsening symptoms after being off SSRI. She is taking klonipin currently. Started on levothyroxine 25mcg initially ,   Cortisol 8.7     TSH 2.38  Vit D 41  Levothyroxine 50mcg    On Vit D 50,000IU twice a week   TSH 3.17   Vit D 52 on    50,000 IU weekly   TSH 1.74 Vit D 55.5   Vit D 64 TSH  1.45 A1c 5.5    Past Medical History:   Diagnosis Date    Back pain     Chicken pox     Depression     NO MEDS NOW OKAY    Fibromyalgia     Gastric ulcer, unspecified as acute or chronic, without mention of hemorrhage, perforation, or obstruction     Gene mutation     chek 22-needs breast MRI    GERD (gastroesophageal reflux disease)     Insomnia 1992    Sleep study 1992:dxed w/interuption onset insomnia    Marijuana use     no further controlled substances while using marijuana.  Mixed hyperlipidemia 10/17/2011    SLIGHT ELEVATION NO MEDS    Mononucleosis     Panic attacks     Pap smear 2010;10/17/11;10/2012    Nml. Dr. Maddox(prior gyn)    Rectal cancer [facility]) 2017    Screening mammogram 2010;2011;2013    Nml.      Sleep apnea     DOESN'T KNOW SETTINGS    Subclinical hypothyroidism 2013    Thyroid nodules:Right 2013    Wheezing 2013     Past Surgical History:   Procedure Laterality Date     SECTION  2003    CHOLECYSTECTOMY N/A 10/13/2015   Donya Lowell DENTAL SURGERY  ENDOSCOPY, COLON, DIAGNOSTIC      LEFT COLECTOMY  12/11/2017    Evette Wheeler MD, rectal cancer     OTHER SURGICAL HISTORY  10/13/15    LAPAROSCOPIC CHOLECYSTECTOMY    PILONIDAL CYST EXCISION      TONSILLECTOMY      UPPER GASTROINTESTINAL ENDOSCOPY N/A 6/14/2019    EGD BIOPSY performed by Lincoln Andrew MD at [facility] ENDOSCOPY     Current Outpatient Medications   Medication Sig Dispense Refill    vitamin D (ERGOCALCIFEROL) 1.25 MG (85525 UT) CAPS capsule TAKE ONE CAPSULE BY MOUTH ONCE WEEKLY 4 capsule 0    levothyroxine (SYNTHROID) 50 MCG tablet TAKE ONE TABLET BY MOUTH DAILY 30 tablet 0    clonazePAM (KLONOPIN) 0.5 MG tablet TAKE ONE TABLET BY MOUTH TWICE A DAY AS NEEDED 60 tablet 1    gabapentin (NEURONTIN) 300 MG capsule TAKE TWO CAPSULES BY MOUTH ONCE NIGHTLY 60 capsule 1    ibuprofen (ADVIL;MOTRIN) 200 MG tablet Take 200 mg by mouth every 6 hours as needed for Pain      tiZANidine (ZANAFLEX) 2 MG tablet TAKE ONE TABLET BY MOUTH THREE TIMES A DAY AS NEEDED FOR MUSCLE SPASM 30 tablet 0    bismuth subsalicylate (PEPTO-BISMOL) 262 MG chewable tablet Take 2 tablets by mouth      esomeprazole Magnesium (NEXIUM) 20 MG PACK Take 20 mg by mouth      tetracycline (ACHROMYCIN;SUMYCIN) 500 MG capsule Take 500 mg by mouth      metroNIDAZOLE (FLAGYL) 250 MG tablet Take 250 mg by mouth      promethazine (PHENERGAN) 12.5 MG tablet Take 1 tablet by mouth 3 times daily as needed for Nausea 15 tablet 0     No current facility-administered medications for this visit. Review of Systems  Constitutional: Negative for weight loss and malaise/fatigue. Negative for fever and chills. HENT: Negative for hearing loss, ear pain, nosebleeds, neck pain and tinnitus. Eyes: Negative for blurred vision. Negative for double vision, photophobia and pain. Respiratory: Negative for cough and sputum production. Cardiovascular: Negative for chest pain, palpitations and leg swelling.    Gastrointestinal: Negative for nausea, vomiting and abdominal pain. Genitourinary: Negative for dysuria, urgency and frequency. +Incontinence  Musculoskeletal: + for back pain. No joint pain  Skin: Negative for itching and rash. Neurological: Negative for dizziness. Negative for tingling, tremors, focal weakness and headaches. Endo/Heme/Allergies: see HPI  Psychiatric/Behavioral: Negative for depression and substance abuse. PHYSICAL EXAMINATION:  [ INSTRUCTIONS:  \"[x]\" Indicates a positive item  \"[]\" Indicates a negative item  -- DELETE ALL ITEMS NOT EXAMINED]  Vital Signs: (As obtained by patient/caregiver or practitioner observation)    Blood pressure-  Heart rate-    Respiratory rate-    Temperature-  Pulse oximetry-     Constitutional Appears well-developed and well-nourished No apparent distress        Mental status  Alert and awake  Oriented to person/place/time  Able to follow commands      Eyes:  EOM    [x]  Normal    Sclera  [x]  Normal           Discharge [x]  None visible      HENT:   [x] Normocephalic, atraumatic.     [x] Mouth/Throat: Mucous membranes are moist.     External Ears [x] Normal  no discharge    Neck: [x] No visualized mass  no swelling    Pulmonary/Chest: [x] Respiratory effort normal.  [x] No visualized signs of difficulty breathing or respiratory distress             Musculoskeletal:   [x] Normal gait with no signs of ataxia         [x] Normal range of motion of neck          Head and neck stable, appears normal ROM, strength good    Neurological:        [x] No Facial Asymmetry (Cranial nerve 7 motor function) (limited exam to video visit)          [x] No gaze palsy                Skin:        [x] No significant exanthematous lesions or discoloration noted on facial skin                 Psychiatric:       [x] Normal Affect [x] No Hallucinations            Other pertinent observable physical exam findings-     Due to this being a TeleHealth encounter, evaluation of the following organ systems is limited: Vitals/Constitutional/EENT/Resp/CV/GI//MS/Neuro/Skin/Heme-Lymph-Imm. Services were provided through a video synchronous discussion virtually to substitute for in-person clinic visit. Lab Review  Lab Results   Component Value Date    TSH 3.12 02/22/2018     No results found for: FREET4      Assessment: 1. Subclinical Hypothyroidism:                         She had a high TSH with low normal FT4 indicative of subclinical hypothyroidism. TPO antibodies are normal, but reviewed images, heterogenous gland indicative of Hashimotos. I discussed with her that with her levels, I do no think her anxiety is due to her thyroid, given her fatigue, weakness, cold intolerance,  started on levothyroxine, now biochemically euthyroid. She had question about T3, told her level was normal.Advised symptoms due to stress/depression    2. Thyroid Nodules: Reviewed images, two right sided sub centimeter nodules, ill defined, pseudo nodules? Recommend to monitor. USG 5/19 shows stable 5mm and 6mm nodule    3. Anxiety/Depression: On klonipin as needed, does not want anti depressant, follow PCP, stress is better    4. Vitamin D deficiency: On replacement, taking weekly, level improved , continue, change dose to every 2 weeks    Plan: 1. Continue levothyroxine,  50mcg one tab daily,  vitamin D every 2 weeks  2. Check TFT and vitamin D before next visit  3. Thyroid Nodule f/u clinically [facility] 2021  4. F/u in one year

## 2020-07-13 ENCOUNTER — HOSPITAL ENCOUNTER (OUTPATIENT)
Dept: PHYSICAL THERAPY | Age: 55
Setting detail: THERAPIES SERIES
Discharge: HOME | End: 2020-07-13
Payer: COMMERCIAL

## 2020-07-13 PROCEDURE — 97110 THERAPEUTIC EXERCISES: CPT
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
## 2020-07-15 ENCOUNTER — HOSPITAL ENCOUNTER (OUTPATIENT)
Dept: PHYSICAL THERAPY | Age: 55
Setting detail: THERAPIES SERIES
Discharge: HOME | End: 2020-07-15
Payer: COMMERCIAL

## 2020-07-15 PROCEDURE — 97110 THERAPEUTIC EXERCISES: CPT
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
## 2020-07-21 ENCOUNTER — HOSPITAL ENCOUNTER (OUTPATIENT)
Dept: PHYSICAL THERAPY | Age: 55
Setting detail: THERAPIES SERIES
Discharge: HOME | End: 2020-07-21
Payer: COMMERCIAL

## 2020-07-21 PROCEDURE — 97530 THERAPEUTIC ACTIVITIES: CPT

## 2020-07-21 NOTE — FLOWSHEET NOTE
5 5   Knee flexion 4+ 4+   Ankle DF 5 5   Ankle PF. 5 5     Modified Oswestry: 10/50= 20% disability (previously 10/45= 22% disability)    No clonus, negative B Babinkski B LE. Exercises, Neuro Facilitation & Gait Training (00872, [id number]): Activity Resistance/Repetitions Other comments     Therapeutic Activities (31304):      Home Exercise Program:     Access Code: [facility]   URL: [employer]/   Date: 05/29/2020   Prepared by: Pamela Estelline   Exercises   · Supine Piriformis Stretch - 3 reps - 3 sets - 30 hold - 1x daily - 7x weekly   · Hooklying Hamstring Stretch with Strap - 3 reps - 3 sets - 30 hold - 1x daily - 7x weekly   · Supine Transversus Abdominis Bracing - Hands on Stomach - 10 reps - 3 sets - 3-5 hold - 1x daily - 4x weekly   · Hooklying Sequential Leg March and Lower - 10 reps - 2 sets - 3-5 hold - 1x daily - 4x weekly   · Standing Heel Raise - 10 reps - 1 sets - 1x daily - 4x weekly   Access Code: [email or web address]   URL: [employer]/   Date: 06/08/2020   Prepared by: Pamela Estelline   Exercises   Sidelying Hip Abduction - 10 reps - 3 sets - 1x daily - 4x weekly     6/23/20: reprinted all HEP      Manual Treatments (35617):     Modalities:       Timed Code Treatment Minutes:  TA 47    Total Treatment Minutes:  47    Treatment/Activity Tolerance:  [x] Patient tolerated treatment well [] Patient limited by fatigue  [] Patient limited by pain  [] Patient limited by other medical complications  [] Other:     Assessment: Pt met 1/1 ST and 2/3 LT goals. Pt remains very focused on feeling of fatigue in B knees, heaviness in knees and ankles and denies pain in low back or radicular symptoms. Pt overall feels some improvement in LE strength, although manual muscle testing inconsistent demonstrating improvements in muscle groups that were previously weak and more weakness in groups that were previously strong. Pt has been mostly compliant w/ HEP.  Due to inability to

## 2020-07-21 NOTE — DISCHARGE SUMMARY
Outpatient Physical Therapy Phone: [phone] Fax: [phone]    Discharge Date: 20    To: Joey Martinez PA-C   From: Gurpreet Orozco PT, DPT  Patient: Pipo Bonds     : 1965 MRN: [id number]   Medical/Treatment Diagnosis Information:  · Diagnosis: M51.36 (ICD-10-CM) - DDD (degenerative disc disease), lumbar; R20.2 (ICD-10-CM) - Paresthesia of both lower extremities    B LE fatigue and B feet numbness      Physical Therapy Discharge Note    Time Period for Report:  20-20    Total Visits to date:   8   Cancels/No-shows to date: 0    Plan of Care/Treatment to date:  [x] Therapeutic Exercise  [x] Therapeutic Activity   [] Gait Training  [x] Neuromuscular Re-education  [x] Manual Therapy  [] Modalities:         [] Ultrasound  [] Electrical Stimulation            [] Cervical Traction [] Lumbar Traction    ? [] Cold/hotpack [] Iontophoresis   Comments:    [] Pt did not adhere to Plan of Care/did not return for follow-up visits. Pain (Eval) 3/10   Pain (D/C) 0/10     Functional Outcome used:     Functional Outcome (Eval) Modified Oswestry: 22% disability   Functional Outcome (D/C) Modified Oswestry:   20% disability     Other significant findings at last visit:  Continues to describe tingling, fatigue, weakness, tiredness in B lower legs, discomfort in knee joints that comes and goes throughout day, is not activity or position dependent. describes feeling of needing to put pressure on ankles or move legs, possibly like a restless leg type of sensation. She is mostly aware of it at rest not during activity, however then describes fatigue feeling in lower legs when walking.        MMT       R L    Hip flexion. 4 4+   Hip extension  4 4   Hip abduction. 5 5   Knee extension 5 5   Knee flexion 4+ 4+   Ankle DF 5 5   Ankle PF. 5 5      Modified Oswestry: 10/50= 20% disability (previously 10/45= 22% disability)     No clonus, negative B Babinski B LE.        Progress towards goals: Pt met 1/1 ST and 2/3 LT goals. Pt remains very focused on feeling of fatigue in B knees, heaviness in knees and ankles and denies pain in low back or radicular symptoms. Pt overall feels some improvement in LE strength, although manual muscle testing inconsistent demonstrating improvements in muscle groups that were previously weak and more weakness in groups that were previously strong. Pt has been mostly compliant w/ HEP. Due to inability to consistently reproduce numbness in feet, unable to isolate treatment strategy beyond core and LE strengthening. Pt has maximized potential benefit at this time. Recommend that pt follow up w/ PCP in regards to her questions/concerns related to vitamin deficiency, fibromyalgia management or possible restless leg syndrome. Pt in agreement w/ D/C today. Goals:  Short term goals  Time Frame for Short term goals: 2 weeks   Short term goal 1: Pt will demonstrate ability to perform HEP following instruction.- Met    Long term goals  Time Frame for Long term goals : 4 weeks   Long term goal 1: Pt will improve B hip flexor strength to 4/5, L hip abd to 5/5 and B gastroc to 4/5 in order to return to regular long distance ambulation. met, strength improved in previously weak muscle groups, slight regression in previousl strong muscle groups. Long term goal 2: Pt will report ability to walk for exercises > 1 mile consistently. met, pt not walking > 1 mile consistently, but reports she is capable. Long term goal 3: Pt will improve Modified Oswestry to >= 44%. Not met, mostly unchanged.        Goal Status:  [] Achieved [x] Partially Achieved  [] Not Achieved     Patient Status: [x] Patient now discharged      Electronically signed by:  Chelsea Carrillo, PT, DPT

## 2020-07-22 ENCOUNTER — HOSPITAL ENCOUNTER (OUTPATIENT)
Dept: MRI IMAGING | Age: 55
Discharge: HOME | End: 2020-07-22
Payer: COMMERCIAL

## 2020-07-22 PROCEDURE — 6360000004 HC RX CONTRAST MEDICATION: Performed by: OBSTETRICS & GYNECOLOGY

## 2020-07-22 PROCEDURE — A9579 GAD-BASE MR CONTRAST NOS,1ML: HCPCS | Performed by: OBSTETRICS & GYNECOLOGY

## 2020-07-22 PROCEDURE — 77049 MRI BREAST C-+ W/CAD BI: CPT

## 2020-07-22 RX ADMIN — GADOTERIDOL 14 ML: 279.3 INJECTION, SOLUTION INTRAVENOUS at 12:21

## 2020-07-31 RX ORDER — ERGOCALCIFEROL 1.25 MG/1
CAPSULE ORAL
Qty: 4 CAPSULE | Refills: 0 | Status: SHIPPED | OUTPATIENT
Start: 2020-07-31 | End: 2021-02-15 | Stop reason: SDUPTHER

## 2020-08-03 RX ORDER — LEVOTHYROXINE SODIUM 0.05 MG/1
TABLET ORAL
Qty: 30 TABLET | Refills: 0 | Status: SHIPPED | OUTPATIENT
Start: 2020-08-03 | End: 2021-03-01

## 2020-08-05 ENCOUNTER — TELEPHONE (OUTPATIENT)
Dept: INTERNAL MEDICINE CLINIC | Age: 55
End: 2020-08-05

## 2020-08-05 RX ORDER — GABAPENTIN 300 MG/1
600 CAPSULE ORAL NIGHTLY
Qty: 60 CAPSULE | Refills: 1 | Status: SHIPPED | OUTPATIENT
Start: 2020-08-05 | End: 2020-10-07 | Stop reason: SDUPTHER

## 2020-08-19 ENCOUNTER — HOSPITAL ENCOUNTER (OUTPATIENT)
Dept: MAMMOGRAPHY | Age: 55
Discharge: HOME | End: 2020-08-19
Payer: COMMERCIAL

## 2020-08-19 PROCEDURE — 77063 BREAST TOMOSYNTHESIS BI: CPT

## 2020-10-07 RX ORDER — GABAPENTIN 300 MG/1
600 CAPSULE ORAL NIGHTLY
Qty: 60 CAPSULE | Refills: 1 | Status: SHIPPED | OUTPATIENT
Start: 2020-10-07 | End: 2020-12-02
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
## 2020-10-07 NOTE — TELEPHONE ENCOUNTER
Orders Placed This Encounter   Medications    gabapentin (NEURONTIN) 300 MG capsule     Sig: Take 2 capsules by mouth nightly for 60 days. Dispense:  60 capsule     Refill:  1         Controlled Substance Monitoring:    Acute and Chronic Pain Monitoring:   RX Monitoring 10/7/2020   Attestation -   Acute Pain Prescriptions -   Periodic Controlled Substance Monitoring No signs of potential drug abuse or diversion identified.    Chronic Pain > 80 MEDD -       Med sent to her [facility]

## 2020-10-23 ENCOUNTER — OFFICE VISIT (OUTPATIENT)
Dept: INTERNAL MEDICINE CLINIC | Age: 55
End: 2020-10-23
Payer: COMMERCIAL

## 2020-10-23 VITALS
BODY MASS INDEX: 26.41 KG/M2 | HEART RATE: 68 BPM | TEMPERATURE: 97.5 F | DIASTOLIC BLOOD PRESSURE: 82 MMHG | SYSTOLIC BLOOD PRESSURE: 122 MMHG | OXYGEN SATURATION: 95 % | WEIGHT: 139.8 LBS

## 2020-10-23 PROCEDURE — G8427 DOCREV CUR MEDS BY ELIG CLIN: HCPCS | Performed by: INTERNAL MEDICINE

## 2020-10-23 PROCEDURE — G8417 CALC BMI ABV UP PARAM F/U: HCPCS | Performed by: INTERNAL MEDICINE

## 2020-10-23 PROCEDURE — 99213 OFFICE O/P EST LOW 20 MIN: CPT | Performed by: INTERNAL MEDICINE

## 2020-10-23 PROCEDURE — G8484 FLU IMMUNIZE NO ADMIN: HCPCS | Performed by: INTERNAL MEDICINE

## 2020-10-23 PROCEDURE — 3017F COLORECTAL CA SCREEN DOC REV: CPT | Performed by: INTERNAL MEDICINE

## 2020-10-23 PROCEDURE — 1036F TOBACCO NON-USER: CPT | Performed by: INTERNAL MEDICINE

## 2020-10-23 RX ORDER — CEPHALEXIN 500 MG/1
500 CAPSULE ORAL 4 TIMES DAILY
Qty: 20 CAPSULE | Refills: 0 | Status: SHIPPED | OUTPATIENT
Start: 2020-10-23 | End: 2020-10-28

## 2020-10-23 ASSESSMENT — ENCOUNTER SYMPTOMS
NAUSEA: 0
COUGH: 0
BACK PAIN: 0
ABDOMINAL PAIN: 0
SHORTNESS OF BREATH: 0
CHEST TIGHTNESS: 0
EYE REDNESS: 0

## 2020-10-23 NOTE — PROGRESS NOTES
Pt present due to boil x2 weeks. She is not sure if it's a pimple due to size (ebony size) and feel. Pt states that the boil is near her vaginal and anal area. It started out hard but has soften over time. Pt believes that the boil could be caused by her anal fistula, close to the bartholin gland.

## 2020-10-23 NOTE — PROGRESS NOTES
Subjective:      Patient ID: Jae Vela is a 50 y.o. female    Chief Complaint   Patient presents with    Other     boil       HPI     Right leg cyst.  On her right upper thigh and the proximal region there is a improving inflamed cyst.  There is no drainage. There is no fever. Minimal pain. No dysuria, no fecal smell, no vaginal or rectal drainage. Current Outpatient Medications on File Prior to Visit   Medication Sig Dispense Refill    gabapentin (NEURONTIN) 300 MG capsule Take 2 capsules by mouth nightly for 60 days. 60 capsule 1    levothyroxine (SYNTHROID) 50 MCG tablet TAKE ONE TABLET BY MOUTH DAILY 30 tablet 0    vitamin D (ERGOCALCIFEROL) 1.25 MG (84626 UT) CAPS capsule TAKE ONE CAPSULE BY MOUTH ONCE WEEKLY 4 capsule 0    ibuprofen (ADVIL;MOTRIN) 200 MG tablet Take 200 mg by mouth every 6 hours as needed for Pain      bismuth subsalicylate (PEPTO-BISMOL) 262 MG chewable tablet Take 2 tablets by mouth      clonazePAM (KLONOPIN) 0.5 MG tablet TAKE ONE TABLET BY MOUTH TWICE A DAY AS NEEDED 60 tablet 1    gabapentin (NEURONTIN) 300 MG capsule TAKE TWO CAPSULES BY MOUTH ONCE NIGHTLY 60 capsule 1    tiZANidine (ZANAFLEX) 2 MG tablet TAKE ONE TABLET BY MOUTH THREE TIMES A DAY AS NEEDED FOR MUSCLE SPASM (Patient not taking: Reported on 10/23/2020) 30 tablet 0    esomeprazole Magnesium (NEXIUM) 20 MG PACK Take 20 mg by mouth      tetracycline (ACHROMYCIN;SUMYCIN) 500 MG capsule Take 500 mg by mouth      metroNIDAZOLE (FLAGYL) 250 MG tablet Take 250 mg by mouth      promethazine (PHENERGAN) 12.5 MG tablet Take 1 tablet by mouth 3 times daily as needed for Nausea (Patient not taking: Reported on 10/23/2020) 15 tablet 0     No current facility-administered medications on file prior to visit.         Allergies   Allergen Reactions    Morphine Swelling     POSSIBLE DELAYED  SWELLING OF THROAT 12 HOURS LATER  USED BENADRYL TO REVERSE    Lamictal [Lamotrigine] Rash    Naproxen Other (See Comments) Stomach     Sulfa Antibiotics Swelling     AND RASH       Review of Systems   Constitutional: Negative for fatigue, fever and unexpected weight change. HENT: Negative for congestion and hearing loss. Eyes: Negative for redness and visual disturbance. Respiratory: Negative for cough, chest tightness and shortness of breath. Cardiovascular: Negative for chest pain and leg swelling. Gastrointestinal: Negative for abdominal pain and nausea. Endocrine: Negative for cold intolerance, heat intolerance, polydipsia and polyuria. Genitourinary: Negative for dysuria and frequency. Musculoskeletal: Negative for arthralgias, back pain and neck pain. Skin: Negative for rash and wound. Neurological: Negative for dizziness, weakness and headaches. Hematological: Negative for adenopathy. Does not bruise/bleed easily. Psychiatric/Behavioral: Negative for sleep disturbance. The patient is not nervous/anxious. Objective:   Physical Exam  Constitutional:       Appearance: She is well-developed. Eyes:      Conjunctiva/sclera: Conjunctivae normal.      Pupils: Pupils are equal, round, and reactive to light. Cardiovascular:      Rate and Rhythm: Normal rate and regular rhythm. Heart sounds: No murmur. Pulmonary:      Effort: Pulmonary effort is normal.      Breath sounds: Normal breath sounds. Skin:     General: Skin is warm and dry. Findings: No rash. Comments: Inflamed cyst of the right upper thigh. 3 cm, soft, mildly reddened, with mild tenderness, improving. Neurological:      General: No focal deficit present. Mental Status: She is oriented to person, place, and time. Psychiatric:         Mood and Affect: Mood normal.         Assessment and plan       1. Inflamed sebaceous cyst  Inflamed cyst, start on antibiotic. Report progress, and follow-up with colorectal surgeon if needed. - cephALEXin (KEFLEX) 500 MG capsule;  Take 1 capsule by mouth 4 times daily for 5 days  Dispense: 20 capsule;  Refill: 0

## 2020-12-02 RX ORDER — GABAPENTIN 300 MG/1
CAPSULE ORAL
Qty: 60 CAPSULE | Refills: 1 | Status: SHIPPED | OUTPATIENT
Start: 2020-12-02 | End: 2021-02-01

## 2020-12-02 NOTE — TELEPHONE ENCOUNTER
Refilled. Orders Placed This Encounter   Medications    gabapentin (NEURONTIN) 300 MG capsule     Sig: TAKE TWO CAPSULES BY MOUTH ONCE NIGHTLY     Dispense:  60 capsule     Refill:  1         Controlled Substance Monitoring:    Acute and Chronic Pain Monitoring:   RX Monitoring 12/2/2020   Attestation -   Acute Pain Prescriptions -   Periodic Controlled Substance Monitoring No signs of potential drug abuse or diversion identified.    Chronic Pain > 80 MEDD -

## 2020-12-04 RX ORDER — GABAPENTIN 300 MG/1
CAPSULE ORAL
Qty: 60 CAPSULE | Refills: 0 | OUTPATIENT
Start: 2020-12-04 | End: 2021-02-02

## 2021-01-30 DIAGNOSIS — M79.7 FIBROMYALGIA: Chronic | ICD-10-CM

## 2021-02-01 RX ORDER — GABAPENTIN 300 MG/1
CAPSULE ORAL
Qty: 60 CAPSULE | Refills: 2 | Status: SHIPPED | OUTPATIENT
Start: 2021-02-01 | End: 2021-04-14 | Stop reason: SDUPTHER

## 2021-02-01 NOTE — TELEPHONE ENCOUNTER
Orders Placed This Encounter   Medications    gabapentin (NEURONTIN) 300 MG capsule     Sig: TAKE TWO CAPSULES BY MOUTH ONCE NIGHTLY     Dispense:  60 capsule     Refill:  2         Controlled Substance Monitoring:    Acute and Chronic Pain Monitoring:   RX Monitoring 2/1/2021   Attestation -   Acute Pain Prescriptions -   Periodic Controlled Substance Monitoring No signs of potential drug abuse or diversion identified.    Chronic Pain > 80 MEDD -

## 2021-02-15 ENCOUNTER — TELEPHONE (OUTPATIENT)
Dept: INTERNAL MEDICINE CLINIC | Age: 56
End: 2021-02-15

## 2021-02-15 DIAGNOSIS — U07.1 COVID-19 VIRUS INFECTION: Primary | ICD-10-CM

## 2021-02-15 DIAGNOSIS — E06.3 HASHIMOTO'S DISEASE: Chronic | ICD-10-CM

## 2021-02-15 RX ORDER — PREDNISONE 20 MG/1
20 TABLET ORAL 2 TIMES DAILY
Qty: 10 TABLET | Refills: 0 | Status: SHIPPED | OUTPATIENT
Start: 2021-02-15 | End: 2021-02-20

## 2021-02-15 RX ORDER — AZITHROMYCIN 250 MG/1
250 TABLET, FILM COATED ORAL SEE ADMIN INSTRUCTIONS
Qty: 6 TABLET | Refills: 0 | Status: SHIPPED | OUTPATIENT
Start: 2021-02-15 | End: 2021-02-22

## 2021-02-15 RX ORDER — ERGOCALCIFEROL 1.25 MG/1
CAPSULE ORAL
Qty: 4 CAPSULE | Refills: 0 | Status: SHIPPED | OUTPATIENT
Start: 2021-02-15 | End: 2021-03-15

## 2021-02-15 NOTE — TELEPHONE ENCOUNTER
Pt needs vitamin d sent to [address] [place] [address] Drive [address] [facility], [address] [facility]

## 2021-02-25 ENCOUNTER — TELEPHONE (OUTPATIENT)
Dept: INTERNAL MEDICINE CLINIC | Age: 56
End: 2021-02-25

## 2021-02-25 NOTE — TELEPHONE ENCOUNTER
Patient called wanting to know if you could write a letter or something that she has been sick and that she was prescribed medication for her COVID and that she has been sick with COVID? She is trying to get her unemployment and she is needing something to suppor there claim ? Patient is aware that the doctor is out of the office. Please e-mail letter to [email or web address] . Please call to advise.

## 2021-02-28 DIAGNOSIS — E06.3 HASHIMOTO'S DISEASE: Chronic | ICD-10-CM

## 2021-03-01 RX ORDER — LEVOTHYROXINE SODIUM 0.05 MG/1
TABLET ORAL
Qty: 30 TABLET | Refills: 0 | Status: SHIPPED | OUTPATIENT
Start: 2021-03-01 | End: 2021-03-29

## 2021-03-01 NOTE — TELEPHONE ENCOUNTER
Medication:   Requested Prescriptions     Pending Prescriptions Disp Refills    levothyroxine (SYNTHROID) 50 MCG tablet [Pharmacy Med Name: LEVOTHYROXINE 50 MCG TABLET] 30 tablet 0     Sig: TAKE ONE TABLET BY MOUTH DAILY       Last Filled:      Patient Phone Number: [phone] (home)     Last appt: 7/7/2020   Next appt: 4/19/2021    Last Thyroid:   Lab Results   Component Value Date    TSH 3.12 02/22/2018    FT3 3.1 07/29/2014    T4FREE 1.3 02/22/2018

## 2021-03-02 NOTE — TELEPHONE ENCOUNTER
Patient called back to check on the status of her inquiry but has never received a call back. She called on 02/25/21. Please call to advise.

## 2021-03-08 ENCOUNTER — TELEPHONE (OUTPATIENT)
Dept: INTERNAL MEDICINE CLINIC | Age: 56
End: 2021-03-08

## 2021-03-08 DIAGNOSIS — M62.838 MUSCLE SPASM: ICD-10-CM

## 2021-03-08 RX ORDER — TIZANIDINE 2 MG/1
TABLET ORAL
Qty: 30 TABLET | Refills: 1 | Status: SHIPPED | OUTPATIENT
Start: 2021-03-08

## 2021-03-08 NOTE — TELEPHONE ENCOUNTER
Orders Placed This Encounter   Medications    tiZANidine (ZANAFLEX) 2 MG tablet     Sig: TAKE ONE TABLET BY MOUTH THREE TIMES A DAY AS NEEDED FOR MUSCLE SPASM     Dispense:  30 tablet     Refill:  1

## 2021-03-08 NOTE — TELEPHONE ENCOUNTER
PT NEEDS REFILL ON   tiZANidine (ZANAFLEX) 2 MG tablet [[id number]    [employer] [address], [address] [phone]   [facility]   Phone:  [phone]  Fax:  [phone]

## 2021-03-13 DIAGNOSIS — E06.3 HASHIMOTO'S DISEASE: Chronic | ICD-10-CM

## 2021-03-15 RX ORDER — ERGOCALCIFEROL 1.25 MG/1
CAPSULE ORAL
Qty: 4 CAPSULE | Refills: 0 | Status: SHIPPED | OUTPATIENT
Start: 2021-03-15 | End: 2021-04-13

## 2021-03-15 NOTE — TELEPHONE ENCOUNTER
Medication:   Requested Prescriptions     Pending Prescriptions Disp Refills    vitamin D (ERGOCALCIFEROL) 1.25 MG (50625 UT) CAPS capsule [Pharmacy Med Name: VIT D2 (ERGOCAL) 1.25MG(50,000U) CP] 4 capsule 0     Sig: TAKE ONE CAPSULE BY MOUTH ONCE WEEKLY       Last Filled:      Patient Phone Number: [phone] (home)     Last appt: 7/7/2020   Next appt: 4/19/2021    Last Labs DM:   Lab Results   Component Value Date    VITD25 64.9 06/08/2020    VITD25 58.1 10/04/2019

## 2021-03-28 DIAGNOSIS — E06.3 HASHIMOTO'S DISEASE: Chronic | ICD-10-CM

## 2021-03-29 RX ORDER — LEVOTHYROXINE SODIUM 0.05 MG/1
TABLET ORAL
Qty: 30 TABLET | Refills: 0 | Status: SHIPPED | OUTPATIENT
Start: 2021-03-29 | End: 2021-05-03

## 2021-03-29 NOTE — TELEPHONE ENCOUNTER
Medication:   Requested Prescriptions     Pending Prescriptions Disp Refills    levothyroxine (SYNTHROID) 50 MCG tablet [Pharmacy Med Name: LEVOTHYROXINE 50 MCG TABLET] 30 tablet 0     Sig: TAKE ONE TABLET BY MOUTH DAILY       Last Filled:      Patient Phone Number: [phone] (home)     Last appt: 7/7/2020   Next appt: 4/19/2021    Last Thyroid:   Lab Results   Component Value Date    TSH 3.12 02/22/2018    FT3 3.1 07/29/2014    T4FREE 1.3 02/22/2018

## 2021-04-13 DIAGNOSIS — E06.3 HASHIMOTO'S DISEASE: Chronic | ICD-10-CM

## 2021-04-13 RX ORDER — ERGOCALCIFEROL 1.25 MG/1
CAPSULE ORAL
Qty: 4 CAPSULE | Refills: 0 | Status: SHIPPED | OUTPATIENT
Start: 2021-04-13 | End: 2021-04-19 | Stop reason: SDUPTHER

## 2021-04-13 NOTE — TELEPHONE ENCOUNTER
Medication:   Requested Prescriptions     Pending Prescriptions Disp Refills    vitamin D (ERGOCALCIFEROL) 1.25 MG (37705 UT) CAPS capsule [Pharmacy Med Name: VIT D2 (ERGOCAL) 1.25MG(50,000U) CP] 4 capsule 0     Sig: TAKE ONE CAPSULE BY MOUTH ONCE WEEKLY       Last Filled:      Patient Phone Number: [phone] (home)     Last appt: 7/7/2020   Next appt: 4/19/2021    Last Labs DM:   Lab Results   Component Value Date    VITD25 64.9 06/08/2020    VITD25 58.1 10/04/2019

## 2021-04-14 ENCOUNTER — OFFICE VISIT (OUTPATIENT)
Dept: INTERNAL MEDICINE CLINIC | Age: 56
End: 2021-04-14
Payer: COMMERCIAL

## 2021-04-14 VITALS
HEIGHT: 61 IN | BODY MASS INDEX: 27.19 KG/M2 | WEIGHT: 144 LBS | OXYGEN SATURATION: 98 % | TEMPERATURE: 97 F | SYSTOLIC BLOOD PRESSURE: 120 MMHG | DIASTOLIC BLOOD PRESSURE: 70 MMHG | HEART RATE: 67 BPM

## 2021-04-14 DIAGNOSIS — E06.3 HASHIMOTO'S DISEASE: Chronic | ICD-10-CM

## 2021-04-14 DIAGNOSIS — G47.9 SLEEP DISORDER: Primary | ICD-10-CM

## 2021-04-14 DIAGNOSIS — M75.02 ADHESIVE CAPSULITIS OF LEFT SHOULDER: ICD-10-CM

## 2021-04-14 DIAGNOSIS — E55.9 VITAMIN D DEFICIENCY: ICD-10-CM

## 2021-04-14 DIAGNOSIS — M79.7 FIBROMYALGIA: Chronic | ICD-10-CM

## 2021-04-14 LAB
TSH REFLEX: 1.85 UIU/ML (ref 0.27–4.2)
VITAMIN D 25-HYDROXY: 50.2 NG/ML

## 2021-04-14 PROCEDURE — 3017F COLORECTAL CA SCREEN DOC REV: CPT | Performed by: INTERNAL MEDICINE

## 2021-04-14 PROCEDURE — G8417 CALC BMI ABV UP PARAM F/U: HCPCS | Performed by: INTERNAL MEDICINE

## 2021-04-14 PROCEDURE — 1036F TOBACCO NON-USER: CPT | Performed by: INTERNAL MEDICINE

## 2021-04-14 PROCEDURE — G8427 DOCREV CUR MEDS BY ELIG CLIN: HCPCS | Performed by: INTERNAL MEDICINE

## 2021-04-14 PROCEDURE — 99213 OFFICE O/P EST LOW 20 MIN: CPT | Performed by: INTERNAL MEDICINE

## 2021-04-14 RX ORDER — GABAPENTIN 300 MG/1
600 CAPSULE ORAL NIGHTLY
Qty: 60 CAPSULE | Refills: 2 | Status: SHIPPED | OUTPATIENT
Start: 2021-04-29 | End: 2021-08-03

## 2021-04-14 SDOH — ECONOMIC STABILITY: FOOD INSECURITY: WITHIN THE PAST 12 MONTHS, THE FOOD YOU BOUGHT JUST DIDN'T LAST AND YOU DIDN'T HAVE MONEY TO GET MORE.: SOMETIMES TRUE

## 2021-04-14 SDOH — ECONOMIC STABILITY: TRANSPORTATION INSECURITY
IN THE PAST 12 MONTHS, HAS LACK OF TRANSPORTATION KEPT YOU FROM MEETINGS, WORK, OR FROM GETTING THINGS NEEDED FOR DAILY LIVING?: NO

## 2021-04-14 ASSESSMENT — ENCOUNTER SYMPTOMS
BACK PAIN: 0
ABDOMINAL PAIN: 0
NAUSEA: 0
EYE REDNESS: 0
COUGH: 0
CHEST TIGHTNESS: 0
SHORTNESS OF BREATH: 0

## 2021-04-14 ASSESSMENT — PATIENT HEALTH QUESTIONNAIRE - PHQ9
1. LITTLE INTEREST OR PLEASURE IN DOING THINGS: 0
SUM OF ALL RESPONSES TO PHQ9 QUESTIONS 1 & 2: 0
SUM OF ALL RESPONSES TO PHQ QUESTIONS 1-9: 0

## 2021-04-14 NOTE — PROGRESS NOTES
for congestion and hearing loss. Eyes: Negative for redness and visual disturbance. Respiratory: Negative for cough, chest tightness and shortness of breath. Cardiovascular: Negative for chest pain and leg swelling. Gastrointestinal: Negative for abdominal pain and nausea. Genitourinary: Negative for dysuria and frequency. Musculoskeletal: Positive for arthralgias (Left shoulder pain). Negative for back pain and neck pain. Skin: Negative for rash and wound. Neurological: Negative for dizziness, weakness and headaches. Hematological: Negative for adenopathy. Does not bruise/bleed easily. Psychiatric/Behavioral: Positive for sleep disturbance. The patient is not nervous/anxious. Objective:   Physical Exam  Constitutional:       Appearance: Normal appearance. She is well-developed. Cardiovascular:      Rate and Rhythm: Normal rate and regular rhythm. Heart sounds: No murmur. Pulmonary:      Effort: Pulmonary effort is normal.      Breath sounds: Normal breath sounds. Skin:     General: Skin is warm and dry. Findings: No rash. Neurological:      General: No focal deficit present. Mental Status: She is alert and oriented to person, place, and time. Psychiatric:         Mood and Affect: Mood normal.         Assessment and plan       1. Sleep disorder  Stable. Continue current medicine.  - gabapentin (NEURONTIN) 300 MG capsule; Take 2 capsules by mouth nightly for 90 days. Dispense: 60 capsule; Refill: 2  Controlled Substance Monitoring:    Acute and Chronic Pain Monitoring:   RX Monitoring 4/14/2021   Attestation -   Acute Pain Prescriptions -   Periodic Controlled Substance Monitoring No signs of potential drug abuse or diversion identified. Chronic Pain > 80 MEDD -         2. Fibromyalgia  Stable. Continue current medicine.  - gabapentin (NEURONTIN) 300 MG capsule; Take 2 capsules by mouth nightly for 90 days. Dispense: 60 capsule; Refill: 2    3.  Adhesive capsulitis of left shoulder  Referral to physical therapy.   - External Referral To Physical Therapy

## 2021-04-19 ENCOUNTER — OFFICE VISIT (OUTPATIENT)
Dept: ENDOCRINOLOGY | Age: 56
End: 2021-04-19
Payer: COMMERCIAL

## 2021-04-19 VITALS
OXYGEN SATURATION: 96 % | BODY MASS INDEX: 27.75 KG/M2 | DIASTOLIC BLOOD PRESSURE: 65 MMHG | WEIGHT: 147 LBS | HEIGHT: 61 IN | HEART RATE: 72 BPM | SYSTOLIC BLOOD PRESSURE: 104 MMHG

## 2021-04-19 DIAGNOSIS — E06.3 HASHIMOTO'S DISEASE: Chronic | ICD-10-CM

## 2021-04-19 DIAGNOSIS — E04.1 THYROID NODULE: Primary | ICD-10-CM

## 2021-04-19 DIAGNOSIS — E55.9 VITAMIN D DEFICIENCY: ICD-10-CM

## 2021-04-19 PROCEDURE — G8427 DOCREV CUR MEDS BY ELIG CLIN: HCPCS | Performed by: INTERNAL MEDICINE

## 2021-04-19 PROCEDURE — 1036F TOBACCO NON-USER: CPT | Performed by: INTERNAL MEDICINE

## 2021-04-19 PROCEDURE — 3017F COLORECTAL CA SCREEN DOC REV: CPT | Performed by: INTERNAL MEDICINE

## 2021-04-19 PROCEDURE — 99214 OFFICE O/P EST MOD 30 MIN: CPT | Performed by: INTERNAL MEDICINE

## 2021-04-19 PROCEDURE — G8417 CALC BMI ABV UP PARAM F/U: HCPCS | Performed by: INTERNAL MEDICINE

## 2021-04-19 RX ORDER — ERGOCALCIFEROL 1.25 MG/1
CAPSULE ORAL
Qty: 6 CAPSULE | Refills: 2 | Status: SHIPPED | OUTPATIENT
Start: 2021-04-19 | End: 2021-05-10

## 2021-04-19 NOTE — PROGRESS NOTES
Subjective:      50  y.o WF who is here for thyroid evaluation. Interim:     Stable  No issues    Initial complaints: Fatigue improved ,  sleep problems,    muscle and joint pain,  Anxiety improved,  confusion,cold intolerance, brain fog  History of obstructive symptoms:  difficulty swallowing No, changes in voice/hoarseness  No.    History of radiation to patient's neck:   No  Recent iodine exposure:  No  Family history includes mom and sister hypothyroidism. Family history of thyroid cancer:  No    Current smoking of cigarettes or cigars: No    Hypothyroidism for year    Mild controlled    TFT:  10/15 TSH 2.72 Vit D 23.8   6/15 TSH 2.18  Vitamin D 27  4/15 TSH 2.04 TPO 8 Vit D 12.4 start on vitamin D Replacement also taking D3 1000daily  11/14 TSH 2.42 increased to 50mcg  7/14  TSH 3.22 FT4 1.1  11/13 TSH 2.04 Free level normal  6/13  TSH 6.8  FT4  0.9 LTX 25mcg  6/13  TSH 3.75 FT4 0.7  3/13  TSH 3.5 FT4 0.85  3/13  TSH 6.03 FT4 0.77  9/11  TSH 3.53    She has thyroid nodules    Thyroid ultrasound 6/13     The right thyroid lobe is 3.5 x 1.3 x 1.3 cm in size. Left is 3.3   x 1.4 x 1.0 cm in size. Isthmus is 5 mm in thickness. Thyroid gland is mildly inhomogeneous. Two hypoechoic nodules are   demonstrated within the mid to upper right lobe, 9 x 6 mm and 8 x   6 mm. IMPRESSION:   2 nonspecific right thyroid hypoechoic nodules. Further   evaluation versus continued short-term followup would be   suggested. 11/14 Thyroid Ultrasound:  Right thyroid lobe measures 1.5 x 1.2 x 3.8 cm   Left lobe measures 1.3 x 0.9 x 3.4 cm. Two subtle nodules in the right thyroid lobe measure 9 x 6 x 5 mm   and 5 x 5 x 4 mm, without significant interval change. No   discrete nodule is present within the left lobe or within the   isthmus. Small benign-appearing lymph node is located inferior to   the left lobe with central fatty hilum and normal cortical   thickness, measuring under 2 mm.     Stable    4/19 Stable nodules 6 and 5mm    She had worsening symptoms after being off SSRI. She is taking klonipin currently. Started on levothyroxine 25mcg initially ,   Cortisol 8.7     TSH 2.38  Vit D 41  Levothyroxine 50mcg    On Vit D 50,000IU twice a week   TSH 3.17   Vit D 52 on    50,000 IU weekly   TSH 1.74 Vit D 55.5   Vit D 64 TSH  1.45 A1c 5.5   TSH 1.85 Vit D 50    Vit D every 2 weeks    Past Medical History:   Diagnosis Date    Back pain     Chicken pox     Depression     NO MEDS NOW OKAY    Fibromyalgia     Gastric ulcer, unspecified as acute or chronic, without mention of hemorrhage, perforation, or obstruction     Gene mutation     chek 22-needs breast MRI    GERD (gastroesophageal reflux disease)     Insomnia     Sleep study 1992:dxed w/interuption onset insomnia    Marijuana use     no further controlled substances while using marijuana.  Mixed hyperlipidemia 10/17/2011    SLIGHT ELEVATION NO MEDS    Mononucleosis     Panic attacks     Pap smear 2010;10/17/11;10/2012    Nml. Dr. Maddox(prior gyn)    Rectal cancer [facility]) 2017    Screening mammogram 2010;2011;2013    Nml.      Sleep apnea     DOESN'T KNOW SETTINGS    Subclinical hypothyroidism 2013    Thyroid nodules:Right 2013    Wheezing 2013     Past Surgical History:   Procedure Laterality Date     SECTION      CHOLECYSTECTOMY N/A 10/13/2015   [facility] D[facility]AL SUR[facility]ERY      ENDOSCOPY, COLON, DIAGNOSTIC      LEFT COLECTOMY  2017    Danika Childs MD, rectal cancer     OTHER SURGICAL HISTORY  10/13/15    LAPAROSCOPIC CHOLECYSTECTOMY    PILONIDAL CYST EXCISION      TONSILLECTOMY      UPPER GASTROINTESTINAL ENDOSCOPY N/A 2019    EGD BIOPSY performed by Rayne Escamilla MD at [address] ENDOSCOPY     Current Outpatient Medications   Medication Sig Dispense Refill    Naproxen Sodium (ALEVE PO) Take by mouth      [START ON 2021] gabapentin (NEURONTIN) 300 MG capsule Take 2 capsules by mouth nightly for 90 days. 60 capsule 2    vitamin D (ERGOCALCIFEROL) 1.25 MG (58105 UT) CAPS capsule TAKE ONE CAPSULE BY MOUTH ONCE WEEKLY 4 capsule 0    levothyroxine (SYNTHROID) 50 MCG tablet TAKE ONE TABLET BY MOUTH DAILY 30 tablet 0    tiZANidine (ZANAFLEX) 2 MG tablet TAKE ONE TABLET BY MOUTH THREE TIMES A DAY AS NEEDED FOR MUSCLE SPASM 30 tablet 1    ibuprofen (ADVIL;MOTRIN) 200 MG tablet Take 200 mg by mouth every 6 hours as needed for Pain      bismuth subsalicylate (PEPTO-BISMOL) 262 MG chewable tablet Take 2 tablets by mouth      clonazePAM (KLONOPIN) 0.5 MG tablet TAKE ONE TABLET BY MOUTH TWICE A DAY AS NEEDED 60 tablet 1    tetracycline (ACHROMYCIN;SUMYCIN) 500 MG capsule Take 500 mg by mouth      metroNIDAZOLE (FLAGYL) 250 MG tablet Take 250 mg by mouth       No current facility-administered medications for this visit. Review of Systems  Scanned, reviewed    Vitals:    04/19/21 1638   BP: 104/65   Pulse: 72   SpO2: 96%       Constitutional: Well-developed, appears stated age, cooperative, in no acute distress  H/E/N/M/T:atraumatic, normocephalic, external ears, nose, lips normal without lesions  Eyes: Lids, lashes, conjunctivae and sclerae normal, No proptosis, no redness  Neck: supple, symmetrical, no swelling  Skin: No obvious rashes or lesions present. Skin and hair texture normal  Psychiatric: Judgement and Insight:  judgement and insight appear normal  Neuro: Normal without focal findings, speech is normal normal, speech is spontaneous  Chest: No labored breathing, no chest deformity, no stridor  Musculoskeletal: No joint deformity, swelling      Lab Review  Lab Results   Component Value Date    TSH 3.12 02/22/2018     No results found for: FREET4      Assessment: 1. Subclinical Hypothyroidism:                         She had a high TSH with low normal FT4 indicative of subclinical hypothyroidism.  TPO antibodies are normal, but reviewed images, heterogenous gland indicative of Hashimotos. I discussed with her that with her levels, I do no think her anxiety is due to her thyroid, given her fatigue, weakness, cold intolerance,  started on levothyroxine, now biochemically euthyroid. She had question about T3, told her level was normal.Advised symptoms due to stress/depression    2. Thyroid Nodules: Reviewed images, two right sided sub centimeter nodules, ill defined, pseudo nodules? Recommend to monitor. USG 5/19 shows stable 5mm and 6mm nodule    3. Anxiety/Depression: On klonipin as needed, does not want anti depressant, follow PCP, stress is better    4. Vitamin D deficiency: On replacement, taking weekly, level improved , continue, changed dose to every 2 weeks    Plan: 1. Continue levothyroxine,  50mcg one tab daily,  vitamin D every 2 weeks  2. Check TFT and vitamin D before next visit  3. Thyroid Nodule f/u clinically [facility] 2022  4. F/u in one year

## 2021-05-01 DIAGNOSIS — E06.3 HASHIMOTO'S DISEASE: Chronic | ICD-10-CM

## 2021-05-03 RX ORDER — LEVOTHYROXINE SODIUM 0.05 MG/1
TABLET ORAL
Qty: 30 TABLET | Refills: 0 | Status: SHIPPED | OUTPATIENT
Start: 2021-05-03 | End: 2021-06-01

## 2021-05-03 NOTE — TELEPHONE ENCOUNTER
Medication:   Requested Prescriptions     Pending Prescriptions Disp Refills    levothyroxine (SYNTHROID) 50 MCG tablet [Pharmacy Med Name: LEVOTHYROXINE 50 MCG TABLET] 30 tablet 0     Sig: TAKE ONE TABLET BY MOUTH DAILY       Last Filled:      Patient Phone Number: [phone] (home)     Last appt: 4/19/2021   Next appt: Visit date not found    Last Thyroid:   Lab Results   Component Value Date    TSH 3.12 02/22/2018    FT3 3.1 07/29/2014    T4FREE 1.3 02/22/2018

## 2021-05-10 DIAGNOSIS — E06.3 HASHIMOTO'S DISEASE: Chronic | ICD-10-CM

## 2021-05-10 RX ORDER — ERGOCALCIFEROL 1.25 MG/1
CAPSULE ORAL
Qty: 4 CAPSULE | Refills: 0 | Status: SHIPPED | OUTPATIENT
Start: 2021-05-10 | End: 2021-06-04

## 2021-05-10 NOTE — TELEPHONE ENCOUNTER
Medication:   Requested Prescriptions     Pending Prescriptions Disp Refills    vitamin D (ERGOCALCIFEROL) 1.25 MG (90967 UT) CAPS capsule [Pharmacy Med Name: VIT D2 (ERGOCAL) 1.25MG(50,000U) CP] 4 capsule 0     Sig: TAKE ONE CAPSULE BY MOUTH ONCE WEEKLY       Last Filled:      Patient Phone Number: [phone] (home)     Last appt: 4/19/2021   Next appt: Visit date not found    Last Labs DM:   Lab Results   Component Value Date    VITD25 50.2 04/14/2021    VITD25 64.9 06/08/2020

## 2021-05-31 DIAGNOSIS — E06.3 HASHIMOTO'S DISEASE: Chronic | ICD-10-CM

## 2021-06-01 RX ORDER — LEVOTHYROXINE SODIUM 0.05 MG/1
TABLET ORAL
Qty: 30 TABLET | Refills: 0 | Status: SHIPPED | OUTPATIENT
Start: 2021-06-01 | End: 2021-06-29 | Stop reason: SDUPTHER

## 2021-06-01 NOTE — TELEPHONE ENCOUNTER
Medication:   Requested Prescriptions     Pending Prescriptions Disp Refills    levothyroxine (SYNTHROID) 50 MCG tablet [Pharmacy Med Name: LEVOTHYROXINE 50 MCG TABLET] 30 tablet 0     Sig: TAKE ONE TABLET BY MOUTH DAILY       Last Filled:      Patient Phone Number: [phone] (home)     Last appt: 4/19/2021   Next appt: Visit date not found    Last Thyroid:   Lab Results   Component Value Date    TSH 3.12 02/22/2018    FT3 3.1 07/29/2014    T4FREE 1.3 02/22/2018

## 2021-06-02 ENCOUNTER — CLINICAL DOCUMENTATION (OUTPATIENT)
Dept: OTHER | Age: 56
End: 2021-06-02

## 2021-06-04 DIAGNOSIS — E06.3 HASHIMOTO'S DISEASE: Chronic | ICD-10-CM

## 2021-06-04 RX ORDER — ERGOCALCIFEROL 1.25 MG/1
CAPSULE ORAL
Qty: 4 CAPSULE | Refills: 0 | Status: SHIPPED | OUTPATIENT
Start: 2021-06-04 | End: 2021-06-29 | Stop reason: SDUPTHER

## 2021-06-04 NOTE — TELEPHONE ENCOUNTER
Called pt to schedule, she said she just saw Dr. Milly Scheuermann and was told her levels are good and to f/u in one year. She did not wish to schedule a f/u at this time.

## 2021-06-04 NOTE — TELEPHONE ENCOUNTER
Requested Prescriptions     Pending Prescriptions Disp Refills    vitamin D (ERGOCALCIFEROL) 1.25 MG (41065 UT) CAPS capsule [Pharmacy Med Name: VIT D2 (ERGOCAL) 1.25MG(50,000U) CP] 4 capsule 0     Sig: TAKE ONE CAPSULE BY MOUTH ONCE WEEKLY         LOV: 04/19/2021    FOV: none

## 2021-06-29 RX ORDER — ERGOCALCIFEROL 1.25 MG/1
CAPSULE ORAL
Qty: 4 CAPSULE | Refills: 0 | Status: SHIPPED | OUTPATIENT
Start: 2021-06-29 | End: 2021-07-30

## 2021-06-29 RX ORDER — LEVOTHYROXINE SODIUM 0.05 MG/1
TABLET ORAL
Qty: 30 TABLET | Refills: 0 | Status: SHIPPED | OUTPATIENT
Start: 2021-06-29 | End: 2021-08-02

## 2021-06-29 NOTE — TELEPHONE ENCOUNTER
Patient called and said she needs her vitamin D and levothyroxine refilled.   She stated she does not need an appt for a year and she was just seen

## 2021-06-30 DIAGNOSIS — E06.3 HASHIMOTO'S DISEASE: Chronic | ICD-10-CM

## 2021-06-30 RX ORDER — ERGOCALCIFEROL 1.25 MG/1
CAPSULE ORAL
Qty: 4 CAPSULE | Refills: 0 | OUTPATIENT
Start: 2021-06-30

## 2021-07-06 ENCOUNTER — OFFICE VISIT (OUTPATIENT)
Dept: GYNECOLOGY | Age: 56
End: 2021-07-06
Payer: COMMERCIAL

## 2021-07-06 VITALS
WEIGHT: 146.2 LBS | HEIGHT: 62 IN | OXYGEN SATURATION: 97 % | DIASTOLIC BLOOD PRESSURE: 76 MMHG | RESPIRATION RATE: 17 BRPM | HEART RATE: 70 BPM | SYSTOLIC BLOOD PRESSURE: 108 MMHG | BODY MASS INDEX: 26.91 KG/M2

## 2021-07-06 DIAGNOSIS — Z15.89 GENE MUTATION: ICD-10-CM

## 2021-07-06 DIAGNOSIS — Z01.419 WELL WOMAN EXAM WITH ROUTINE GYNECOLOGICAL EXAM: Primary | ICD-10-CM

## 2021-07-06 PROCEDURE — 99396 PREV VISIT EST AGE 40-64: CPT | Performed by: OBSTETRICS & GYNECOLOGY

## 2021-07-30 DIAGNOSIS — E06.3 HASHIMOTO'S DISEASE: Chronic | ICD-10-CM

## 2021-07-30 RX ORDER — ERGOCALCIFEROL 1.25 MG/1
CAPSULE ORAL
Qty: 4 CAPSULE | Refills: 0 | Status: SHIPPED | OUTPATIENT
Start: 2021-07-30 | End: 2021-08-31

## 2021-07-30 NOTE — TELEPHONE ENCOUNTER
Medication:   Requested Prescriptions     Pending Prescriptions Disp Refills    vitamin D (ERGOCALCIFEROL) 1.25 MG (61579 UT) CAPS capsule [Pharmacy Med Name: VIT D2 (ERGOCAL) 1.25MG(50,000U) CP] 4 capsule 0     Sig: TAKE ONE CAPSULE BY MOUTH ONCE WEEKLY       Last Filled:      Patient Phone Number: [phone] (home)     Last appt: 4/19/2021   Next appt: Visit date not found    Last Labs DM:   Lab Results   Component Value Date    VITD25 50.2 04/14/2021    VITD25 64.9 06/08/2020

## 2021-08-01 DIAGNOSIS — M79.7 FIBROMYALGIA: Chronic | ICD-10-CM

## 2021-08-01 DIAGNOSIS — E06.3 HASHIMOTO'S DISEASE: Chronic | ICD-10-CM

## 2021-08-01 DIAGNOSIS — G47.9 SLEEP DISORDER: ICD-10-CM

## 2021-08-02 RX ORDER — LEVOTHYROXINE SODIUM 0.05 MG/1
TABLET ORAL
Qty: 30 TABLET | Refills: 8 | Status: SHIPPED | OUTPATIENT
Start: 2021-08-02 | End: 2022-05-23 | Stop reason: SDUPTHER

## 2021-08-02 NOTE — TELEPHONE ENCOUNTER
Medication:   Requested Prescriptions     Pending Prescriptions Disp Refills    levothyroxine (SYNTHROID) 50 MCG tablet [Pharmacy Med Name: LEVOTHYROXINE 50 MCG TABLET] 30 tablet 11     Sig: TAKE ONE TABLET BY MOUTH DAILY         Last appt: 4/19/2021   Next appt: around 04/19/2022    Last Thyroid:   Lab Results   Component Value Date    TSH 3.12 02/22/2018    FT3 3.1 07/29/2014    T4FREE 1.3 02/22/2018

## 2021-08-03 RX ORDER — GABAPENTIN 300 MG/1
CAPSULE ORAL
Qty: 60 CAPSULE | Refills: 1 | Status: SHIPPED | OUTPATIENT
Start: 2021-08-03 | End: 2021-10-01 | Stop reason: SDUPTHER

## 2021-08-29 DIAGNOSIS — E06.3 HASHIMOTO'S DISEASE: Chronic | ICD-10-CM

## 2021-08-31 RX ORDER — ERGOCALCIFEROL 1.25 MG/1
CAPSULE ORAL
Qty: 4 CAPSULE | Refills: 0 | Status: SHIPPED | OUTPATIENT
Start: 2021-08-31 | End: 2021-09-27

## 2021-08-31 NOTE — TELEPHONE ENCOUNTER
Medication:   Requested Prescriptions     Pending Prescriptions Disp Refills    vitamin D (ERGOCALCIFEROL) 1.25 MG (28575 UT) CAPS capsule [Pharmacy Med Name: VIT D2 (ERGOCAL) 1.25MG(50,000U) CP] 4 capsule 0     Sig: TAKE ONE CAPSULE BY MOUTH ONCE WEEKLY       Last Filled:      Patient Phone Number: [phone] (home)     Last appt: 4/19/2021   Next appt: Visit date not found    Last Labs DM:   Lab Results   Component Value Date    VITD25 50.2 04/14/2021    VITD25 64.9 06/08/2020

## 2021-09-13 ENCOUNTER — TELEPHONE (OUTPATIENT)
Dept: SURGERY | Age: 56
End: 2021-09-13

## 2021-09-13 NOTE — TELEPHONE ENCOUNTER
Spoke to patient and reviewed new patient intake form and confirmed appointment for Wednesday 9/15/21 at 2 pm.

## 2021-09-15 ENCOUNTER — OFFICE VISIT (OUTPATIENT)
Dept: SURGERY | Age: 56
End: 2021-09-15
Payer: COMMERCIAL

## 2021-09-15 VITALS
HEIGHT: 62 IN | HEART RATE: 67 BPM | RESPIRATION RATE: 18 BRPM | SYSTOLIC BLOOD PRESSURE: 128 MMHG | TEMPERATURE: 97.8 F | OXYGEN SATURATION: 98 % | DIASTOLIC BLOOD PRESSURE: 72 MMHG | BODY MASS INDEX: 26.72 KG/M2 | WEIGHT: 145.2 LBS

## 2021-09-15 DIAGNOSIS — Z80.3 FAMILY HISTORY OF BREAST CANCER: ICD-10-CM

## 2021-09-15 DIAGNOSIS — Z15.09 BIALLELIC MUTATION OF CHEK2 GENE: ICD-10-CM

## 2021-09-15 DIAGNOSIS — Z91.89 AT HIGH RISK FOR BREAST CANCER: ICD-10-CM

## 2021-09-15 DIAGNOSIS — N63.0 BREAST MASS: Primary | ICD-10-CM

## 2021-09-15 DIAGNOSIS — Z15.01 BIALLELIC MUTATION OF CHEK2 GENE: ICD-10-CM

## 2021-09-15 PROCEDURE — 99244 OFF/OP CNSLTJ NEW/EST MOD 40: CPT | Performed by: SURGERY

## 2021-09-15 PROCEDURE — G8417 CALC BMI ABV UP PARAM F/U: HCPCS | Performed by: SURGERY

## 2021-09-15 PROCEDURE — G8427 DOCREV CUR MEDS BY ELIG CLIN: HCPCS | Performed by: SURGERY

## 2021-09-15 ASSESSMENT — ENCOUNTER SYMPTOMS
ABDOMINAL PAIN: 0
SHORTNESS OF BREATH: 0
COUGH: 0

## 2021-09-15 NOTE — PROGRESS NOTES
Gynecologic History  Menarche at age 17    She delivered her first child at age 41, and did not breastfeed  Postmenopausal at age ? No hysterectomy  Oral contraceptive use: yes for 15 years and is not currently taking  Hormone use: no and is not currently taking    Bra Size: 36-38 C    Review of Systems   Constitutional: Negative for unexpected weight change. Eyes: Negative for visual disturbance. Respiratory: Negative for cough and shortness of breath. Cardiovascular: Negative for chest pain and palpitations. Gastrointestinal: Negative for abdominal pain. Musculoskeletal: Negative for arthralgias and myalgias. Neurological: Negative for headaches. Hematological: Negative for adenopathy. Does not bruise/bleed easily. Psychiatric/Behavioral: Negative for dysphoric mood. The patient is not nervous/anxious.

## 2021-09-16 NOTE — PROGRESS NOTES
09/15/21    CHIEF COMPLAINT:  High risk for future breast cancer, CHEK2 mutation    HISTORY OF PRESENT ILLNESS:  Chu Mejia is a 49 y. o. woman referred by Mansi Hutchison MD who requested that I evaluate her for her elevated risk for future breast cancer development based on genetic testing which demonstrated a CHEK2 pathogenic mutation. She presents today to discuss her elevated risk for breast cancer and the best mechanism for surveillance and/or prevention. The patient states that she herself has not noticed any abnormal masses in either breast.  She denies any change in the appearance of her breasts or the skin of her breasts. She denies bilateral nipple discharge. She has no other systemic complaints and otherwise feels well today. Pertinent Family History: The patient has a personal history of colon cancer at age 50. She has a paternal great aunt who was diagnosed with breast cancer at an unknown age. One of her maternal cousins was diagnosed with breast cancer in her 76s. 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 No hysterectomy  Oral contraceptive use: yes for 15 years and is not currently taking  Hormone use: no and is not currently taking    Past Medical History:   Diagnosis Date    Back pain     Chicken pox     Depression     NO MEDS NOW OKAY    Fibromyalgia     Gastric ulcer, unspecified as acute or chronic, without mention of hemorrhage, perforation, or obstruction 1989    Gene mutation     chek 22-needs breast MRI    GERD (gastroesophageal reflux disease)     Insomnia 1992    Sleep study 1992:dxed w/interuption onset insomnia    Marijuana use     no further controlled substances while using marijuana.     Mixed hyperlipidemia 10/17/2011    SLIGHT ELEVATION NO MEDS    Mononucleosis     Panic attacks     Pap smear 2010;10/17/11;10/2012    Nml. Dr. Maddox(prior gyn)    Rectal cancer [facility]) 2017    Screening mammogram 2010;2011;2013    Nml.  Sleep apnea     DOESN'T KNOW SETTINGS    Subclinical hypothyroidism 2013    Thyroid nodules:Right 2013    Wheezing 2013     Past Surgical History:   Procedure Laterality Date     SECTION      CHOLECYSTECTOMY N/A 10/13/2015    DENTAL SURGERY      ENDOSCOPY, COLON, DIAGNOSTIC      LEFT COLECTOMY  2017    Jose Cruz Manuel MD, rectal cancer     OTHER SURGICAL HISTORY  10/13/15    LAPAROSCOPIC CHOLECYSTECTOMY    PILONIDAL CYST EXCISION      TONSILLECTOMY      UPPER GASTROINTESTINAL ENDOSCOPY N/A 2019    EGD BIOPSY performed by Jay Lugo MD at [address] ENDOSCOPY     Current Outpatient Medications   Medication Sig Dispense Refill    vitamin D (ERGOCALCIFEROL) 1.25 MG (33552 UT) CAPS capsule TAKE ONE CAPSULE BY MOUTH ONCE WEEKLY 4 capsule 0    gabapentin (NEURONTIN) 300 MG capsule TAKE TWO CAPSULES BY MOUTH ONCE NIGHTLY 60 capsule 1    levothyroxine (SYNTHROID) 50 MCG tablet TAKE ONE TABLET BY MOUTH DAILY 30 tablet 8    Naproxen Sodium (ALEVE PO) Take by mouth      tiZANidine (ZANAFLEX) 2 MG tablet TAKE ONE TABLET BY MOUTH THREE TIMES A DAY AS NEEDED FOR MUSCLE SPASM 30 tablet 1    ibuprofen (ADVIL;MOTRIN) 200 MG tablet Take 200 mg by mouth every 6 hours as needed for Pain      bismuth subsalicylate (PEPTO-BISMOL) 262 MG chewable tablet Take 2 tablets by mouth      tetracycline (ACHROMYCIN;SUMYCIN) 500 MG capsule Take 500 mg by mouth       metroNIDAZOLE (FLAGYL) 250 MG tablet Take 250 mg by mouth       clonazePAM (KLONOPIN) 0.5 MG tablet TAKE ONE TABLET BY MOUTH TWICE A DAY AS NEEDED 60 tablet 1     No current facility-administered medications for this visit.      Allergies   Allergen Reactions    Morphine Swelling     POSSIBLE DELAYED SWELLING OF THROAT 12 HOURS LATER  USED BENADRYL TO REVERSE    Lamictal [Lamotrigine] Rash    Naproxen Other (See Comments)     Stomach     Sulfa Antibiotics Swelling     AND RASH     Social History     Socioeconomic History    Marital status: Single     Spouse name: Not on file    Number of children: 1    Years of education: Not on file    Highest education level: Not on file   Occupational History    Not on file   Tobacco Use    Smoking status: Former Smoker     Packs/day: 1.00     Years: 30.00     Pack years: 30.00     Types: Cigarettes     Quit date: 2009     Years since quittin.2    Smokeless tobacco: Never Used   Vaping Use    Vaping Use: Never used   Substance and Sexual Activity    Alcohol use: Yes     Alcohol/week: 1.0 standard drinks     Types: 1 Cans of beer per week     Comment: Occasional    Drug use: No     Comment: marijuana- DAILY On 10/13/15 with mom in room states last used marijuana several months ago    Sexual activity: Yes     Partners: Male     Birth control/protection: Condom   Other Topics Concern    Not on file   Social History Narrative    Not on file     Social Determinants of Health     Financial Resource Strain: Low Risk     Difficulty of Paying Living Expenses: Not very hard   Food Insecurity: Food Insecurity Present    Worried About Running Out of Food in the Last Year: Sometimes true    [place] of Food in the Last Year: Sometimes true   Transportation Needs: No Transportation Needs    Lack of Transportation (Medical): No    Lack of Transportation (Non-Medical):  No   Physical Activity:     Days of Exercise per Week:     Minutes of Exercise per Session:    Stress:     Feeling of Stress :    Social Connections:     Frequency of Communication with Friends and Family:     Frequency of Social Gatherings with Friends and Family:     Attends Yarsani Services:     Active Member of Clubs or Organizations:     Attends Club or Organization Meetings:    Indio Carranza Marital Status:    Intimate Partner Violence:     Fear of Current or Ex-Partner:     Emotionally Abused:     Physically Abused:     Sexually Abused:      Family History   Problem Relation Age of Onset    High Cholesterol Mother     High Blood Pressure Father     Heart Disease Father     COPD Father     Stroke Maternal Grandfather     Thyroid Disease Sister         Hypothyroidism     REVIEW OF SYSTEMS:  Constitutional: Negative for unexpected weight change. Eyes: Negative for visual disturbance. Respiratory: Negative for cough and shortness of breath. Cardiovascular: Negative for chest pain and palpitations. Gastrointestinal: Negative for abdominal pain. Musculoskeletal: Negative for arthralgias and myalgias. Neurological: Negative for headaches. Hematological: Negative for adenopathy. Does not bruise/bleed easily. Psychiatric/Behavioral: Negative for dysphoric mood. The patient is not nervous/anxious. I personally reviewed and agree with the above ROS as documented by my medical assistant. PHYSICAL EXAMINATION:   Vitals: /72 (Site: Right Upper Arm, Position: Sitting, Cuff Size: Medium Adult)   Pulse 67   Temp 97.8 °F (36.6 °C)   Resp 18   Ht 5' 1.5\" (1.562 m)   Wt 145 lb 3.2 oz (65.9 kg)   SpO2 98%   BMI 26.99 kg/m²   General: Well-developed, well-nourished, in no apparent distress. Eyes:  Conjunctivae appear normal. Pupils are equal and reactive. Extraocular movements are intact. The sclerae are not injected and show no jaundice. Nose, Mouth and Throat:  Patient is wearing a mask. Respiratory: Normal respiratory effort, clear to auscultation bilaterally. Cardiovascular: Regular rate and rhythm. No lower extremity edema. Gastrointestinal: Soft, nontender, nondistended, without obvious masses or hernias. Musculoskeletal: Normal gait and range of motion in all 4 extremities. Psychiatric: Alert and oriented x 3. Appropriate affect and behavior for today's visit. Skin: No concerning rashes, lesions, nodules or other skin changes. Lymphatic System:  No concerning cervical, supraclavicular or axillary lymphadenopathy. Breast Exam:  The breasts are normal in contour. Bra size 36-38C. Right Breast: Examination of the right breast in the upright and supine positions reveals a 1 cm round mobile mass at 4:00 4.5 CFN. There are no other obvious masses, skin changes, dimpling, or retraction. The nipple and areola are without erosion, edema or ulceration. There is no obvious nipple discharge. There is no concerning axillary adenopathy. Left Breast: Examination of the left breast in the upright and supine positions reveal no obvious masses, skin changes, dimpling, or retraction. The nipple and areola are without erosion, edema or ulceration. There is no obvious nipple discharge. There is no concerning axillary adenopathy. IMPRESSION/RECOMMENDATION:    Gricelda Caro is a 49 y. o. woman who presents today for consultation regarding her elevated risk for future breast cancer development. This is based on genetic testing which was positive for a CHEK2 gene mutation. Ms. Azalia Valentin was given a copy of her results. Her testing was done at [place] [address], where she had a 19-gene panel testing through [facility] in June 2018 after her personal diagnosis of colon cancer. This revealed a pathogenic in CHEK2, specifically c.1100delC. We reviewed her genetic test results in the context of her family history and all of the information below. I also provided her with a written summary. With every pregnancy there is a 50% chance of passing on the mutation. These results may impact future childbearing. It is important that her family members that have the potential of harboring this mutation be given a copy of the results and be encouraged to pursue testing. If relatives do not have the same mutation, they did not inherit the associated increased cancer risk.   However, family members who do test positive have increased cancer risk as related to the gene. CHEK2 mutations are associated with an increased risk of breast and colon cancer. Most of the increased risk known about and seen in [place] involves a specific mutation, 1100delC. Other mutations in CHEK2, including [id number], likely have less breast cancer risk. This mutation confers a lifetime risk of developing breast cancer of approximately 28-37% (vs approximately 12% in the general population). Different CHEK2 mutations have different levels of risk and family history plays a large role as women with stronger family histories of breast cancer have a higher risk than those without a strong family history of breast cancer. We reviewed the NCCN Guidelines (Version 1.2022) on the management of patients with CHEK2 pathogenic mutations and she was given a copy of these guidelines. This includes high risk screening and risk reduction strategies which are summarized below. Breast cancer screening recommendations include:   Mammogram every year starting at age 36    Annual breast MRI can be considered started at age 36  o In general, when combining screening MRI and mammography, they are performed on the alternate 6 months   Although not included in the guidelines, I also recommend annual clinical breast exam and breast self exam (BSE)   The age at which to start screening also depends on the family history    We discussed the caveats of MRI:   Although MRI has high sensitivity, the low specificity means that additional evaluation and biopsies may be needed.  MRI may be difficult for women with claustrophobia or for whom laying on their stomach for the duration of the study is difficult or uncomfortable.  MRI requires IV gadolinium dye. Gadolinium has been shown to deposit in brain tissue, but this is of unknown significance.      MRI is expensive, and may be impractical for women with high out-of-pocket costs.  Women who are pregnant or who may become pregnant cannot undergo breast MRI, and nor can women with poor creatinine clearance. Breast cancer risk reduction strategies include:   Chemoprophylaxis (anti-estrogen medication) is not a recommended risk reduction strategy, but theoretically may reduce risk given that many CHEK2 cancers are ER+   Risk-reducing mastectomies (RRM) may be considered based on FH but the presence of a CHEK2 mutation is likely not enough to justify such an aggressive procedure  o Decreases the risk of breast cancer by 90-95%    Ms. Ruby Marin is not up to date on her breast screening examinations, so I have ordered bilateral mammograms including a right breast ultrasound for a palpable mass found on examination. She will return for these images and I will call her with the results. Pending these findings, we will then discuss when to obtain a breast MRI as she prefers careful surveillance with alternating mammography and breast MRI. She continues to follow with her surgeon and oncologist regarding her personal history of colon cancer. I answered all of her questions thoroughly, and she does seem pleased with this plan of approach. I encouraged her to contact me in the interim if any new questions or concerns arise.      Jessica Cristal was given information on resources, including [email or web address].    Summary:  - bilateral diagnostic mammogram, right limited breast ultrasound for palpable right breast mass  - will discuss timing of breast MRI once mammogram and ultrasound have been completed    Semaj Bryant MD

## 2021-09-23 ENCOUNTER — TELEPHONE (OUTPATIENT)
Dept: SURGERY | Age: 56
End: 2021-09-23

## 2021-09-23 NOTE — TELEPHONE ENCOUNTER
Spoke to MD and called patient back. Explained to patient that the MD stated she needs a diagnostic mammogram because she palpated an area and wants to investigate the area. Patient states that she is only due for a screening mammogram. I also explained to the patient that if she goes in for her screening mammogram and they find an area, they will want to do a diagnostic mammogram and US of the area and that her orders are already in the system for that. Patient again stats that because of us putting in her chart that she has a problem that it will be there from now on and she just needs to get her screening mammogram. Attempted to let patient know that she can have the screening mammogram completed but they will not be able to use the US unless she gets a diagnostic mammogram completed. Patient kept talking over me and at the end of the conversation before hanging the phone up the patient said, well for all the trouble your office has put me through, I hope they find cancer and hung up the phone.     Information was relayed back to the MD.

## 2021-09-23 NOTE — TELEPHONE ENCOUNTER
Patient called to schedule her mammogram and was wanting her order change from a diagnostic mammogram to a screening mammogram.    Explained to patient that the MD note and order match for a diagnostic mammogram. Patient asked for it to be changed. Explained to patient that I will need to ask the MD ans that she was with a patient and we would need to call her back. Confirmed patient phone number of [phone]. Patient stated she will cancel and go somewhere else if the order can not be changed.  Again explained to the patient that I can not change the doctors orders and need to speak with the MD first.

## 2021-09-24 ENCOUNTER — HOSPITAL ENCOUNTER (OUTPATIENT)
Dept: ULTRASOUND IMAGING | Age: 56
Discharge: HOME | End: 2021-09-24
Payer: COMMERCIAL

## 2021-09-24 ENCOUNTER — HOSPITAL ENCOUNTER (OUTPATIENT)
Dept: MAMMOGRAPHY | Age: 56
Discharge: HOME | End: 2021-09-24
Payer: COMMERCIAL

## 2021-09-24 VITALS — BODY MASS INDEX: 27.38 KG/M2 | WEIGHT: 145 LBS | HEIGHT: 61 IN

## 2021-09-24 DIAGNOSIS — Z15.01 BIALLELIC MUTATION OF CHEK2 GENE: ICD-10-CM

## 2021-09-24 DIAGNOSIS — Z15.09 BIALLELIC MUTATION OF CHEK2 GENE: ICD-10-CM

## 2021-09-24 DIAGNOSIS — Z91.89 AT HIGH RISK FOR BREAST CANCER: ICD-10-CM

## 2021-09-24 DIAGNOSIS — N63.0 BREAST MASS: ICD-10-CM

## 2021-09-24 PROCEDURE — G0279 TOMOSYNTHESIS, MAMMO: HCPCS

## 2021-09-24 PROCEDURE — 76642 ULTRASOUND BREAST LIMITED: CPT

## 2021-09-24 NOTE — RESULT ENCOUNTER NOTE
Imaging results reviewed with the patient over the phone. We will plan follow-up in 6 months for clinical breast exam with a breast MRI 1 to 2 days prior. Patient was given the phone number to schedule the MRI. Please call her to schedule a follow-up visit with me in 6 months.

## 2021-09-26 DIAGNOSIS — E06.3 HASHIMOTO'S DISEASE: Chronic | ICD-10-CM

## 2021-09-27 RX ORDER — ERGOCALCIFEROL 1.25 MG/1
CAPSULE ORAL
Qty: 4 CAPSULE | Refills: 0 | Status: SHIPPED | OUTPATIENT
Start: 2021-09-27 | End: 2021-10-25

## 2021-09-27 NOTE — TELEPHONE ENCOUNTER
Medication:   Requested Prescriptions     Pending Prescriptions Disp Refills    vitamin D (ERGOCALCIFEROL) 1.25 MG (89442 UT) CAPS capsule [Pharmacy Med Name: VIT D2 (ERGOCAL) 1.25MG(50,000U) CP] 4 capsule 0     Sig: TAKE ONE CAPSULE BY MOUTH ONCE WEEKLY       Last Filled:      Patient Phone Number: [phone] (home)     Last appt: 4/19/2021   Next appt: Visit date not found    Last Labs DM:   Lab Results   Component Value Date    VITD25 50.2 04/14/2021    VITD25 64.9 06/08/2020

## 2021-10-01 ENCOUNTER — OFFICE VISIT (OUTPATIENT)
Dept: INTERNAL MEDICINE CLINIC | Age: 56
End: 2021-10-01
Payer: COMMERCIAL

## 2021-10-01 VITALS
HEIGHT: 62 IN | WEIGHT: 145 LBS | BODY MASS INDEX: 26.68 KG/M2 | DIASTOLIC BLOOD PRESSURE: 68 MMHG | SYSTOLIC BLOOD PRESSURE: 122 MMHG

## 2021-10-01 DIAGNOSIS — G47.9 SLEEP DISORDER: ICD-10-CM

## 2021-10-01 DIAGNOSIS — M79.7 FIBROMYALGIA: Primary | Chronic | ICD-10-CM

## 2021-10-01 DIAGNOSIS — E78.2 MIXED HYPERLIPIDEMIA: ICD-10-CM

## 2021-10-01 PROCEDURE — G8484 FLU IMMUNIZE NO ADMIN: HCPCS | Performed by: INTERNAL MEDICINE

## 2021-10-01 PROCEDURE — 1036F TOBACCO NON-USER: CPT | Performed by: INTERNAL MEDICINE

## 2021-10-01 PROCEDURE — 99213 OFFICE O/P EST LOW 20 MIN: CPT | Performed by: INTERNAL MEDICINE

## 2021-10-01 PROCEDURE — G8417 CALC BMI ABV UP PARAM F/U: HCPCS | Performed by: INTERNAL MEDICINE

## 2021-10-01 PROCEDURE — G8427 DOCREV CUR MEDS BY ELIG CLIN: HCPCS | Performed by: INTERNAL MEDICINE

## 2021-10-01 PROCEDURE — 3017F COLORECTAL CA SCREEN DOC REV: CPT | Performed by: INTERNAL MEDICINE

## 2021-10-01 RX ORDER — GABAPENTIN 300 MG/1
CAPSULE ORAL
Qty: 60 CAPSULE | Refills: 2 | Status: SHIPPED | OUTPATIENT
Start: 2021-10-01 | End: 2021-12-27

## 2021-10-01 ASSESSMENT — ENCOUNTER SYMPTOMS
SHORTNESS OF BREATH: 0
BACK PAIN: 0
EYE REDNESS: 0
NAUSEA: 0
CHEST TIGHTNESS: 0
ABDOMINAL PAIN: 0
COUGH: 0
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
## 2021-10-23 DIAGNOSIS — E06.3 HASHIMOTO'S DISEASE: Chronic | ICD-10-CM

## 2021-10-25 RX ORDER — ERGOCALCIFEROL 1.25 MG/1
CAPSULE ORAL
Qty: 4 CAPSULE | Refills: 0 | Status: SHIPPED | OUTPATIENT
Start: 2021-10-25 | End: 2021-11-26

## 2021-10-25 NOTE — TELEPHONE ENCOUNTER
Medication:   Requested Prescriptions     Pending Prescriptions Disp Refills    vitamin D (ERGOCALCIFEROL) 1.25 MG (73169 UT) CAPS capsule [Pharmacy Med Name: VIT D2 (ERGOCAL) 1.25MG(50,000U) CP] 4 capsule 0     Sig: TAKE ONE CAPSULE BY MOUTH ONCE WEEKLY       Last Filled:      Patient Phone Number: [phone] (home)     Last appt: 4/19/2021   Next appt: Visit date not found    Last Labs DM:   Lab Results   Component Value Date    VITD25 50.2 04/14/2021    VITD25 64.9 06/08/2020

## 2021-11-25 DIAGNOSIS — E06.3 HASHIMOTO'S DISEASE: Chronic | ICD-10-CM

## 2021-11-26 RX ORDER — ERGOCALCIFEROL 1.25 MG/1
CAPSULE ORAL
Qty: 4 CAPSULE | Refills: 0 | Status: SHIPPED | OUTPATIENT
Start: 2021-11-26 | End: 2021-12-27

## 2021-11-26 NOTE — TELEPHONE ENCOUNTER
Medication:   Requested Prescriptions     Pending Prescriptions Disp Refills    vitamin D (ERGOCALCIFEROL) 1.25 MG (03125 UT) CAPS capsule [Pharmacy Med Name: VIT D2 (ERGOCAL) 1.25MG(50,000U) CP] 4 capsule 0     Sig: TAKE ONE CAPSULE BY MOUTH ONCE WEEKLY       Last Filled:      Patient Phone Number: [phone] (home)     Last appt: 4/19/2021   Next appt: Due in April 2022  Last Labs DM:   Lab Results   Component Value Date    VITD25 50.2 04/14/2021    VITD25 64.9 06/08/2020

## 2021-12-24 DIAGNOSIS — G47.9 SLEEP DISORDER: ICD-10-CM

## 2021-12-24 DIAGNOSIS — M79.7 FIBROMYALGIA: Chronic | ICD-10-CM

## 2021-12-24 DIAGNOSIS — E06.3 HASHIMOTO'S DISEASE: Chronic | ICD-10-CM

## 2021-12-27 RX ORDER — GABAPENTIN 300 MG/1
CAPSULE ORAL
Qty: 60 CAPSULE | Refills: 2 | Status: SHIPPED | OUTPATIENT
Start: 2021-12-27 | End: 2022-03-25

## 2021-12-27 RX ORDER — ERGOCALCIFEROL 1.25 MG/1
CAPSULE ORAL
Qty: 4 CAPSULE | Refills: 0 | Status: SHIPPED | OUTPATIENT
Start: 2021-12-27 | End: 2022-01-21

## 2021-12-27 NOTE — TELEPHONE ENCOUNTER
Medication:   Requested Prescriptions     Pending Prescriptions Disp Refills    vitamin D (ERGOCALCIFEROL) 1.25 MG (90238 UT) CAPS capsule [Pharmacy Med Name: VIT D2 (ERGOCAL) 1.25MG(50,000U) CP] 4 capsule 0     Sig: TAKE ONE CAPSULE BY MOUTH ONCE WEEKLY       Last Filled:      Patient Phone Number: [phone] (home)     Last appt: 4/19/2021   Next appt: due in April 2022  Last Labs DM:   Lab Results   Component Value Date    VITD25 50.2 04/14/2021    VITD25 64.9 06/08/2020

## 2021-12-27 NOTE — TELEPHONE ENCOUNTER
Orders Placed This Encounter   Medications    gabapentin (NEURONTIN) 300 MG capsule     Sig: TAKE TWO CAPSULES BY MOUTH ONCE NIGHTLY     Dispense:  60 capsule     Refill:  2     Controlled Substance Monitoring:    Acute and Chronic Pain Monitoring:   RX Monitoring 12/27/2021   Attestation -   Acute Pain Prescriptions -   Periodic Controlled Substance Monitoring No signs of potential drug abuse or diversion identified.    Chronic Pain > 80 MEDD -

## 2022-01-21 DIAGNOSIS — E06.3 HASHIMOTO'S DISEASE: Chronic | ICD-10-CM

## 2022-01-21 RX ORDER — ERGOCALCIFEROL 1.25 MG/1
CAPSULE ORAL
Qty: 4 CAPSULE | Refills: 0 | Status: SHIPPED | OUTPATIENT
Start: 2022-01-21 | End: 2022-02-21

## 2022-01-21 NOTE — TELEPHONE ENCOUNTER
Medication:   Requested Prescriptions     Pending Prescriptions Disp Refills    vitamin D (ERGOCALCIFEROL) 1.25 MG (13594 UT) CAPS capsule [Pharmacy Med Name: VIT D2 (ERGOCAL) 1.25MG(50,000U) CP] 4 capsule 0     Sig: TAKE ONE CAPSULE BY MOUTH ONCE WEEKLY       Last Filled:      Patient Phone Number: [phone] (home)     Last appt: 4/19/2021   Next appt: due in April 2022  Last Lipid:   Lab Results   Component Value Date    CHOL 175 10/09/2021    TRIG 108 10/09/2021    HDL 58 10/09/2021    HDL 61 09/16/2011    [address] 95 10/09/2021

## 2022-02-08 ENCOUNTER — TELEPHONE (OUTPATIENT)
Dept: INTERNAL MEDICINE CLINIC | Age: 57
End: 2022-02-08
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
## 2022-02-10 NOTE — TELEPHONE ENCOUNTER
[phone] (home)   Spoke with patient she states she needs something , a letter stating  She has [facility] [address] her from doing her JOB! Please call or ADVISE!  She did not work a certain period of time due to this dx: and unemployment is wanting her to pay back the (money)!!!

## 2022-02-15 ENCOUNTER — TELEPHONE (OUTPATIENT)
Dept: INTERNAL MEDICINE CLINIC | Age: 57
End: 2022-02-15

## 2022-02-15 NOTE — TELEPHONE ENCOUNTER
Pt called back into the office to receive an update on her letter for unemployment. Pt has a due date of 2/23/22. Please advise and call patient.

## 2022-02-15 NOTE — LETTER
[facility] [address]  [address], [address] [place] [address]  Phone: [phone]  Fax: [phone]    Brent King MD        February 24, 2022     Patient: Adriane Hadley   YOB: 1965   Date of Visit: 2/15/2022       To Whom It May Concern: It is my medical opinion that Pawan Wilcox has chronic neck and back pain, chronic fibromyalgia, and is status post colon resection for rectal cancer. 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 Prasanth Thurston is only capable of working in a sedentary position on a regular basis. If you have any questions or concerns, please don't hesitate to call.     Sincerely,        Brent King MD

## 2022-02-17 ENCOUNTER — OFFICE VISIT (OUTPATIENT)
Dept: INTERNAL MEDICINE CLINIC | Age: 57
End: 2022-02-17
Payer: COMMERCIAL

## 2022-02-17 VITALS
HEIGHT: 62 IN | HEART RATE: 66 BPM | BODY MASS INDEX: 26.65 KG/M2 | TEMPERATURE: 98.5 F | WEIGHT: 144.8 LBS | OXYGEN SATURATION: 98 % | SYSTOLIC BLOOD PRESSURE: 110 MMHG | DIASTOLIC BLOOD PRESSURE: 70 MMHG

## 2022-02-17 DIAGNOSIS — F41.9 ANXIETY: ICD-10-CM

## 2022-02-17 DIAGNOSIS — G89.29 CHRONIC NECK PAIN: ICD-10-CM

## 2022-02-17 DIAGNOSIS — M79.7 FIBROMYALGIA: Primary | ICD-10-CM

## 2022-02-17 DIAGNOSIS — G89.29 CHRONIC MIDLINE LOW BACK PAIN WITHOUT SCIATICA: ICD-10-CM

## 2022-02-17 DIAGNOSIS — M54.50 CHRONIC MIDLINE LOW BACK PAIN WITHOUT SCIATICA: ICD-10-CM

## 2022-02-17 DIAGNOSIS — M54.2 CHRONIC NECK PAIN: ICD-10-CM

## 2022-02-17 PROCEDURE — 1036F TOBACCO NON-USER: CPT | Performed by: INTERNAL MEDICINE

## 2022-02-17 PROCEDURE — 3017F COLORECTAL CA SCREEN DOC REV: CPT | Performed by: INTERNAL MEDICINE

## 2022-02-17 PROCEDURE — 99213 OFFICE O/P EST LOW 20 MIN: CPT | Performed by: INTERNAL MEDICINE

## 2022-02-17 PROCEDURE — G8484 FLU IMMUNIZE NO ADMIN: HCPCS | Performed by: INTERNAL MEDICINE

## 2022-02-17 PROCEDURE — G8427 DOCREV CUR MEDS BY ELIG CLIN: HCPCS | Performed by: INTERNAL MEDICINE

## 2022-02-17 PROCEDURE — G8417 CALC BMI ABV UP PARAM F/U: HCPCS | Performed by: INTERNAL MEDICINE

## 2022-02-17 RX ORDER — CLONAZEPAM 0.5 MG/1
TABLET ORAL
Qty: 60 TABLET | Refills: 0 | Status: SHIPPED | OUTPATIENT
Start: 2022-02-17 | End: 2023-11-04

## 2022-02-17 ASSESSMENT — PATIENT HEALTH QUESTIONNAIRE - PHQ9
8. MOVING OR SPEAKING SO SLOWLY THAT OTHER PEOPLE COULD HAVE NOTICED. OR THE OPPOSITE, BEING SO FIGETY OR RESTLESS THAT YOU HAVE BEEN MOVING AROUND A LOT MORE THAN USUAL: 1
1. LITTLE INTEREST OR PLEASURE IN DOING THINGS: 1
SUM OF ALL RESPONSES TO PHQ QUESTIONS 1-9: 10
2. FEELING DOWN, DEPRESSED OR HOPELESS: 1
4. FEELING TIRED OR HAVING LITTLE ENERGY: 2
9. THOUGHTS THAT YOU WOULD BE BETTER OFF DEAD, OR OF HURTING YOURSELF: 0
7. TROUBLE CONCENTRATING ON THINGS, SUCH AS READING THE NEWSPAPER OR WATCHING TELEVISION: 0
SUM OF ALL RESPONSES TO PHQ QUESTIONS 1-9: 10
3. TROUBLE FALLING OR STAYING ASLEEP: 2
6. FEELING BAD ABOUT YOURSELF - OR THAT YOU ARE A FAILURE OR HAVE LET YOURSELF OR YOUR FAMILY DOWN: 1
5. POOR APPETITE OR OVEREATING: 2
SUM OF ALL RESPONSES TO PHQ QUESTIONS 1-9: 10
SUM OF ALL RESPONSES TO PHQ9 QUESTIONS 1 & 2: 2
SUM OF ALL RESPONSES TO PHQ QUESTIONS 1-9: 10

## 2022-02-17 ASSESSMENT — COLUMBIA-SUICIDE SEVERITY RATING SCALE - C-SSRS
6. HAVE YOU EVER DONE ANYTHING, STARTED TO DO ANYTHING, OR PREPARED TO DO ANYTHING TO END YOUR LIFE?: NO
1. WITHIN THE PAST MONTH, HAVE YOU WISHED YOU WERE DEAD OR WISHED YOU COULD GO TO SLEEP AND NOT WAKE UP?: NO
2. HAVE YOU ACTUALLY HAD ANY THOUGHTS OF KILLING YOURSELF?: NO

## 2022-02-17 NOTE — Clinical Note
[facility] [address]  [address] [address]  Phone: [phone]  Fax: [phone]    Rudy Davis MD        February 21, 2022     Patient: Giovany Bolanos   YOB: 1965   Date of Visit: 2/17/2022       To Whom It May Concern: It is my medical opinion that Blanca Leyva {Work release (duty restriction):68712}. If you have any questions or concerns, please don't hesitate to call.     Sincerely,        Rudy Davis MD
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
## 2022-02-17 NOTE — LETTER
[facility] [address]  [address], [address] [place] [address]  Phone: [phone]  Fax: [phone]    Marco Almanza MD        February 21, 2022     Patient: Semaj Munguia   YOB: 1965   Date of Visit: 2/17/2022       To Whom It May Concern: It is my medical opinion that Marilee Tapia Has chronic neck and back pain, chronic fibromyalgia, and is status post colon resection for rectal cancer. Salina Jarvis has not able to work full-time at a job with strenuous physical labor from 2017 to the present. Salina Jarvis is only capable of working in a sedentary position for maximum of 40 hours/week. If you have any questions or concerns, please don't hesitate to call.     Sincerely,        Marco Almanza MD

## 2022-02-20 DIAGNOSIS — E06.3 HASHIMOTO'S DISEASE: Chronic | ICD-10-CM

## 2022-02-21 RX ORDER — ERGOCALCIFEROL 1.25 MG/1
CAPSULE ORAL
Qty: 4 CAPSULE | Refills: 0 | Status: SHIPPED | OUTPATIENT
Start: 2022-02-21 | End: 2022-03-22

## 2022-02-21 ASSESSMENT — ENCOUNTER SYMPTOMS
SHORTNESS OF BREATH: 0
CHEST TIGHTNESS: 0
COUGH: 0
BACK PAIN: 1
NAUSEA: 0
EYE REDNESS: 0
ABDOMINAL PAIN: 0

## 2022-02-21 NOTE — TELEPHONE ENCOUNTER
Medication:   Requested Prescriptions     Pending Prescriptions Disp Refills    vitamin D (ERGOCALCIFEROL) 1.25 MG (67516 UT) CAPS capsule [Pharmacy Med Name: VIT D2 (ERGOCAL) 1.25MG(50,000U) CP] 4 capsule 0     Sig: TAKE ONE CAPSULE BY MOUTH ONCE WEEKLY       Last Filled:      Patient Phone Number: [phone] (home)     Last appt: 4/19/2021   Next appt: Due in April  Last Labs DM:   Lab Results   Component Value Date    VITD25 50.2 04/14/2021    VITD25 64.9 06/08/2020

## 2022-02-22 NOTE — TELEPHONE ENCOUNTER
Patient called in and said date on letter should say 2019 and not 2017    Patient said can you take the line out that says she cant work more than 40 hrs of the sedentary work     Please advise and call

## 2022-03-22 DIAGNOSIS — E06.3 HASHIMOTO'S DISEASE: Chronic | ICD-10-CM

## 2022-03-22 RX ORDER — ERGOCALCIFEROL 1.25 MG/1
CAPSULE ORAL
Qty: 4 CAPSULE | Refills: 0 | Status: SHIPPED | OUTPATIENT
Start: 2022-03-22 | End: 2022-04-15

## 2022-03-22 NOTE — TELEPHONE ENCOUNTER
Medication:   Requested Prescriptions     Pending Prescriptions Disp Refills    vitamin D (ERGOCALCIFEROL) 1.25 MG (22972 UT) CAPS capsule [Pharmacy Med Name: VIT D2 (ERGOCAL) 1.25MG(50,000U) CP] 4 capsule 0     Sig: TAKE ONE CAPSULE BY MOUTH ONCE WEEKLY       Last Filled:      Patient Phone Number: [phone] (home)     Last appt: 4/19/2021   Next appt: Due in April    Last Labs DM:   Lab Results   Component Value Date    VITD25 50.2 04/14/2021    VITD25 64.9 06/08/2020

## 2022-03-24 DIAGNOSIS — G47.9 SLEEP DISORDER: ICD-10-CM

## 2022-03-24 DIAGNOSIS — M79.7 FIBROMYALGIA: Chronic | ICD-10-CM

## 2022-03-25 RX ORDER — GABAPENTIN 300 MG/1
CAPSULE ORAL
Qty: 60 CAPSULE | Refills: 2 | Status: SHIPPED | OUTPATIENT
Start: 2022-03-25 | End: 2022-06-23

## 2022-04-11 ENCOUNTER — HOSPITAL ENCOUNTER (OUTPATIENT)
Dept: ULTRASOUND IMAGING | Age: 57
Discharge: HOME | End: 2022-04-11
Payer: COMMERCIAL

## 2022-04-11 DIAGNOSIS — E06.3 HASHIMOTO'S DISEASE: Chronic | ICD-10-CM

## 2022-04-11 DIAGNOSIS — E04.1 THYROID NODULE: ICD-10-CM

## 2022-04-11 PROCEDURE — 76536 US EXAM OF HEAD AND NECK: CPT

## 2022-04-15 DIAGNOSIS — E06.3 HASHIMOTO'S DISEASE: Chronic | ICD-10-CM

## 2022-04-15 DIAGNOSIS — E04.1 THYROID NODULE: ICD-10-CM

## 2022-04-15 LAB
TSH REFLEX: 1.54 UIU/ML (ref 0.27–4.2)
VITAMIN D 25-HYDROXY: 54.1 NG/ML

## 2022-04-15 RX ORDER — ERGOCALCIFEROL 1.25 MG/1
CAPSULE ORAL
Qty: 4 CAPSULE | Refills: 0 | Status: SHIPPED | OUTPATIENT
Start: 2022-04-15 | End: 2022-05-18

## 2022-04-15 NOTE — TELEPHONE ENCOUNTER
Medication:   Requested Prescriptions     Pending Prescriptions Disp Refills    vitamin D (ERGOCALCIFEROL) 1.25 MG (19009 UT) CAPS capsule [Pharmacy Med Name: VIT D2 (ERGOCAL) 1.25MG(50,000U) CP] 4 capsule 0     Sig: TAKE ONE CAPSULE BY MOUTH ONCE WEEKLY       Last Filled:      Patient Phone Number: [phone] (home)     Last appt: 4/19/2021   Next appt: due now    Last Labs DM:   Lab Results   Component Value Date    VITD25 50.2 04/14/2021    VITD25 64.9 06/08/2020

## 2022-05-06 ENCOUNTER — HOSPITAL ENCOUNTER (OUTPATIENT)
Dept: MRI IMAGING | Age: 57
Discharge: HOME | End: 2022-05-06
Payer: COMMERCIAL

## 2022-05-06 DIAGNOSIS — Z15.89 GENE MUTATION: ICD-10-CM

## 2022-05-06 PROCEDURE — 6360000004 HC RX CONTRAST MEDICATION: Performed by: OBSTETRICS & GYNECOLOGY

## 2022-05-06 PROCEDURE — 77049 MRI BREAST C-+ W/CAD BI: CPT

## 2022-05-06 PROCEDURE — A9579 GAD-BASE MR CONTRAST NOS,1ML: HCPCS | Performed by: OBSTETRICS & GYNECOLOGY

## 2022-05-06 RX ADMIN — GADOTERIDOL 14 ML: 279.3 INJECTION, SOLUTION INTRAVENOUS at 15:35

## 2022-05-18 DIAGNOSIS — E06.3 HASHIMOTO'S DISEASE: Chronic | ICD-10-CM

## 2022-05-18 RX ORDER — ERGOCALCIFEROL 1.25 MG/1
CAPSULE ORAL
Qty: 4 CAPSULE | Refills: 0 | Status: SHIPPED | OUTPATIENT
Start: 2022-05-18 | End: 2022-05-23 | Stop reason: SDUPTHER

## 2022-05-18 NOTE — TELEPHONE ENCOUNTER
Medication:   Requested Prescriptions     Pending Prescriptions Disp Refills    vitamin D (ERGOCALCIFEROL) 1.25 MG (82592 UT) CAPS capsule [Pharmacy Med Name: VIT D2 (ERGOCAL) 1.25MG(50,000U) CP] 4 capsule 0     Sig: TAKE ONE CAPSULE BY MOUTH ONCE WEEKLY       Last Filled:      Patient Phone Number: [phone] (home)     Last appt: 4/19/2021   Next appt: 05/23/2022    Last Labs DM:   Lab Results   Component Value Date    VITD25 54.1 04/15/2022    VITD25 50.2 04/14/2021

## 2022-05-23 ENCOUNTER — OFFICE VISIT (OUTPATIENT)
Dept: ENDOCRINOLOGY | Age: 57
End: 2022-05-23
Payer: COMMERCIAL

## 2022-05-23 VITALS
BODY MASS INDEX: 27.05 KG/M2 | HEART RATE: 62 BPM | HEIGHT: 62 IN | RESPIRATION RATE: 14 BRPM | WEIGHT: 147 LBS | TEMPERATURE: 98 F | OXYGEN SATURATION: 98 %

## 2022-05-23 DIAGNOSIS — E04.1 THYROID NODULE: Primary | ICD-10-CM

## 2022-05-23 DIAGNOSIS — E06.3 HASHIMOTO'S DISEASE: Chronic | ICD-10-CM

## 2022-05-23 DIAGNOSIS — E55.9 VITAMIN D DEFICIENCY: ICD-10-CM

## 2022-05-23 PROCEDURE — 3017F COLORECTAL CA SCREEN DOC REV: CPT | Performed by: INTERNAL MEDICINE

## 2022-05-23 PROCEDURE — 1036F TOBACCO NON-USER: CPT | Performed by: INTERNAL MEDICINE

## 2022-05-23 PROCEDURE — 99214 OFFICE O/P EST MOD 30 MIN: CPT | Performed by: INTERNAL MEDICINE

## 2022-05-23 PROCEDURE — G8417 CALC BMI ABV UP PARAM F/U: HCPCS | Performed by: INTERNAL MEDICINE

## 2022-05-23 PROCEDURE — G8427 DOCREV CUR MEDS BY ELIG CLIN: HCPCS | Performed by: INTERNAL MEDICINE

## 2022-05-23 RX ORDER — LEVOTHYROXINE SODIUM 0.05 MG/1
TABLET ORAL
Qty: 30 TABLET | Refills: 12 | Status: SHIPPED | OUTPATIENT
Start: 2022-05-23 | End: 2022-06-01

## 2022-05-23 RX ORDER — ERGOCALCIFEROL 1.25 MG/1
CAPSULE ORAL
Qty: 6 CAPSULE | Refills: 8 | Status: SHIPPED | OUTPATIENT
Start: 2022-05-23

## 2022-05-23 NOTE — PROGRESS NOTES
Subjective:      50  y.o WF who is here for thyroid evaluation. Interim:     Stable  No issues    Initial complaints: Fatigue improved ,  sleep problems,    muscle and joint pain,  Anxiety improved,  confusion,cold intolerance, brain fog  History of obstructive symptoms:  difficulty swallowing No, changes in voice/hoarseness  No.    History of radiation to patient's neck:   No  Recent iodine exposure:  No  Family history includes mom and sister hypothyroidism. Family history of thyroid cancer:  No    Current smoking of cigarettes or cigars: No    Hypothyroidism for year    Mild controlled    TFT:  10/15 TSH 2.72 Vit D 23.8   6/15 TSH 2.18  Vitamin D 27  4/15 TSH 2.04 TPO 8 Vit D 12.4 start on vitamin D Replacement also taking D3 1000daily  11/14 TSH 2.42 increased to 50mcg  7/14  TSH 3.22 FT4 1.1  11/13 TSH 2.04 Free level normal  6/13  TSH 6.8  FT4  0.9 LTX 25mcg  6/13  TSH 3.75 FT4 0.7  3/13  TSH 3.5 FT4 0.85  3/13  TSH 6.03 FT4 0.77  9/11  TSH 3.53    She has thyroid nodules    Thyroid ultrasound 6/13     The right thyroid lobe is 3.5 x 1.3 x 1.3 cm in size. Left is 3.3   x 1.4 x 1.0 cm in size. Isthmus is 5 mm in thickness. Thyroid gland is mildly inhomogeneous. Two hypoechoic nodules are   demonstrated within the mid to upper right lobe, 9 x 6 mm and 8 x   6 mm. IMPRESSION:   2 nonspecific right thyroid hypoechoic nodules. Further   evaluation versus continued short-term followup would be   suggested. 11/14 Thyroid Ultrasound:  Right thyroid lobe measures 1.5 x 1.2 x 3.8 cm   Left lobe measures 1.3 x 0.9 x 3.4 cm. Two subtle nodules in the right thyroid lobe measure 9 x 6 x 5 mm   and 5 x 5 x 4 mm, without significant interval change. No   discrete nodule is present within the left lobe or within the   isthmus. Small benign-appearing lymph node is located inferior to   the left lobe with central fatty hilum and normal cortical   thickness, measuring under 2 mm.     Stable    4/19 Stable nodules 6 and 5mm    She had worsening symptoms after being off SSRI. She is taking klonipin currently. Started on levothyroxine 25mcg initially ,   Cortisol 8.7     TSH 2.38  Vit D 41  Levothyroxine 50mcg    On Vit D 50,000IU twice a week   TSH 3.17   Vit D 52 on    50,000 IU weekly   TSH 1.74 Vit D 55.5   Vit D 64 TSH  1.45 A1c 5.5   TSH 1.85 Vit D 50    Vit D every 2 weeks   TSH 1.54  Vit D 52    Past Medical History:   Diagnosis Date    Back pain     Chicken pox     Colon cancer ([facility] [address]     Depression     NO MEDS NOW OKAY    Fibromyalgia     Gastric ulcer, unspecified as acute or chronic, without mention of hemorrhage, perforation, or obstruction     Gene mutation     chek 22-needs breast MRI    GERD (gastroesophageal reflux disease)     Insomnia     Sleep study 1992:dxed w/interuption onset insomnia    Marijuana use     no further controlled substances while using marijuana.  Mixed hyperlipidemia 10/17/2011    SLIGHT ELEVATION NO MEDS    Mononucleosis     Panic attacks     Pap smear 2010;10/17/11;10/2012    Nml. Dr. Maddox(prior gyn)    Rectal cancer [facility]) 2017    Screening mammogram 2010;2011;2013    Nml.      Sleep apnea     DOESN'T KNOW SETTINGS    Subclinical hypothyroidism 2013    Thyroid nodules:Right 2013    Wheezing 2013     Past Surgical History:   Procedure Laterality Date     SECTION      CHOLECYSTECTOMY N/A 10/13/2015   [facility] DENTAL SURGERY      ENDOSCOPY, COLON, DIAGNOSTIC      LEFT COLECTOMY  2017    Nyla Butt MD, rectal cancer     OTHER SURGICAL HISTORY  10/13/15    LAPAROSCOPIC CHOLECYSTECTOMY    PILONIDAL CYST EXCISION      TONSILLECTOMY      UPPER GASTROINTESTINAL ENDOSCOPY N/A 2019    EGD BIOPSY performed [facility] Susanne Sun [facility] at [facility] ENDOSCOPY     Current Outpatient Medications   Medication Sig Dispense Refill    vitamin D (ERGOCALCIFEROL) 1.25 MG (85554 UT) CAPS capsule TAKE ONE CAPSULE BY MOUTH ONCE WEEKLY 4 capsule 0    gabapentin (NEURONTIN) 300 MG capsule TAKE 2 CAPSULES BY MOUTH ONCE NIGHTLY 60 capsule 2    clonazePAM (KLONOPIN) 0.5 MG tablet TAKE ONE TABLET BY MOUTH TWICE A DAY AS NEEDED 60 tablet 0    levothyroxine (SYNTHROID) 50 MCG tablet TAKE ONE TABLET BY MOUTH DAILY 30 tablet 8    Naproxen Sodium (ALEVE PO) Take by mouth      tiZANidine (ZANAFLEX) 2 MG tablet TAKE ONE TABLET BY MOUTH THREE TIMES A DAY AS NEEDED FOR MUSCLE SPASM 30 tablet 1    bismuth subsalicylate (PEPTO-BISMOL) 262 MG chewable tablet Take 2 tablets by mouth       No current facility-administered medications for this visit. Review of Systems  Scanned, reviewed    Vitals:    05/23/22 1037   Pulse: 62   Resp: 14   Temp: 98 °F (36.7 °C)   SpO2: 98%       Constitutional: Well-developed, appears stated age, cooperative, in no acute distress  H/E/N/M/T:atraumatic, normocephalic, external ears, nose, lips normal without lesions  Eyes: Lids, lashes, conjunctivae and sclerae normal, No proptosis, no redness  Neck: supple, symmetrical, no swelling  Skin: No obvious rashes or lesions present. Skin and hair texture normal  Psychiatric: Judgement and Insight:  judgement and insight appear normal  Neuro: Normal without focal findings, speech is normal normal, speech is spontaneous  Chest: No labored breathing, no chest deformity, no stridor  Musculoskeletal: No joint deformity, swelling      Lab Review  Lab Results   Component Value Date    TSH 3.12 02/22/2018     No results found for: FREET4      Assessment: 1. Subclinical Hypothyroidism:                         She had a high TSH with low normal FT4 indicative of subclinical hypothyroidism. TPO antibodies are normal, but reviewed images, heterogenous gland indicative of Hashimotos.   I discussed with her that with her levels, I do no think her anxiety is due to her thyroid, given her fatigue, weakness, cold intolerance,  started on levothyroxine, now biochemically euthyroid. She had question about T3, told her level was normal.Advised symptoms due to stress/depression  Feels better on levothyroxine    2. Thyroid Nodules: Reviewed images, two right sided sub centimeter nodules, ill defined, pseudo nodules? Recommend to monitor. USG 5/19 shows stable 5mm and 6mm nodule  No nodule 4/22    3. Anxiety/Depression: On klonipin as needed, does not want anti depressant, follow PCP, stress is better     4. Vitamin D deficiency: On replacement, taking weekly, level improved , continue, changed dose to every 2 weeks    Plan: 1. Continue levothyroxine,  50mcg one tab daily,  vitamin D every 2 weeks  2. Check TFT and vitamin D before next visit  3. Thyroid Nodule f/u clinically   4. F/u in one year

## 2022-06-01 DIAGNOSIS — E06.3 HASHIMOTO'S DISEASE: Chronic | ICD-10-CM

## 2022-06-01 RX ORDER — LEVOTHYROXINE SODIUM 0.05 MG/1
TABLET ORAL
Qty: 30 TABLET | Refills: 12 | Status: SHIPPED | OUTPATIENT
Start: 2022-06-01

## 2022-06-01 NOTE — TELEPHONE ENCOUNTER
Medication:   Requested Prescriptions     Pending Prescriptions Disp Refills    levothyroxine (SYNTHROID) 50 MCG tablet [Pharmacy Med Name: LEVOTHYROXINE 50 MCG TABLET] 30 tablet 12     Sig: TAKE ONE TABLET BY MOUTH DAILY       Last Filled:      Patient Phone Number: [phone] (home)     Last appt: 5/23/2022   Next appt: Visit date not found    Last Thyroid:   Lab Results   Component Value Date    TSH 3.12 02/22/2018    FT3 3.1 07/29/2014    T4FREE 1.3 02/22/2018

## 2022-06-22 DIAGNOSIS — M79.7 FIBROMYALGIA: Chronic | ICD-10-CM

## 2022-06-22 DIAGNOSIS — G47.9 SLEEP DISORDER: ICD-10-CM

## 2022-06-23 RX ORDER — GABAPENTIN 300 MG/1
CAPSULE ORAL
Qty: 60 CAPSULE | Refills: 1 | Status: SHIPPED | OUTPATIENT
Start: 2022-06-23 | End: 2022-08-18

## 2022-06-23 NOTE — TELEPHONE ENCOUNTER
Patient called in requesting refill for the following medication:    gabapentin (NEURONTIN) 300 MG capsule      [address] [id number] - [place], [address]  [place] [phone]    Pls advise.

## 2022-07-11 ENCOUNTER — TELEPHONE (OUTPATIENT)
Dept: ORTHOPEDIC SURGERY | Age: 57
End: 2022-07-11

## 2022-07-11 NOTE — TELEPHONE ENCOUNTER
Imported medical / PT records for 8/29/2019 to present into [facility] for [facility]

## 2022-08-18 DIAGNOSIS — G47.9 SLEEP DISORDER: ICD-10-CM

## 2022-08-18 DIAGNOSIS — M79.7 FIBROMYALGIA: Chronic | ICD-10-CM

## 2022-08-18 RX ORDER — GABAPENTIN 300 MG/1
CAPSULE ORAL
Qty: 60 CAPSULE | Refills: 1 | Status: SHIPPED | OUTPATIENT
Start: 2022-08-18 | End: 2022-10-18

## 2022-08-18 NOTE — TELEPHONE ENCOUNTER
Orders Placed This Encounter   Medications    gabapentin (NEURONTIN) 300 MG capsule     Sig: TAKE TWO CAPSULES BY MOUTH ONCE NIGHTLY     Dispense:  60 capsule     Refill:  1         Controlled Substance Monitoring:    Acute and Chronic Pain Monitoring:   RX Monitoring 8/18/2022   Attestation -   Acute Pain Prescriptions -   Periodic Controlled Substance Monitoring No signs of potential drug abuse or diversion identified. Chronic Pain > 50 MEDD Re-evaluated the status of the patient's underlying condition causing pain.    Chronic Pain > 80 MEDD -

## 2022-08-22 ENCOUNTER — OFFICE VISIT (OUTPATIENT)
Dept: INTERNAL MEDICINE CLINIC | Age: 57
End: 2022-08-22
Payer: COMMERCIAL

## 2022-08-22 VITALS
HEART RATE: 64 BPM | DIASTOLIC BLOOD PRESSURE: 70 MMHG | WEIGHT: 149.6 LBS | BODY MASS INDEX: 27.53 KG/M2 | TEMPERATURE: 97.1 F | OXYGEN SATURATION: 98 % | SYSTOLIC BLOOD PRESSURE: 100 MMHG | HEIGHT: 62 IN

## 2022-08-22 DIAGNOSIS — G47.33 OBSTRUCTIVE SLEEP APNEA: Primary | ICD-10-CM

## 2022-08-22 PROCEDURE — G8417 CALC BMI ABV UP PARAM F/U: HCPCS | Performed by: INTERNAL MEDICINE

## 2022-08-22 PROCEDURE — 1036F TOBACCO NON-USER: CPT | Performed by: INTERNAL MEDICINE

## 2022-08-22 PROCEDURE — 99213 OFFICE O/P EST LOW 20 MIN: CPT | Performed by: INTERNAL MEDICINE

## 2022-08-22 PROCEDURE — 3017F COLORECTAL CA SCREEN DOC REV: CPT | Performed by: INTERNAL MEDICINE

## 2022-08-22 PROCEDURE — G8427 DOCREV CUR MEDS BY ELIG CLIN: HCPCS | Performed by: INTERNAL MEDICINE

## 2022-08-22 RX ORDER — METHYLPREDNISOLONE 4 MG/1
4 TABLET ORAL SEE ADMIN INSTRUCTIONS
COMMUNITY
Start: 2022-08-17 | End: 2022-08-24

## 2022-08-22 SDOH — ECONOMIC STABILITY: FOOD INSECURITY: WITHIN THE PAST 12 MONTHS, YOU WORRIED THAT YOUR FOOD WOULD RUN OUT BEFORE YOU GOT MONEY TO BUY MORE.: NEVER TRUE

## 2022-08-22 SDOH — ECONOMIC STABILITY: FOOD INSECURITY: WITHIN THE PAST 12 MONTHS, THE FOOD YOU BOUGHT JUST DIDN'T LAST AND YOU DIDN'T HAVE MONEY TO GET MORE.: NEVER TRUE

## 2022-08-22 ASSESSMENT — PATIENT HEALTH QUESTIONNAIRE - PHQ9
SUM OF ALL RESPONSES TO PHQ QUESTIONS 1-9: 18
SUM OF ALL RESPONSES TO PHQ QUESTIONS 1-9: 18
9. THOUGHTS THAT YOU WOULD BE BETTER OFF DEAD, OR OF HURTING YOURSELF: 0
5. POOR APPETITE OR OVEREATING: 2
6. FEELING BAD ABOUT YOURSELF - OR THAT YOU ARE A FAILURE OR HAVE LET YOURSELF OR YOUR FAMILY DOWN: 1
4. FEELING TIRED OR HAVING LITTLE ENERGY: 3
3. TROUBLE FALLING OR STAYING ASLEEP: 3
2. FEELING DOWN, DEPRESSED OR HOPELESS: 3
SUM OF ALL RESPONSES TO PHQ QUESTIONS 1-9: 18
SUM OF ALL RESPONSES TO PHQ9 QUESTIONS 1 & 2: 6
SUM OF ALL RESPONSES TO PHQ QUESTIONS 1-9: 18
1. LITTLE INTEREST OR PLEASURE IN DOING THINGS: 3
7. TROUBLE CONCENTRATING ON THINGS, SUCH AS READING THE NEWSPAPER OR WATCHING TELEVISION: 1
8. MOVING OR SPEAKING SO SLOWLY THAT OTHER PEOPLE COULD HAVE NOTICED. OR THE OPPOSITE, BEING SO FIGETY OR RESTLESS THAT YOU HAVE BEEN MOVING AROUND A LOT MORE THAN USUAL: 2
10. IF YOU CHECKED OFF ANY PROBLEMS, HOW DIFFICULT HAVE THESE PROBLEMS MADE IT FOR YOU TO DO YOUR WORK, TAKE CARE OF THINGS AT HOME, OR GET ALONG WITH OTHER PEOPLE: 1

## 2022-08-22 ASSESSMENT — ENCOUNTER SYMPTOMS
COUGH: 0
NAUSEA: 0
EYE REDNESS: 0
BACK PAIN: 0
CHEST TIGHTNESS: 0
ABDOMINAL PAIN: 0
SHORTNESS OF BREATH: 0

## 2022-08-22 ASSESSMENT — SOCIAL DETERMINANTS OF HEALTH (SDOH): HOW HARD IS IT FOR YOU TO PAY FOR THE VERY BASICS LIKE FOOD, HOUSING, MEDICAL CARE, AND HEATING?: SOMEWHAT HARD

## 2022-08-22 NOTE — PROGRESS NOTES
Subjective:      Patient ID: Jermaine Hill is a 56 y.o. female    Chief Complaint   Patient presents with    Medication Refill     Gabapentin        HPI    ETTA  Restless legs , regular gabapentin use , doing well, no diversion, refills on time     Current Outpatient Medications on File Prior to Visit   Medication Sig Dispense Refill    methylPREDNISolone (MEDROL DOSEPACK) 4 MG tablet Take 4 mg by mouth See Admin Instructions      gabapentin (NEURONTIN) 300 MG capsule TAKE TWO CAPSULES BY MOUTH ONCE NIGHTLY 60 capsule 1    levothyroxine (SYNTHROID) 50 MCG tablet TAKE ONE TABLET BY MOUTH DAILY 30 tablet 12    vitamin D (ERGOCALCIFEROL) 1.25 MG (50200 UT) CAPS capsule TAKE ONE CAPSULE BY MOUTH every 2 weeks 6 capsule 8    clonazePAM (KLONOPIN) 0.5 MG tablet TAKE ONE TABLET BY MOUTH TWICE A DAY AS NEEDED 60 tablet 0    Naproxen Sodium (ALEVE PO) Take by mouth      tiZANidine (ZANAFLEX) 2 MG tablet TAKE ONE TABLET BY MOUTH THREE TIMES A DAY AS NEEDED FOR MUSCLE SPASM 30 tablet 1    bismuth subsalicylate (PEPTO BISMOL) 262 MG chewable tablet Take 2 tablets by mouth       No current facility-administered medications on file prior to visit. Allergies   Allergen Reactions    Lamictal [Lamotrigine] Rash    Sulfa Antibiotics Swelling     AND RASH       Past Medical History:   Diagnosis Date    Back pain     Chicken pox     Colon cancer ([facility] [address]     Depression 1992    NO MEDS NOW OKAY    Fibromyalgia     Gastric ulcer, unspecified as acute or chronic, without mention of hemorrhage, perforation, or obstruction 1989    Gene mutation     chek 22-needs breast MRI    GERD (gastroesophageal reflux disease)     Insomnia 1992    Sleep study 1992:dxed w/interuption onset insomnia    Marijuana use     no further controlled substances while using marijuana. Mixed hyperlipidemia 10/17/2011    SLIGHT ELEVATION NO MEDS    Mononucleosis     Panic attacks 2013    Pap smear 7/2010;10/17/11;10/2012    Nml. Dr. Maddox(prior gyn)    Rectal cancer ([facility] [address] 2017    Screening mammogram 2010;2011;2013    Nml. Sleep apnea     DOESN'T KNOW SETTINGS    Subclinical hypothyroidism 2013    Thyroid nodules:Right 2013    Wheezing 2013     Past Surgical History:   Procedure Laterality Date     SECTION      CHOLECYSTECTOMY N/A 10/13/2015    DENTAL SURGERY      ENDOSCOPY, COLON, DIAGNOSTIC      LEFT COLECTOMY  2017    Richelle Lazo MD, rectal cancer     OTHER SURGICAL HISTORY  10/13/15    LAPAROSCOPIC CHOLECYSTECTOMY    PILONIDAL CYST EXCISION      TONSILLECTOMY      UPPER GASTROINTESTINAL ENDOSCOPY N/A 2019    EGD BIOPSY performed by Faisal Rosales MD at [address] History     Socioeconomic History    Marital status: Single     Spouse name: Not on file    Number of children: 1    Years of education: Not on file    Highest education level: Not on file   Occupational History    Not on file   Tobacco Use    Smoking status: Former     Packs/day: 1.00     Years: 30.00     Pack years: 30.00     Types: Cigarettes     Quit date: 2009     Years since quittin.1    Smokeless tobacco: Never   Vaping Use    Vaping Use: Never used   Substance and Sexual Activity    Alcohol use:  Yes     Alcohol/week: 1.0 standard drink     Types: 1 Cans of beer per week     Comment: Occasional    Drug use: No     Comment: marijuana- DAILY On 10/13/15 with mom in room states last used marijuana several months ago    Sexual activity: Yes     Partners: Male     Birth control/protection: Condom   Other Topics Concern    Not on file   Social History Narrative    Not on file     Social Determinants of Health     Financial Resource Strain: Medium Risk    Difficulty of Paying Living Expenses: Somewhat hard   Food Insecurity: No Food Insecurity    Worried About Running Out of Food in the Last Year: Never true    Ran Out of Food in the Last Year: Never true   Transportation Needs: Not on file   Physical Activity: Not on file   Stress: Not on file   Social Connections: Not on file   Intimate Partner Violence: Not on file   Housing Stability: Not on file     Family History   Problem Relation Age of Onset    High Cholesterol Mother     High Blood Pressure Father     Heart Disease Father     COPD Father     Stroke Maternal Grandfather     Thyroid Disease Sister         Hypothyroidism    Breast Cancer Paternal Aunt     Ovarian Cancer Maternal Cousin      Immunization History   Administered Date(s) Administered    COVID-19, PFIZER PURPLE top, DILUTE for use, (age 15 y+), 30mcg/0.3mL 03/30/2021, 04/20/2021, 10/22/2021    INFLUENZA, INTRADERMAL, QUADRIVALENT, PRESERVATIVE FREE 09/22/2016    Influenza 10/17/2011    Influenza Virus Vaccine 01/02/2015, 10/22/2017, 09/14/2018, 10/02/2020    Influenza, FLUARIX, FLULAVAL, (age 10 mo+) AND AFLURIA, FLUZONE (age 1 y+), PF 10/04/2019    Influenza, Intradermal, Preservative free 12/03/2015    Tdap (Boostrix, Adacel) 10/17/2011    Zoster Recombinant (Shingrix) 04/27/2018, 01/01/2019       Review of Systems   Constitutional:  Negative for fatigue, fever and unexpected weight change. HENT:  Negative for congestion and hearing loss. Eyes:  Negative for redness and visual disturbance. Respiratory:  Negative for cough, chest tightness and shortness of breath. Cardiovascular:  Negative for chest pain and leg swelling. Gastrointestinal:  Negative for abdominal pain and nausea. Endocrine: Negative for polydipsia and polyuria. Genitourinary:  Negative for dysuria and frequency. Musculoskeletal:  Negative for arthralgias, back pain and neck pain. Skin:  Negative for rash and wound. Allergic/Immunologic: Negative for environmental allergies. Neurological:  Negative for dizziness, weakness and headaches. Hematological:  Negative for adenopathy. Does not bruise/bleed easily. Psychiatric/Behavioral:  Negative for sleep disturbance. The patient is not nervous/anxious.       Objective: Physical Exam  Constitutional:       Appearance: Normal appearance. Eyes:      Extraocular Movements: Extraocular movements intact. Cardiovascular:      Rate and Rhythm: Regular rhythm. Pulmonary:      Effort: Pulmonary effort is normal.      Breath sounds: No wheezing or rales. Neurological:      Mental Status: She is alert and oriented to person, place, and time. Psychiatric:         Mood and Affect: Mood normal.     Vitals:    08/22/22 1509   BP: 100/70   Pulse: 64   Temp: 97.1 °F (36.2 °C)   SpO2: 98%       Assessment and plan       1. Obstructive sleep apnea  Stable. Continue Rx.

## 2022-09-26 ENCOUNTER — TELEPHONE (OUTPATIENT)
Dept: INTERNAL MEDICINE CLINIC | Age: 57
End: 2022-09-26

## 2022-09-26 NOTE — TELEPHONE ENCOUNTER
----- Message from Liat Bullard sent at 9/23/2022 12:53 PM EDT -----  Subject: Appointment Request    Reason for Call: Established Patient Appointment needed: Routine Pre-Op    QUESTIONS    Reason for appointment request? Available appointments did not meet   patient need     Additional Information for Provider? Patient needs an appointment for a   pre op physical prior to her surgery on 10/7 and would like an appointment   if possible first thing in the morning or late in the afternoon around 4   pm with Dr. Elvi Boone or any other provider in the office.    ---------------------------------------------------------------------------  --------------  Telma LEA  [phone]; OK to leave message on voicemail  ---------------------------------------------------------------------------  --------------  SCRIPT ANSWERS  COVID Screen: Anne Richey

## 2022-09-27 ENCOUNTER — OFFICE VISIT (OUTPATIENT)
Dept: INTERNAL MEDICINE CLINIC | Age: 57
End: 2022-09-27
Payer: COMMERCIAL

## 2022-09-27 VITALS
HEIGHT: 61 IN | WEIGHT: 150 LBS | DIASTOLIC BLOOD PRESSURE: 68 MMHG | SYSTOLIC BLOOD PRESSURE: 126 MMHG | BODY MASS INDEX: 28.32 KG/M2

## 2022-09-27 DIAGNOSIS — Z01.818 PRE-OP EXAM: Primary | ICD-10-CM

## 2022-09-27 DIAGNOSIS — Z85.038 HISTORY OF COLON CANCER: ICD-10-CM

## 2022-09-27 DIAGNOSIS — G47.33 OBSTRUCTIVE SLEEP APNEA: ICD-10-CM

## 2022-09-27 PROCEDURE — G8417 CALC BMI ABV UP PARAM F/U: HCPCS | Performed by: INTERNAL MEDICINE

## 2022-09-27 PROCEDURE — 99243 OFF/OP CNSLTJ NEW/EST LOW 30: CPT | Performed by: INTERNAL MEDICINE

## 2022-09-27 PROCEDURE — G8427 DOCREV CUR MEDS BY ELIG CLIN: HCPCS | Performed by: INTERNAL MEDICINE

## 2022-09-27 ASSESSMENT — PATIENT HEALTH QUESTIONNAIRE - PHQ9
8. MOVING OR SPEAKING SO SLOWLY THAT OTHER PEOPLE COULD HAVE NOTICED. OR THE OPPOSITE, BEING SO FIGETY OR RESTLESS THAT YOU HAVE BEEN MOVING AROUND A LOT MORE THAN USUAL: 0
SUM OF ALL RESPONSES TO PHQ QUESTIONS 1-9: 0
9. THOUGHTS THAT YOU WOULD BE BETTER OFF DEAD, OR OF HURTING YOURSELF: 0
SUM OF ALL RESPONSES TO PHQ9 QUESTIONS 1 & 2: 0
6. FEELING BAD ABOUT YOURSELF - OR THAT YOU ARE A FAILURE OR HAVE LET YOURSELF OR YOUR FAMILY DOWN: 0
10. IF YOU CHECKED OFF ANY PROBLEMS, HOW DIFFICULT HAVE THESE PROBLEMS MADE IT FOR YOU TO DO YOUR WORK, TAKE CARE OF THINGS AT HOME, OR GET ALONG WITH OTHER PEOPLE: 0
2. FEELING DOWN, DEPRESSED OR HOPELESS: 0
4. FEELING TIRED OR HAVING LITTLE ENERGY: 0
SUM OF ALL RESPONSES TO PHQ QUESTIONS 1-9: 0
3. TROUBLE FALLING OR STAYING ASLEEP: 0
SUM OF ALL RESPONSES TO PHQ QUESTIONS 1-9: 0
7. TROUBLE CONCENTRATING ON THINGS, SUCH AS READING THE NEWSPAPER OR WATCHING TELEVISION: 0
SUM OF ALL RESPONSES TO PHQ QUESTIONS 1-9: 0
1. LITTLE INTEREST OR PLEASURE IN DOING THINGS: 0
5. POOR APPETITE OR OVEREATING: 0

## 2022-09-27 NOTE — PROGRESS NOTES
Chief Complaint   Patient presents with    Pre-op Exam     Pre op, Colonoscopy, 10/7/22, Dr. Uma Magaña @ [address] Marcos is a 56 y.o. female who presents for a preoperative physical examination. She is scheduled to have colonoscopy under anesthesia done by Dr. Ap Martinez at [address] [place] [address] Drive,[facility] on 10/7/22. History of Present Illness:      Devang Gomez needs follow up exam for rectal cancer. Past Medical History:   Diagnosis Date    Back pain     Chicken pox     Colon cancer ([address]     Depression     NO MEDS NOW OKAY    Fibromyalgia     Gastric ulcer, unspecified as acute or chronic, without mention of hemorrhage, perforation, or obstruction     Gene mutation     chek 22-needs breast MRI    GERD (gastroesophageal reflux disease)     Insomnia     Sleep study 1992:dxed w/interuption onset insomnia    Marijuana use     no further controlled substances while using marijuana. Mixed hyperlipidemia 10/17/2011    SLIGHT ELEVATION NO MEDS    Mononucleosis     Panic attacks     Pap smear 2010;10/17/11;10/2012    Nml. Dr. Maddox(prior gyn)    Rectal cancer [facility]) 2017    Screening mammogram 2010;2011;2013    Nml.      Sleep apnea     DOESN'T KNOW SETTINGS    Subclinical hypothyroidism 2013    Thyroid nodules:Right 2013    Wheezing 2013        Review of patient's past surgical history indicates:     Past Surgical History:   Procedure Laterality Date     SECTION      CHOLECYSTECTOMY N/A 10/13/2015    DENTAL SURGERY      ENDOSCOPY, COLON, DIAGNOSTIC      LEFT COLECTOMY  2017    Uma Magaña MD, rectal cancer     OTHER SURGICAL HISTORY  10/13/15    LAPAROSCOPIC CHOLECYSTECTOMY    PILONIDAL CYST EXCISION      TONSILLECTOMY      UPPER GASTROINTESTINAL ENDOSCOPY N/A 2019    EGD BIOPSY performed by Radha Contreras MD at [place] [address]                                                 22 History of

## 2022-10-05 ENCOUNTER — TELEPHONE (OUTPATIENT)
Dept: ORTHOPEDIC SURGERY | Age: 57
End: 2022-10-05

## 2022-10-05 NOTE — TELEPHONE ENCOUNTER
Imported medical / PT records (ortho) for 1/1/2012 to present into O for [employer]    Record request # [id number]  Work Order # [id number]    Sent the request for billing to [facility] billing.

## 2022-10-11 ENCOUNTER — HOSPITAL ENCOUNTER (OUTPATIENT)
Dept: MAMMOGRAPHY | Age: 57
Discharge: HOME | End: 2022-10-11
Payer: COMMERCIAL

## 2022-10-11 VITALS — WEIGHT: 150 LBS | BODY MASS INDEX: 28.32 KG/M2 | HEIGHT: 61 IN

## 2022-10-11 DIAGNOSIS — Z12.31 VISIT FOR SCREENING MAMMOGRAM: ICD-10-CM

## 2022-10-11 PROCEDURE — 77063 BREAST TOMOSYNTHESIS BI: CPT

## 2022-10-17 DIAGNOSIS — M79.7 FIBROMYALGIA: Chronic | ICD-10-CM

## 2022-10-17 DIAGNOSIS — G47.9 SLEEP DISORDER: ICD-10-CM

## 2022-10-18 ENCOUNTER — TELEPHONE (OUTPATIENT)
Dept: ORTHOPEDIC SURGERY | Age: 57
End: 2022-10-18

## 2022-10-18 RX ORDER — GABAPENTIN 300 MG/1
CAPSULE ORAL
Qty: 60 CAPSULE | Refills: 1 | Status: SHIPPED | OUTPATIENT
Start: 2022-10-18 | End: 2022-11-18

## 2022-10-21 ENCOUNTER — TELEPHONE (OUTPATIENT)
Dept: ORTHOPEDIC SURGERY | Age: 57
End: 2022-10-21

## 2022-10-21 NOTE — TELEPHONE ENCOUNTER
Imported certified medical records for 1/1/2017 to present into [address] [place] [address] for [facility]     Work Order # [id number]  Record Request # [id number]

## 2022-10-21 NOTE — TELEPHONE ENCOUNTER
E-mailed the billing request to [facility].     Record Request # [id number]  Work Order # [id number]

## 2022-12-05 DIAGNOSIS — E06.3 HASHIMOTO'S DISEASE: Chronic | ICD-10-CM

## 2022-12-06 RX ORDER — ERGOCALCIFEROL 1.25 MG/1
CAPSULE ORAL
Qty: 2 CAPSULE | Refills: 5 | Status: SHIPPED | OUTPATIENT
Start: 2022-12-06

## 2022-12-06 NOTE — TELEPHONE ENCOUNTER
Medication:   Requested Prescriptions     Pending Prescriptions Disp Refills    vitamin D (ERGOCALCIFEROL) 1.25 MG (19488 UT) CAPS capsule [Pharmacy Med Name: VIT D2 (ERGOCAL) 1.25MG(50,000U) CP] 2 capsule 5     Sig: TAKE 1 CAPSULE BY MOUTH EVERY 2 WEEKS       Last Filled:      Patient Phone Number: [phone] (home)     Last appt: 5/23/2022   Next appt: Visit date not found    Last Labs DM:   Lab Results   Component Value Date/Time    VITD25 54.1 04/15/2022 02:51 PM    VITD25 50.2 04/14/2021 10:10 AM

## 2022-12-16 DIAGNOSIS — M79.7 FIBROMYALGIA: Chronic | ICD-10-CM

## 2022-12-16 DIAGNOSIS — G47.9 SLEEP DISORDER: ICD-10-CM

## 2022-12-16 RX ORDER — GABAPENTIN 300 MG/1
CAPSULE ORAL
Qty: 60 CAPSULE | Refills: 1 | Status: SHIPPED | OUTPATIENT
Start: 2022-12-16 | End: 2023-02-14

## 2022-12-16 NOTE — TELEPHONE ENCOUNTER
Orders Placed This Encounter   Medications    gabapentin (NEURONTIN) 300 MG capsule     Sig: TAKE TWO CAPSULES BY MOUTH EVERY NIGHT AT BEDTIME     Dispense:  60 capsule     Refill:  1         Controlled Substance Monitoring:    Acute and Chronic Pain Monitoring:   RX Monitoring 12/16/2022   Attestation -   Acute Pain Prescriptions -   Periodic Controlled Substance Monitoring No signs of potential drug abuse or diversion identified.    Chronic Pain > 50 MEDD -   Chronic Pain > 80 MEDD -

## 2023-01-25 NOTE — PLAN OF CARE
Problem: Nausea/Vomiting:  Goal: Absence of nausea/vomiting  Description  Absence of nausea/vomiting  6/15/2019 0412 by Janessa Dela Cruz RN  Outcome: Ongoing  Note:   Pt has been free of nausea/vomiting this shift. Able to rest well tonight, pt still wants scheduled phenergan to stay on top of the nausea. Will continue to monitor. Problem: Fluid Volume:  Goal: Signs and symptoms of dehydration will decrease  Description  Signs and symptoms of dehydration will decrease  6/15/2019 0412 by Janessa Dela Cruz RN  Outcome: Ongoing  Note:   Pt receiving NS at 100 ml/hr continuous IV. Encouraged to drink fluids. Will continue to monitor.
Problem: Nausea/Vomiting:  Goal: Absence of nausea/vomiting  Description  Absence of nausea/vomiting  Note:   She has had multiple episodes of emesis, dark green bile color. She was given phenergan, zofran, and scopalamine patch. She remains nauseous. EGD this afternoon. Will monitor. Problem: Fluid Volume:  Goal: Signs and symptoms of dehydration will decrease  Description  Signs and symptoms of dehydration will decrease  Note:   IVF at 100/hr. She remains nauseated, and NPO. She is having small formed stools. Per patient this is not unusual since surgery for rectal cancer. Voiding hazy yellow urine. Will monitor.
Diet, Regular:   DASH/TLC {Sodium & Cholesterol Restricted} (DASH) (01-22-23 @ 13:15) [Active]

## 2023-01-31 ENCOUNTER — TELEPHONE (OUTPATIENT)
Dept: INTERNAL MEDICINE CLINIC | Age: 58
End: 2023-01-31

## 2023-01-31 NOTE — TELEPHONE ENCOUNTER
Patient called, she is trying to make a med check appt as late in the day as possible. She started a new job and really doesn't want to leave early if possible. Please call her and let her know if she can get an appt at 4:00 or later. She is due for refills. ..       [facility] 21 [id number] Catarina Hernandez, [address] [facility] [facility] [phone]

## 2023-02-01 ENCOUNTER — OFFICE VISIT (OUTPATIENT)
Dept: INTERNAL MEDICINE CLINIC | Age: 58
End: 2023-02-01
Payer: COMMERCIAL

## 2023-02-01 VITALS
DIASTOLIC BLOOD PRESSURE: 70 MMHG | TEMPERATURE: 97.8 F | BODY MASS INDEX: 27.9 KG/M2 | SYSTOLIC BLOOD PRESSURE: 110 MMHG | HEIGHT: 62 IN | OXYGEN SATURATION: 96 % | WEIGHT: 151.6 LBS | HEART RATE: 63 BPM

## 2023-02-01 DIAGNOSIS — G47.33 OBSTRUCTIVE SLEEP APNEA: ICD-10-CM

## 2023-02-01 DIAGNOSIS — G25.81 RESTLESS LEGS SYNDROME (RLS): Primary | ICD-10-CM

## 2023-02-01 PROCEDURE — 3017F COLORECTAL CA SCREEN DOC REV: CPT | Performed by: INTERNAL MEDICINE

## 2023-02-01 PROCEDURE — 1036F TOBACCO NON-USER: CPT | Performed by: INTERNAL MEDICINE

## 2023-02-01 PROCEDURE — G8484 FLU IMMUNIZE NO ADMIN: HCPCS | Performed by: INTERNAL MEDICINE

## 2023-02-01 PROCEDURE — 99213 OFFICE O/P EST LOW 20 MIN: CPT | Performed by: INTERNAL MEDICINE

## 2023-02-01 PROCEDURE — G8417 CALC BMI ABV UP PARAM F/U: HCPCS | Performed by: INTERNAL MEDICINE

## 2023-02-01 PROCEDURE — G8427 DOCREV CUR MEDS BY ELIG CLIN: HCPCS | Performed by: INTERNAL MEDICINE

## 2023-02-01 SDOH — ECONOMIC STABILITY: FOOD INSECURITY: WITHIN THE PAST 12 MONTHS, YOU WORRIED THAT YOUR FOOD WOULD RUN OUT BEFORE YOU GOT MONEY TO BUY MORE.: NEVER TRUE

## 2023-02-01 SDOH — ECONOMIC STABILITY: HOUSING INSECURITY
IN THE LAST 12 MONTHS, WAS THERE A TIME WHEN YOU DID NOT HAVE A STEADY PLACE TO SLEEP OR SLEPT IN A SHELTER (INCLUDING NOW)?: NO

## 2023-02-01 SDOH — ECONOMIC STABILITY: INCOME INSECURITY: HOW HARD IS IT FOR YOU TO PAY FOR THE VERY BASICS LIKE FOOD, HOUSING, MEDICAL CARE, AND HEATING?: NOT HARD AT ALL

## 2023-02-01 SDOH — ECONOMIC STABILITY: FOOD INSECURITY: WITHIN THE PAST 12 MONTHS, THE FOOD YOU BOUGHT JUST DIDN'T LAST AND YOU DIDN'T HAVE MONEY TO GET MORE.: NEVER TRUE

## 2023-02-01 ASSESSMENT — PATIENT HEALTH QUESTIONNAIRE - PHQ9
SUM OF ALL RESPONSES TO PHQ QUESTIONS 1-9: 0
2. FEELING DOWN, DEPRESSED OR HOPELESS: 0
SUM OF ALL RESPONSES TO PHQ9 QUESTIONS 1 & 2: 0
9. THOUGHTS THAT YOU WOULD BE BETTER OFF DEAD, OR OF HURTING YOURSELF: 0
SUM OF ALL RESPONSES TO PHQ QUESTIONS 1-9: 0
1. LITTLE INTEREST OR PLEASURE IN DOING THINGS: 0
SUM OF ALL RESPONSES TO PHQ QUESTIONS 1-9: 0
8. MOVING OR SPEAKING SO SLOWLY THAT OTHER PEOPLE COULD HAVE NOTICED. OR THE OPPOSITE, BEING SO FIGETY OR RESTLESS THAT YOU HAVE BEEN MOVING AROUND A LOT MORE THAN USUAL: 0
7. TROUBLE CONCENTRATING ON THINGS, SUCH AS READING THE NEWSPAPER OR WATCHING TELEVISION: 0
SUM OF ALL RESPONSES TO PHQ QUESTIONS 1-9: 0
6. FEELING BAD ABOUT YOURSELF - OR THAT YOU ARE A FAILURE OR HAVE LET YOURSELF OR YOUR FAMILY DOWN: 0
10. IF YOU CHECKED OFF ANY PROBLEMS, HOW DIFFICULT HAVE THESE PROBLEMS MADE IT FOR YOU TO DO YOUR WORK, TAKE CARE OF THINGS AT HOME, OR GET ALONG WITH OTHER PEOPLE: 0
5. POOR APPETITE OR OVEREATING: 0
4. FEELING TIRED OR HAVING LITTLE ENERGY: 0
3. TROUBLE FALLING OR STAYING ASLEEP: 0

## 2023-02-01 ASSESSMENT — ENCOUNTER SYMPTOMS
EYE REDNESS: 0
BACK PAIN: 0
NAUSEA: 0
SHORTNESS OF BREATH: 0
CHEST TIGHTNESS: 0
COUGH: 0
ABDOMINAL PAIN: 0

## 2023-02-01 NOTE — PROGRESS NOTES
Subjective:      Patient ID: Gigi Armstrong is a 56 y.o. female    Chief Complaint   Patient presents with    Medication Check     Gabapentin       HPI    Restless legs  Rachid Mckeon has a long history of restless leg syndrome. This leads to difficulties sleeping. If she does not take something to help control the restless legs she sleeps very restlessly. The following days she is often fatigued, has poor concentration, and gives a suboptimal performance of her work. The record does not have any discrepancy or diversion issues. Patient is safe for refill of this medication. Prescription renewal at the same dose medication. Current Outpatient Medications on File Prior to Visit   Medication Sig Dispense Refill    gabapentin (NEURONTIN) 300 MG capsule TAKE TWO CAPSULES BY MOUTH EVERY NIGHT AT BEDTIME 60 capsule 1    vitamin D (ERGOCALCIFEROL) 1.25 MG (13143 UT) CAPS capsule TAKE 1 CAPSULE BY MOUTH EVERY 2 WEEKS 2 capsule 5    levothyroxine (SYNTHROID) 50 MCG tablet TAKE ONE TABLET BY MOUTH DAILY 30 tablet 12    clonazePAM (KLONOPIN) 0.5 MG tablet TAKE ONE TABLET BY MOUTH TWICE A DAY AS NEEDED 60 tablet 0    Naproxen Sodium (ALEVE PO) Take by mouth      bismuth subsalicylate (PEPTO BISMOL) 262 MG chewable tablet Take 2 tablets by mouth       No current facility-administered medications on file prior to visit. Allergies   Allergen Reactions    Lamictal [Lamotrigine] Rash    Sulfa Antibiotics Swelling     AND RASH       Past Medical History:   Diagnosis Date    Back pain     Chicken pox     [address] [address]     Depression 1992    NO MEDS NOW OKAY    Fibromyalgia     Gastric ulcer, unspecified as acute or chronic, without mention of hemorrhage, perforation, or obstruction 1989    Gene mutation     chek 22-needs breast MRI    GERD (gastroesophageal reflux disease)     Insomnia 1992    Sleep study 1992:dxed w/interuption onset insomnia    Marijuana use     no further controlled substances while using marijuana. Mixed hyperlipidemia 10/17/2011    SLIGHT ELEVATION NO MEDS    Mononucleosis     Panic attacks     Pap smear 2010;10/17/11;10/2012    Nml. Dr. Maddox(prior gyn)    Rectal cancer [facility]) 2017    Screening mammogram 2010;2011;2013    Nml. Sleep apnea     DOESN'T KNOW SETTINGS    Subclinical hypothyroidism 2013    Thyroid nodules:Right 2013    Wheezing 2013     Past Surgical History:   Procedure Laterality Date     SECTION      CHOLECYSTECTOMY N/A 10/13/2015    DENTAL SURGERY      ENDOSCOPY, COLON, DIAGNOSTIC      LEFT COLECTOMY  2017    Dickson Cervantes MD, rectal cancer     OTHER SURGICAL HISTORY  10/13/15    LAPAROSCOPIC CHOLECYSTECTOMY    PILONIDAL CYST EXCISION      TONSILLECTOMY      UPPER GASTROINTESTINAL ENDOSCOPY N/A 2019    EGD BIOPSY performed by Renato Love MD at [address] History     Socioeconomic History    Marital status: Single     Spouse name: Not on file    Number of children: 1    Years of education: Not on file    Highest education level: Not on file   Occupational History    Not on file   Tobacco Use    Smoking status: Former     Packs/day: 1.00     Years: 30.00     Pack years: 30.00     Types: Cigarettes     Quit date: 2009     Years since quittin.6    Smokeless tobacco: Never   Vaping Use    Vaping Use: Never used   Substance and Sexual Activity    Alcohol use:  Yes     Alcohol/week: 1.0 standard drink     Types: 1 Cans of beer per week     Comment: Occasional    Drug use: No     Comment: marijuana- DAILY On 10/13/15 with mom in room states last used marijuana several months ago    Sexual activity: Yes     Partners: Male     Birth control/protection: Condom   Other Topics Concern    Not on file   Social History Narrative    Not on file     Social Determinants of Health     Financial Resource Strain: Low Risk     Difficulty of Paying Living Expenses: Not hard at all   Food Insecurity: No Food Insecurity Worried About [address] [employer] in the Last Year: Never true    [address] in the Last Year: Never true   Transportation Needs: Unknown    Lack of Transportation (Medical): Not on file    Lack of Transportation (Non-Medical): No   Physical Activity: Not on file   Stress: Not on file   Social Connections: Not on file   Intimate Partner Violence: Not on file   Housing Stability: Unknown    Unable to Pay for Housing in the Last Year: Not on file    Number of Places Lived in the Last Year: Not on file    Unstable Housing in the Last Year: No     Family History   Problem Relation Age of Onset    High Cholesterol Mother     High Blood Pressure Father     Heart Disease Father     COPD Father     Stroke Maternal Grandfather     Thyroid Disease Sister         Hypothyroidism    Breast Cancer Paternal Aunt     Ovarian Cancer Maternal Cousin      Immunization History   Administered Date(s) Administered    COVID-19, PFIZER PURPLE top, DILUTE for use, (age 15 y+), 30mcg/0.3mL 03/30/2021, 04/20/2021, 10/22/2021    INFLUENZA, INTRADERMAL, QUADRIVALENT, PRESERVATIVE FREE 09/22/2016    Influenza 10/17/2011    Influenza Virus Vaccine 01/02/2015, 10/22/2017, 09/14/2018, 10/02/2020    Influenza, FLUARIX, FLULAVAL, FLUZONE (age 10 mo+) AND AFLURIA, (age 1 y+), PF, 0.5mL 10/04/2019    Influenza, Intradermal, Preservative free 12/03/2015    Tdap (Boostrix, Adacel) 10/17/2011    Zoster Recombinant (Shingrix) 04/27/2018, 01/01/2019       Review of Systems   Constitutional:  Negative for fatigue, fever and unexpected weight change. HENT:  Negative for congestion and hearing loss. Eyes:  Negative for redness and visual disturbance. Respiratory:  Negative for cough, chest tightness and shortness of breath. Cardiovascular:  Negative for chest pain and leg swelling. Gastrointestinal:  Negative for abdominal pain and nausea. Endocrine: Negative for polydipsia and polyuria. Genitourinary:  Negative for dysuria and frequency. Musculoskeletal:  Negative for arthralgias, back pain and neck pain. Skin:  Negative for rash and wound. Neurological:  Negative for dizziness, weakness and headaches. Restless legs    Hematological:  Negative for adenopathy. Does not bruise/bleed easily. Psychiatric/Behavioral:  Positive for sleep disturbance. The patient is not nervous/anxious. Objective:    Physical Exam  Eyes:      Extraocular Movements: Extraocular movements intact. Cardiovascular:      Rate and Rhythm: Normal rate and regular rhythm. Neurological:      Mental Status: She is alert and oriented to person, place, and time. Psychiatric:         Mood and Affect: Mood normal.     Vitals:    02/01/23 1607   BP: 110/70   Pulse: 63   Temp: 97.8 °F (36.6 °C)   SpO2: 96%       Assessment and plan       1. Restless legs syndrome (RLS)  Chronic restless leg syndrome. Refill medication. 2. Obstructive sleep apnea  Stable ETTA. She should follow-up with her sleep management doctor.

## 2023-02-01 NOTE — TELEPHONE ENCOUNTER
Left pt vm informing her she can come in today @ 4pm per Dr. Rhoda Gardner    If pt calls back, please schedule appt for her

## 2023-02-12 DIAGNOSIS — M79.7 FIBROMYALGIA: Chronic | ICD-10-CM

## 2023-02-12 DIAGNOSIS — G47.9 SLEEP DISORDER: ICD-10-CM

## 2023-02-13 RX ORDER — GABAPENTIN 300 MG/1
CAPSULE ORAL
Qty: 60 CAPSULE | Refills: 1 | Status: SHIPPED | OUTPATIENT
Start: 2023-02-13 | End: 2023-04-14

## 2023-04-20 DIAGNOSIS — F41.9 ANXIETY: ICD-10-CM

## 2023-04-20 RX ORDER — CLONAZEPAM 0.5 MG/1
TABLET ORAL
Qty: 60 TABLET | Refills: 2 | Status: SHIPPED | OUTPATIENT
Start: 2023-04-20 | End: 2023-05-20

## 2023-05-10 ENCOUNTER — OFFICE VISIT (OUTPATIENT)
Dept: INTERNAL MEDICINE CLINIC | Age: 58
End: 2023-05-10
Payer: COMMERCIAL

## 2023-05-10 VITALS
DIASTOLIC BLOOD PRESSURE: 70 MMHG | BODY MASS INDEX: 27.94 KG/M2 | WEIGHT: 148 LBS | HEIGHT: 61 IN | SYSTOLIC BLOOD PRESSURE: 128 MMHG

## 2023-05-10 DIAGNOSIS — R10.84 GENERALIZED ABDOMINAL PAIN: Primary | ICD-10-CM

## 2023-05-10 PROCEDURE — 3017F COLORECTAL CA SCREEN DOC REV: CPT | Performed by: INTERNAL MEDICINE

## 2023-05-10 PROCEDURE — G8427 DOCREV CUR MEDS BY ELIG CLIN: HCPCS | Performed by: INTERNAL MEDICINE

## 2023-05-10 PROCEDURE — 1036F TOBACCO NON-USER: CPT | Performed by: INTERNAL MEDICINE

## 2023-05-10 PROCEDURE — G8417 CALC BMI ABV UP PARAM F/U: HCPCS | Performed by: INTERNAL MEDICINE

## 2023-05-10 PROCEDURE — 99213 OFFICE O/P EST LOW 20 MIN: CPT | Performed by: INTERNAL MEDICINE

## 2023-05-10 ASSESSMENT — ENCOUNTER SYMPTOMS
EYE REDNESS: 0
NAUSEA: 0
VOMITING: 0
BACK PAIN: 0
SHORTNESS OF BREATH: 0
DIARRHEA: 0
ABDOMINAL PAIN: 1
COUGH: 0
ANAL BLEEDING: 0
CHEST TIGHTNESS: 0
CONSTIPATION: 0
BLOOD IN STOOL: 0
ABDOMINAL DISTENTION: 0

## 2023-05-10 NOTE — PROGRESS NOTES
bleeding, blood in stool, constipation, diarrhea, nausea and vomiting. Endocrine: Negative for polydipsia and polyuria. Genitourinary:  Negative for dysuria and frequency. Musculoskeletal:  Negative for arthralgias, back pain and neck pain. Skin:  Negative for rash and wound. Neurological:  Negative for dizziness, weakness and headaches. Hematological:  Negative for adenopathy. Does not bruise/bleed easily. Psychiatric/Behavioral:  Negative for sleep disturbance. The patient is not nervous/anxious. Objective:    Physical Exam  Constitutional:       Appearance: Normal appearance. She is well-developed. Eyes:      Extraocular Movements: Extraocular movements intact. Cardiovascular:      Rate and Rhythm: Normal rate and regular rhythm. Heart sounds: No murmur heard. Pulmonary:      Effort: Pulmonary effort is normal.      Breath sounds: Normal breath sounds. Abdominal:      General: Abdomen is flat. Bowel sounds are normal. There is no distension. Palpations: Abdomen is soft. There is no mass. Tenderness: There is no abdominal tenderness. There is no guarding or rebound. Hernia: No hernia is present. Skin:     General: Skin is warm and dry. Findings: No rash. Neurological:      Mental Status: She is alert and oriented to person, place, and time. Psychiatric:         Mood and Affect: Mood normal.     Vitals:    05/10/23 0944   BP: 128/70       Assessment and plan       1. Generalized abdominal pain  Episodes of abdominal pain. Improved after laxative use. Watch for return of symptoms or worsening symptoms.   - Comprehensive Metabolic Panel;  Future

## 2023-05-25 ENCOUNTER — HOSPITAL ENCOUNTER (OUTPATIENT)
Dept: GENERAL RADIOLOGY | Age: 58
Discharge: HOME | End: 2023-05-25
Payer: COMMERCIAL

## 2023-05-25 DIAGNOSIS — R10.13 ABDOMINAL PAIN, EPIGASTRIC: ICD-10-CM

## 2023-05-25 PROCEDURE — 74018 RADEX ABDOMEN 1 VIEW: CPT

## 2023-06-01 DIAGNOSIS — E06.3 HASHIMOTO'S DISEASE: Chronic | ICD-10-CM

## 2023-06-01 RX ORDER — LEVOTHYROXINE SODIUM 0.05 MG/1
TABLET ORAL
Qty: 30 TABLET | Refills: 12 | Status: SHIPPED | OUTPATIENT
Start: 2023-06-01

## 2023-06-01 NOTE — TELEPHONE ENCOUNTER
Medication:   Requested Prescriptions     Pending Prescriptions Disp Refills    levothyroxine (SYNTHROID) 50 MCG tablet [Pharmacy Med Name: LEVOTHYROXINE 50 MCG TABLET] 30 tablet 12     Sig: TAKE ONE TABLET BY MOUTH DAILY       Last Filled:      Patient Phone Number: [phone] (home)     Last appt: 5/23/2022   Next appt: 7/31/2023    Last Thyroid:   Lab Results   Component Value Date/Time    TSH 3.12 02/22/2018 08:34 AM    FT3 3.1 07/29/2014 10:33 AM    T4FREE 1.3 02/22/2018 08:34 AM

## 2023-06-19 DIAGNOSIS — E06.3 HASHIMOTO'S DISEASE: Chronic | ICD-10-CM

## 2023-06-19 RX ORDER — ERGOCALCIFEROL 1.25 MG/1
CAPSULE ORAL
Qty: 2 CAPSULE | Refills: 5 | Status: SHIPPED | OUTPATIENT
Start: 2023-06-19

## 2023-06-19 NOTE — TELEPHONE ENCOUNTER
Medication:   Requested Prescriptions     Pending Prescriptions Disp Refills    vitamin D (ERGOCALCIFEROL) 1.25 MG (86252 UT) CAPS capsule [Pharmacy Med Name: VIT D2 (ERGOCAL) 1.25MG(50,000U) CP] 2 capsule 5     Sig: TAKE ONE CAPSULE BY MOUTH EVERY 2 WEEKS       Last Filled:      Patient Phone Number: [phone] (home)     Last appt: 5/23/2022   Next appt: 7/31/2023    Last Labs DM:   Lab Results   Component Value Date/Time    VITD25 54.1 04/15/2022 02:51 PM    VITD25 50.2 04/14/2021 10:10 AM

## 2023-06-28 ENCOUNTER — TELEPHONE (OUTPATIENT)
Dept: INTERNAL MEDICINE CLINIC | Age: 58
End: 2023-06-28

## 2023-07-26 DIAGNOSIS — E55.9 VITAMIN D DEFICIENCY: ICD-10-CM

## 2023-07-26 DIAGNOSIS — R10.84 GENERALIZED ABDOMINAL PAIN: ICD-10-CM

## 2023-07-26 DIAGNOSIS — E04.1 THYROID NODULE: ICD-10-CM

## 2023-07-26 DIAGNOSIS — E04.1 THYROID NODULE: Primary | ICD-10-CM

## 2023-07-26 DIAGNOSIS — E06.3 HASHIMOTO'S DISEASE: ICD-10-CM

## 2023-07-27 LAB
25(OH)D3 SERPL-MCNC: 56.2 NG/ML
ALBUMIN SERPL-MCNC: 4.1 G/DL (ref 3.4–5)
ALBUMIN/GLOB SERPL: 1.5 {RATIO} (ref 1.1–2.2)
ALP SERPL-CCNC: 101 U/L (ref 40–129)
ALT SERPL-CCNC: 13 U/L (ref 10–40)
ANION GAP SERPL CALCULATED.3IONS-SCNC: 12 MMOL/L (ref 3–16)
AST SERPL-CCNC: 19 U/L (ref 15–37)
BILIRUB SERPL-MCNC: <0.2 MG/DL (ref 0–1)
BUN SERPL-MCNC: 26 MG/DL (ref 7–20)
CALCIUM SERPL-MCNC: 9.5 MG/DL (ref 8.3–10.6)
CHLORIDE SERPL-SCNC: 103 MMOL/L (ref 99–110)
CO2 SERPL-SCNC: 24 MMOL/L (ref 21–32)
CREAT SERPL-MCNC: 0.7 MG/DL (ref 0.6–1.1)
GFR SERPLBLD CREATININE-BSD FMLA CKD-EPI: >60 ML/MIN/{1.73_M2}
GLUCOSE SERPL-MCNC: 95 MG/DL (ref 70–99)
POTASSIUM SERPL-SCNC: 4.4 MMOL/L (ref 3.5–5.1)
PROT SERPL-MCNC: 6.9 G/DL (ref 6.4–8.2)
SODIUM SERPL-SCNC: 139 MMOL/L (ref 136–145)
TSH SERPL DL<=0.005 MIU/L-ACNC: 3.9 UIU/ML (ref 0.27–4.2)

## 2023-07-31 ENCOUNTER — OFFICE VISIT (OUTPATIENT)
Dept: ENDOCRINOLOGY | Age: 58
End: 2023-07-31
Payer: COMMERCIAL

## 2023-07-31 VITALS
DIASTOLIC BLOOD PRESSURE: 61 MMHG | BODY MASS INDEX: 27.78 KG/M2 | OXYGEN SATURATION: 98 % | HEART RATE: 73 BPM | SYSTOLIC BLOOD PRESSURE: 101 MMHG | WEIGHT: 147 LBS

## 2023-07-31 DIAGNOSIS — E55.9 VITAMIN D DEFICIENCY: ICD-10-CM

## 2023-07-31 DIAGNOSIS — E03.9 ACQUIRED HYPOTHYROIDISM: Primary | ICD-10-CM

## 2023-07-31 DIAGNOSIS — E06.3 HASHIMOTO'S DISEASE: Chronic | ICD-10-CM

## 2023-07-31 PROCEDURE — G8427 DOCREV CUR MEDS BY ELIG CLIN: HCPCS | Performed by: INTERNAL MEDICINE

## 2023-07-31 PROCEDURE — 99214 OFFICE O/P EST MOD 30 MIN: CPT | Performed by: INTERNAL MEDICINE

## 2023-07-31 PROCEDURE — 3017F COLORECTAL CA SCREEN DOC REV: CPT | Performed by: INTERNAL MEDICINE

## 2023-07-31 PROCEDURE — G8417 CALC BMI ABV UP PARAM F/U: HCPCS | Performed by: INTERNAL MEDICINE

## 2023-07-31 PROCEDURE — 1036F TOBACCO NON-USER: CPT | Performed by: INTERNAL MEDICINE

## 2023-07-31 RX ORDER — PLECANATIDE 3 MG/1
TABLET ORAL
COMMUNITY

## 2023-07-31 RX ORDER — LEVOTHYROXINE SODIUM 0.07 MG/1
75 TABLET ORAL DAILY
Qty: 30 TABLET | Refills: 11 | Status: SHIPPED | OUTPATIENT
Start: 2023-07-31

## 2023-07-31 NOTE — PROGRESS NOTES
(TRULANCE) 3 MG TABS Take by mouth      vitamin D (ERGOCALCIFEROL) 1.25 MG (09699 UT) CAPS capsule TAKE ONE CAPSULE BY MOUTH EVERY 2 WEEKS 2 capsule 5    gabapentin (NEURONTIN) 300 MG capsule TAKE TWO CAPSULES BY MOUTH EVERY NIGHT AT BEDTIME 60 capsule 1    levothyroxine (SYNTHROID) 50 MCG tablet TAKE ONE TABLET BY MOUTH DAILY 30 tablet 12    clonazePAM (KLONOPIN) 0.5 MG tablet TAKE ONE TABLET BY MOUTH TWICE A DAY AS NEEDED (Patient not taking: Reported on 7/31/2023) 60 tablet 2    Naproxen Sodium (ALEVE PO) Take by mouth (Patient not taking: Reported on 7/31/2023)      bismuth subsalicylate (PEPTO BISMOL) 262 MG chewable tablet Take 2 tablets by mouth (Patient not taking: Reported on 7/31/2023)       No current facility-administered medications for this visit. Review of Systems  Scanned, reviewed    Vitals:    07/31/23 1632   BP: 101/61   Pulse: 73   SpO2: 98%       Constitutional: Well-developed, appears stated age, cooperative, in no acute distress  H/E/N/M/T:atraumatic, normocephalic, external ears, nose, lips normal without lesions  Eyes: Lids, lashes, conjunctivae and sclerae normal, No proptosis, no redness  Neck: supple, symmetrical, no swelling  Skin: No obvious rashes or lesions present. Skin and hair texture normal  Psychiatric: Judgement and Insight:  judgement and insight appear normal  Neuro: Normal without focal findings, speech is normal normal, speech is spontaneous  Chest: No labored breathing, no chest deformity, no stridor  Musculoskeletal: No joint deformity, swelling      Lab Review  Lab Results   Component Value Date/Time    TSH 3.90 07/26/2023 04:30 PM     No results found for: FREET4      Assessment: 1. Subclinical Hypothyroidism:                         She had a high TSH with low normal FT4 indicative of subclinical hypothyroidism. TPO antibodies are normal, but reviewed images, heterogenous gland indicative of Hashimotos.   I discussed with her that with her levels, I do no think

## 2023-08-01 ENCOUNTER — OFFICE VISIT (OUTPATIENT)
Dept: INTERNAL MEDICINE CLINIC | Age: 58
End: 2023-08-01
Payer: COMMERCIAL

## 2023-08-01 VITALS
TEMPERATURE: 97.3 F | HEART RATE: 77 BPM | WEIGHT: 146 LBS | OXYGEN SATURATION: 97 % | SYSTOLIC BLOOD PRESSURE: 112 MMHG | DIASTOLIC BLOOD PRESSURE: 68 MMHG | BODY MASS INDEX: 27.59 KG/M2

## 2023-08-01 DIAGNOSIS — M79.7 FIBROMYALGIA: Primary | Chronic | ICD-10-CM

## 2023-08-01 DIAGNOSIS — R14.0 ABDOMINAL BLOATING: ICD-10-CM

## 2023-08-01 DIAGNOSIS — E04.2 MULTIPLE THYROID NODULES: ICD-10-CM

## 2023-08-01 DIAGNOSIS — E03.4 HYPOTHYROIDISM DUE TO ACQUIRED ATROPHY OF THYROID: Chronic | ICD-10-CM

## 2023-08-01 DIAGNOSIS — F41.1 GENERALIZED ANXIETY DISORDER: ICD-10-CM

## 2023-08-01 DIAGNOSIS — Z87.891 SMOKING HISTORY: ICD-10-CM

## 2023-08-01 DIAGNOSIS — G47.9 SLEEP DISORDER: ICD-10-CM

## 2023-08-01 PROBLEM — R10.9 ABDOMINAL PAIN: Status: RESOLVED | Noted: 2019-06-14 | Resolved: 2023-08-01

## 2023-08-01 PROCEDURE — G8417 CALC BMI ABV UP PARAM F/U: HCPCS | Performed by: STUDENT IN AN ORGANIZED HEALTH CARE EDUCATION/TRAINING PROGRAM

## 2023-08-01 PROCEDURE — G8427 DOCREV CUR MEDS BY ELIG CLIN: HCPCS | Performed by: STUDENT IN AN ORGANIZED HEALTH CARE EDUCATION/TRAINING PROGRAM

## 2023-08-01 PROCEDURE — 1036F TOBACCO NON-USER: CPT | Performed by: STUDENT IN AN ORGANIZED HEALTH CARE EDUCATION/TRAINING PROGRAM

## 2023-08-01 PROCEDURE — 99213 OFFICE O/P EST LOW 20 MIN: CPT | Performed by: STUDENT IN AN ORGANIZED HEALTH CARE EDUCATION/TRAINING PROGRAM

## 2023-08-01 PROCEDURE — 3017F COLORECTAL CA SCREEN DOC REV: CPT | Performed by: STUDENT IN AN ORGANIZED HEALTH CARE EDUCATION/TRAINING PROGRAM

## 2023-08-01 RX ORDER — GABAPENTIN 300 MG/1
CAPSULE ORAL
Qty: 60 CAPSULE | Refills: 1 | Status: SHIPPED | OUTPATIENT
Start: 2023-08-01 | End: 2023-09-30

## 2023-08-01 NOTE — ASSESSMENT & PLAN NOTE
DISCHARGE INSTRUCTIONS:    FOLLOW UP APPOINTMENT: Following discharge from the hospital call the office in the next 1-2 days to set up a post-operative appointment to be seen in 1-2 weeks by Dr Ludy Luciano: Following discharge from the hospital, you may have some questions about your procedure, your activities or your general condition  You can expect to be sore and tender mostly around the incisions  This pain will last a few days and should gradually improve  INCISION SITES:  - You may apply ice to the incisions to help with pain  - It is normal to have some bruising, swelling or mild discoloration around the incision  If increasing redness or pain develops, call our office immediately    -Do not apply any creams, lotions, or ointments  If you have surgical adhesive glue:  - The incisions are closed with dissolvable sutures underneath the skin and a surgical adhesive glue overlying the skin  - Do not pick at the adhesive  It will naturally wear off with time  WOUND CARE:  -Avoid tight adhering, irritative clothing   -You may gently shower in 24 hours, let water and soap run down and pat dry; do not scrub the incision sites    -No baths, hot tubs, or swimming x 6 weeks or until cleared after follow up appointment  PAIN CONTROL/MEDICATIONS:  -Recommend taking over the counter pain medication such as Tylenol OR Ibuprofen first; read and follow labels on bottles  -Only use prescribed pain medications as needed and try to taper down use overtime  Do not drive or operate heavy machinery while on prescription pain medications  Do not take with alcohol   - If you were given a prescription for Percocet, Norco, or Vicodin for pain be sure to eat prior to taking as these medications as they may cause nausea and vomiting on an empty stomach      -DO NOT take Tylenol  (acetaminophen) with prescribed pain medication for a fever or for further pain control as these medications already contain Recent US reviewed  Sees endo Tylenol in them  Do not exceed more than 4000 mg of acetaminophen in 24 hours or 3000 mg if you have liver disease    -You may apply ice at the incision site as needed for 20-30 minutes on/off to decrease pain or swelling  DIET/LIFE HABITS:  -Advance diet as tolerated  Start with bland foods    -Stay hydrated  Drink plenty of non-caffienated, non-alcoholic beverages such as water, juices, popsicles, etc   -Abstain from drinking alcohol as this can interrupt wound healing and increase chance of unwanted bleeding    -No smoking at least 2 weeks post surgery, this can delay wound healing  Smoking cessation is encouraged and we can provide you with options to facilitate cessation  ACTIVITY/RESTRICTIONS:  -No strenuous exercise and no heavy lifting, pulling, or pushing >10-15 lbs x 4 weeks or until cleared by surgeon   -The evening following the procedure you should rest as much as possible, sitting, lying or reclining  You should be sure someone remains with you until the next morning  Gradually increase your activity daily  Walking 3-4 times daily is good and stairs are ok  Listen to your body  If you start to get tired or sore then rest    -No driving for 1 week or while taking narcotics for pain  RETURN TO WORK:  -You may return to work or other activities as soon as your pain is controlled and you feel comfortable  For many people, this is 5 to 7 days after surgery  If your job requires heavy lifting you will need to be on light duty for 2-3 weeks       CALL THE OFFICE IF/RETURN TO ED:  -Fevers/chills with uncontrolled temperature > 100 4 F   -Increasing severe pain uncontrolled with pain medicine   -Incision site is having thick, yellow drainage, increasing redness, is warm to the touch, or becoming increasingly tender   -Unexplained bleeding   -Any changes in overall filiberto such as: nausea/vomitting, fevers/chills, diarrhea/constipation, inability/difficulty urinating, chest pains, palpations, trouble breathing, coughing, excessive fatigue/weakness, etc         Reparación de jostin hernia inguinal   LO QUE NECESITA SABER:   La reparación de jostin hernia inguinal podría realizarse abierta o laparoscópicamente  Con la abierta, leslie médico realizará 1 incisión yovanny y reparará leslie hernia  Laparoscópicamente es cuando él realiza de 2 a 3 incisiones pequeñas y repara leslie hernia  INSTRUCCIONES SOBRE EL [facility]:   Llame al 911 en amy de presentar lo siguiente:  · Usted se siente mareada, le hace falta el aire y tiene dolor de [place]  · Usted expectora carla  · Usted tiene dificultad para respirar  Busque atención médica de inmediato si:  · Leslie brazo o pierna se siente caliente, sensible y [place]  Se podría brooks inflamado y abraham  · La carla empapa el vendaje  · Leslie abdomen o mariela se sienten duras y se pepe más grandes de lo normal     · Jemma evacuaciones intestinales son de color bert, con [place], o de apariencia alquitranada  Comuníquese con leslie médico si:  · Usted tiene fiebre por encima de los 101°F     · Usted desarrolla salpullido, urticaria o comezón en la piel  · Leslie incisión está inflamada, enrojecida o drena pus o líquido  · Usted tiene náuseas o está vomitando  · Usted no puede tener jostin evacuación intestinal     · Tiene dificultad para orinar  · Leslie dolor no mejora después de zeynep leslie medicamento para el dolor  · Usted tiene preguntas o inquietudes acerca de leslie condición o cuidado  Medicamentos: Es posible que usted necesite alguno de los siguientes:  · Puede administrarse podrían administrarse  Pregunte al médico cómo debe zeynep katey medicamento de forma enamorado  Algunos medicamentos recetados para el dolor contienen acetaminofén  No tome otros medicamentos que contengan acetaminofén sin consultarlo con leslie médico  Demasiado acetaminofeno puede causar daño al hígado  Los medicamentos recetados para el dolor podrían causar estreñimiento   Pregunte a leslie médico reji prevenir o tratar estreñimiento  · [place], reji el ibuprofeno, [facility] a disminuir la inflamación, el dolor y la [place]  [place] medicamento está disponible con o sin jostin receta médica  Los REY pueden causar sangrado estomacal o problemas renales en ciertas personas  Si usted manjula un medicamento anticoagulante, siempre pregúntele a leslie médico si los REY son seguros para usted  Siempre jodie la etiqueta de katelin medicamento y [place] instrucciones  · Acetaminofén tamera el dolor y baja la fiebre  Está disponible sin receta médica  Pregunte la cantidad y la frecuencia con que debe tomarlos  [address]  Jodie las etiquetas de todos los demás medicamentos que esté usando para saber si también contienen acetaminofén, o pregunte a lesile médico o farmacéutico  El acetaminofén puede causar daño en el hígado cuando no se manjula de forma correcta  No use más de 4 gramos (4000 miligramos) en total de acetaminofeno en un día  · [place] farzaneh medicamentos reji se le haya indicado  Consulte con leslie médico si usted kristian que leslie medicamento no le está ayudando o si presenta efectos secundarios  Infórmele si es alérgico a cualquier medicamento  Mantenga jostin lista actualizada de los [place], las vitaminas y los productos herbales que manjula  Incluya los siguientes datos de los medicamentos: cantidad, frecuencia y motivo de administración  Traiga con usted la lista o los envases de las píldoras a farzaneh citas de seguimiento  Lleve la lista de los medicamentos con usted en amy de jostin emergencia  Siga las instrucciones de leslie médico sobre el cuidado de farzaneh heridas: Usted puede bañarse a las 48 horas  Quítese el vendaje antes de bañarse  Es normal que hannah jostin pequeña cantidad de carla debajo del vendaje  Lave cuidadosamente el área alrededor de leslie herida  Está jeramie si Janet Gonzalez y el agua corren ligeramente sobre leslie herida  No  frote leslie herida  Seque leslie herida con cuidado   Si le pusieron tiras de cinta [employer] incisión, espere a que se caigan solas  Puede zeynep de 7 a 10 días para que se caigan  No tome un baño en jostin agustina, jostin piscina o jostin bañera de hidromasajes hasta que leslie médico lo autorice  Cuidados personales:  · Consuma alimentos saludables y variados  Los alimentos saludables incluyen frutas, verduras, pan integral, productos lácteos bajos en grasa, frijoles, saad magras y pescado  Los alimentos saludables podrían ayudar a que sane más rápido  Pregunte si necesita seguir jostin dieta especial     · [place] líquidos reji se le haya indicado  Los líquidos podrían evitar el estreñimiento y el esfuerzo susana jostin evacuación intestinal  [place] ayudará a evitar la presión en leslie incisión y a evitar otra hernia  Pregunte cuánto líquido debe zeynep cada día y cuáles líquidos son los más adecuados para usted  · Aplique hielo en la incisión de 15 a 20 minutos cada hora o reji se le indique  Use jostin compresa de hielo o ponga hielo triturado en jostin bolsa de plástico  Josph Longo con jostin toalla  El hielo ayuda a evitar daño al tejido y a disminuir la inflamación y el dolor  · Respire profundo y [address]  [place] ayudará disminuir el riesgo de contraer jostin infección pulmonar  Respire profundo y sostenga el aire lo más que pueda  Expulse todo el aire y luego tosa con Татьяна Braver  La respiración profunda ayuda a abrir las vías respiratorias  Es posible que le proporcionen un espirómetro de incentivo para ayudarlo a respirar hondo  Coloque la boquilla en farzaneh labios y respire lento y profundo  Luego exhale y [place]  Repita estos pasos 10 veces por hora  Presione jostin almohada ligeramente contra leslie incisión cuando tosa  [place] podría disminuir el dolor o la [place]  · No fume  La nicotina y otros químicos en los cigarrillos y cigarros pueden evitar que leslie herida sane  También puede aumentar el riesgo de tener otra hernia inguinal  Pida información a leslie médico si usted actualmente fuma y necesita ayuda para dejar de fumar   Los cigarrillos electrónicos o el tabaco sin humo igualmente contienen nicotina  Consulte con leslie médico antes de [employer]  Manejar un vehículo: No maneje por lo menos por 1 semana después de la [place]  No maneje si está tomando un medicamento prescrito para el dolor  No maneje hasta que se sienta cómodo para usar el cinturón de seguridad por encima de leslie abdomen  Pregunte a leslie médico cuándo es seguro para que usted [facility]  Actividad: Es importante que usted se levante de la cama y camine al siguiente día de leslie [place]  [place] le ayudará a evitar coágulos de [place], a  farzaneh intestinos después de la cirugía y a mejorar la sanación  No levante nada pesado hasta que leslie médico lo autorice  [place] podría ejercer mucha presión en leslie incisión y provocar que se jordyn  También podría aumentar leslie riesgo de otra hernia  No practique deportes por 2 a 3 semanas  Pregunte a leslie médico cuándo puede regresar a trabajar, a la escuela o a leslie actividades normales  Acuda a farzaneh consultas de control con leslie médico según le indicaron  Anote farzaneh preguntas para que se acuerde de hacerlas susana farzaneh visitas  © Copyright [employer] 2021 Information is for End User's use only and may not be sold, redistributed or otherwise used for commercial purposes  All illustrations and images included in CareNotes® are the copyrighted property of [employer]  or [address] [place] [address] es sólo para uso en educación  Leslie intención no es darle un consejo médico sobre enfermedades o tratamientos  Colsulte con leslie Renata Poncho farmacéutico antes de seguir cualquier régimen médico para saber si es seguro y efectivo para usted

## 2023-08-01 NOTE — PROGRESS NOTES
Ernesto Lal (:  1965) is a 56 y.o. female here for evaluation of the following chief complaint(s):  Established New Doctor (Refill needed/ ct lung is due)         ASSESSMENT/PLAN:  1. Fibromyalgia  Assessment & Plan:   Sx well controlled with Gabapentin  Orders:  -     gabapentin (NEURONTIN) 300 MG capsule; two at bedtime, Disp-60 capsule, R-1Normal  2. Sleep disorder  Assessment & Plan:   Has been on Gabapentin for fibromyalgia that also helps with her sleep  Orders:  -     gabapentin (NEURONTIN) 300 MG capsule; two at bedtime, Disp-60 capsule, R-1Normal  3. Hypothyroidism due to acquired atrophy of thyroid  Assessment & Plan:  See aPuly, Dr. Ness Rudd  Had her med adjusted recently due anxiety     4. Multiple thyroid nodules  Assessment & Plan:   Recent US reviewed  Sees pauly  5. Generalized anxiety disorder  Assessment & Plan: On intermittent Prn dose of Klonopin. I discussed with patient at length about potential risks of benzo use. 6. Smoking history  Assessment & Plan:   Started smoking since age 11, used to smoke 3 packs daily  Quite in   Orders:  -     CT Lung Screen (Initial or Annual); Future  7. Abdominal bloating  Assessment & Plan:   Sees GI   Had history of colo resection   On Trulance, also on Miralax - still have loose stool       Return in about 6 months (around 2024) for Anxiety, Routine follow-up. Subjective   SUBJECTIVE/OBJECTIVE:  HPI  Presented to establish care. Former patient of Dr. Darlene Garcia. No acute complaints. Needed Gabapentin refill.      Review of Systems:   Constitutional:  Denies fever or chills   Eyes:  Denies change in visual acuity   HENT:  Denies nasal congestion or sore throat   Respiratory:  Denies cough or shortness of breath   Cardiovascular:  Denies chest pain or edema   GI:  Denies abdominal pain, nausea, vomiting, bloody stools or diarrhea   :  Denies dysuria   Musculoskeletal:  Denies back pain or joint pain   Integument:  Denies rash   Neurologic:

## 2023-08-01 NOTE — ASSESSMENT & PLAN NOTE
On intermittent Prn dose of Klonopin. I discussed with patient at length about potential risks of benzo use.

## 2023-08-15 ENCOUNTER — TELEPHONE (OUTPATIENT)
Dept: INTERNAL MEDICINE CLINIC | Age: 58
End: 2023-08-15

## 2023-08-15 NOTE — TELEPHONE ENCOUNTER
Discussed with insurance. CT lung screening test was mistakenly ordered as diagnostic test by . Patient need CT lung screening. Authorization number given to office staff.

## 2023-08-15 NOTE — TELEPHONE ENCOUNTER
A CT chest was ordered for pt and was the order was taken to [facility], [facility]. They called to say it was denied and that the insurance is requesting a peer to peer. They denied the whole case and insurance is saying that the test is unnecessary. We need to explain/support why the test is needed.   Please call    Order # [id number]  [employer] number: [phone]

## 2023-08-15 NOTE — TELEPHONE ENCOUNTER
Ivonne García from [address] [place] [address] is calling to inform us the PA was approved    Authorization  # is [id number]

## 2023-08-15 NOTE — TELEPHONE ENCOUNTER
CALLED  COURTNEY GONSALEZ regarding CT LUNG TESTING (SCREENING) NOT DIAGNOSTIC   Left message on machine    PER DR CARRION:  AUTH# FOR DIAGNOSTIC  [id number] VALID THROUGH 8/15-9/14/23

## 2023-08-16 ENCOUNTER — TELEPHONE (OUTPATIENT)
Dept: INTERNAL MEDICINE CLINIC | Age: 58
End: 2023-08-16

## 2023-08-16 NOTE — TELEPHONE ENCOUNTER
Does Dr. Estella Chowdhury want a diagnostic UT of the chest or a lung cancer screening? She would like to discuss the referral process if in fact Dr. Estella Chowdhury does want a lung cancer screening. Please call and advise, if she doesn't answer, leave a message and she will call you back.

## 2023-08-17 ENCOUNTER — TELEPHONE (OUTPATIENT)
Dept: INTERNAL MEDICINE CLINIC | Age: 58
End: 2023-08-17

## 2023-08-17 NOTE — TELEPHONE ENCOUNTER
Spoke with karlene @  [facility] [address] ,SHE SPOKE WITH PATIENT INS. THEY DO NOT COVER THIS PROCEDURE @  [facility] ,shewas very up set I advised her to call /[employer] To see what can be done!!!     Note I called patient back to find out if she wanted me to schedule test @ [facility] she refused.

## 2023-08-17 NOTE — TELEPHONE ENCOUNTER
I spoke with karlene @  [facility] Inte. ??? [id number]  She stated patient ins. Will not cover  this procedure @ there facility.  PT INFORMED

## 2023-08-17 NOTE — TELEPHONE ENCOUNTER
Elijah Abbott from [facility] called in wanting to speak with Bill Henley about the patient getting a lung screen done at [facility]. Pls call and advise.

## 2023-08-22 DIAGNOSIS — E06.3 HASHIMOTO'S DISEASE: Chronic | ICD-10-CM

## 2023-08-22 RX ORDER — LEVOTHYROXINE SODIUM 0.05 MG/1
50 TABLET ORAL DAILY
Qty: 30 TABLET | Refills: 12 | Status: SHIPPED | OUTPATIENT
Start: 2023-08-22

## 2023-08-25 ENCOUNTER — TELEPHONE (OUTPATIENT)
Dept: INTERNAL MEDICINE CLINIC | Age: 58
End: 2023-08-25

## 2023-08-25 NOTE — TELEPHONE ENCOUNTER
Pt called in and needs Dr. Rosanne Bowles to resubmit the order for the CT to be done at [facility] on 9/11. The insurance company is being specific and it needs to be at the [facility] on 9/11. Please call and advise when this has been done. Pt needs us to send the order to her insurance company to make sure they will cover it.

## 2023-08-29 NOTE — TELEPHONE ENCOUNTER
Jazmín called to ask for us to start the PA procress for the CT at [facility].

## 2023-08-30 NOTE — TELEPHONE ENCOUNTER
[phone] ([place])   Spoke with patient she will call [employer] To find out what is needed,/and JUANCHO () ALSO. I asked her if they could do a 3 way call (INS. )! !! SHE WILL CALL BACK WITH INFORMATION.

## 2023-09-30 DIAGNOSIS — M79.7 FIBROMYALGIA: Chronic | ICD-10-CM

## 2023-09-30 DIAGNOSIS — G47.9 SLEEP DISORDER: ICD-10-CM

## 2023-10-02 RX ORDER — GABAPENTIN 300 MG/1
CAPSULE ORAL
Qty: 60 CAPSULE | Refills: 2 | Status: SHIPPED | OUTPATIENT
Start: 2023-10-02 | End: 2023-11-02

## 2023-10-30 ENCOUNTER — TELEPHONE (OUTPATIENT)
Dept: INTERNAL MEDICINE CLINIC | Age: 58
End: 2023-10-30

## 2023-10-30 NOTE — TELEPHONE ENCOUNTER
Patient is asking if Dr. Tristen Aceves would require her to make an appointment in order to get a refill on medication listed below. States next month sometime she will be going on vacation and would request it then but wanted to know if she needs an appointment first or not.       Please call and advise [phone]    gabapentin (NEURONTIN) 300 MG capsule [[id number]]

## 2023-11-06 ENCOUNTER — OFFICE VISIT (OUTPATIENT)
Dept: INTERNAL MEDICINE CLINIC | Age: 58
End: 2023-11-06
Payer: COMMERCIAL

## 2023-11-06 VITALS
SYSTOLIC BLOOD PRESSURE: 118 MMHG | OXYGEN SATURATION: 97 % | BODY MASS INDEX: 27.56 KG/M2 | TEMPERATURE: 97 F | HEART RATE: 73 BPM | DIASTOLIC BLOOD PRESSURE: 70 MMHG | HEIGHT: 61 IN | WEIGHT: 146 LBS

## 2023-11-06 DIAGNOSIS — G47.9 SLEEP DISORDER: ICD-10-CM

## 2023-11-06 DIAGNOSIS — F41.9 ANXIETY: Primary | ICD-10-CM

## 2023-11-06 DIAGNOSIS — M79.7 FIBROMYALGIA: Chronic | ICD-10-CM

## 2023-11-06 PROCEDURE — G8484 FLU IMMUNIZE NO ADMIN: HCPCS | Performed by: STUDENT IN AN ORGANIZED HEALTH CARE EDUCATION/TRAINING PROGRAM

## 2023-11-06 PROCEDURE — 1036F TOBACCO NON-USER: CPT | Performed by: STUDENT IN AN ORGANIZED HEALTH CARE EDUCATION/TRAINING PROGRAM

## 2023-11-06 PROCEDURE — 99213 OFFICE O/P EST LOW 20 MIN: CPT | Performed by: STUDENT IN AN ORGANIZED HEALTH CARE EDUCATION/TRAINING PROGRAM

## 2023-11-06 PROCEDURE — G8417 CALC BMI ABV UP PARAM F/U: HCPCS | Performed by: STUDENT IN AN ORGANIZED HEALTH CARE EDUCATION/TRAINING PROGRAM

## 2023-11-06 PROCEDURE — G8427 DOCREV CUR MEDS BY ELIG CLIN: HCPCS | Performed by: STUDENT IN AN ORGANIZED HEALTH CARE EDUCATION/TRAINING PROGRAM

## 2023-11-06 PROCEDURE — 3017F COLORECTAL CA SCREEN DOC REV: CPT | Performed by: STUDENT IN AN ORGANIZED HEALTH CARE EDUCATION/TRAINING PROGRAM

## 2023-11-06 RX ORDER — GABAPENTIN 300 MG/1
CAPSULE ORAL
Qty: 180 CAPSULE | Refills: 3 | Status: SHIPPED | OUTPATIENT
Start: 2023-11-06 | End: 2023-12-11

## 2023-11-06 NOTE — PROGRESS NOTES
Taran Aceves (:  1965) is a 66 y.o. female here for evaluation of the following chief complaint(s):  Medication Refill (refills needed , patient request 6mo. Supply  )         ASSESSMENT/PLAN:  1. Anxiety  Assessment & Plan:  She does not need and did not request a refill for Klonopin today. However when we reviewed her medication list, I discussed with patient that Klonopin is a benzodiazepine and a controlled substance. In the future (not right at this moment), will need to work on weaning her down from it. She is not willing to work  on weaning down from [employer] and stated that if she can't get her Klonopin here, she will switch practice and look for a different doctor. She stated that she does not understand why she needs to cut it down if she is using it sparingly for many years. Her last refill was in 2023. I explained to the patient that I want to wean her down from Klonopin in the future not because I suspected her abusing the medications but it is an appropriate step. She reports bad experience of withdrawal after sudden discontinued of SSRI by one of her previous doctors. She listed several classes of medications that she tried before such as SSRI, SNRI, TCA and will not willing to switch to any other medication due to bad experience. She stated that she does not understand why switching to another medications that can potentially harm her than give her any benefit. She is not willing to discuss further. She will look for another physician to continue with her medication. 2. Fibromyalgia  -     gabapentin (NEURONTIN) 300 MG capsule; TAKE TWO CAPSULES BY MOUTH AT BEDTIME, Disp-180 capsule, R-3Normal  3. Sleep disorder  -     gabapentin (NEURONTIN) 300 MG capsule; TAKE TWO CAPSULES BY MOUTH AT BEDTIME, Disp-180 capsule, R-3Normal      No follow-ups on file. Subjective   SUBJECTIVE/OBJECTIVE:  HPI  Presented today to follow up. Overall she is doing well.   Needed refill for

## 2023-11-06 NOTE — ASSESSMENT & PLAN NOTE
She does not need and did not request a refill for Klonopin today. However when we reviewed her medication list, I discussed with patient that Klonopin is a benzodiazepine and a controlled substance. In the future (not right at this moment), will need to work on weaning her down from it. She is not willing to work  on weaning down from [employer] and stated that if she can't get her Klonopin here, she will switch practice and look for a different doctor. She stated that she does not understand why she needs to cut it down if she is using it sparingly for many years. Her last refill was in 4/2023. I explained to the patient that I want to wean her down from Klonopin in the future not because I suspected her abusing the medications but it is an appropriate step. She reports bad experience of withdrawal after sudden discontinued of SSRI by one of her previous doctors. She listed several classes of medications that she tried before such as SSRI, SNRI, TCA and will not willing to switch to any other medication due to bad experience. She stated that she does not understand why switching to another medications that can potentially harm her than give her any benefit. She is not willing to discuss further. She will look for another physician to continue with her medication.

## 2024-02-12 ENCOUNTER — TELEPHONE (OUTPATIENT)
Dept: INTERNAL MEDICINE CLINIC | Age: 59
End: 2024-02-12

## 2024-02-12 NOTE — TELEPHONE ENCOUNTER
Pt is requesting a return to work note from being covid positive pt tested positive on 2/8/24 with a home test. Pt tested negative on 2/11/24 and has no symptoms, pt would like to return to work but they are requiring a doctors note .    Please call and advise if needs to be seen for this note? [phone]    Pt would like note emailed to her at [email or web address]

## 2024-03-10 DIAGNOSIS — E06.3 HASHIMOTO'S DISEASE: Chronic | ICD-10-CM

## 2024-03-11 RX ORDER — ERGOCALCIFEROL 1.25 MG/1
CAPSULE ORAL
Qty: 2 CAPSULE | Refills: 5 | Status: SHIPPED | OUTPATIENT
Start: 2024-03-11

## 2024-03-11 NOTE — TELEPHONE ENCOUNTER
Requested Prescriptions     Pending Prescriptions Disp Refills    vitamin D (ERGOCALCIFEROL) 1.25 MG (04442 UT) CAPS capsule [Pharmacy Med Name: VITAMIN D2 1.25MG(50,000 UNIT)] 2 capsule 5     Sig: TAKE ONE CAPSULE BY MOUTH EVERY 2 WEEKS       [facility] [id number] - [place], [place] - [address] - P [phone] - F [phone]  [address]  [facility] [address]  Phone: [phone] Fax: [phone]    Last OV 07/31/2023  Next OV 08/06/2024

## 2024-03-14 ENCOUNTER — OFFICE VISIT (OUTPATIENT)
Dept: INTERNAL MEDICINE CLINIC | Age: 59
End: 2024-03-14
Payer: COMMERCIAL

## 2024-03-14 VITALS
OXYGEN SATURATION: 96 % | HEIGHT: 60 IN | DIASTOLIC BLOOD PRESSURE: 82 MMHG | WEIGHT: 148.8 LBS | SYSTOLIC BLOOD PRESSURE: 124 MMHG | TEMPERATURE: 97 F | HEART RATE: 69 BPM | BODY MASS INDEX: 29.21 KG/M2

## 2024-03-14 DIAGNOSIS — M79.7 FIBROMYALGIA: Chronic | ICD-10-CM

## 2024-03-14 DIAGNOSIS — E03.4 HYPOTHYROIDISM DUE TO ACQUIRED ATROPHY OF THYROID: Chronic | ICD-10-CM

## 2024-03-14 DIAGNOSIS — F41.9 ANXIETY: Primary | ICD-10-CM

## 2024-03-14 DIAGNOSIS — G47.9 SLEEP DISORDER: ICD-10-CM

## 2024-03-14 PROCEDURE — 99214 OFFICE O/P EST MOD 30 MIN: CPT | Performed by: HOSPITALIST

## 2024-03-14 PROCEDURE — G8417 CALC BMI ABV UP PARAM F/U: HCPCS | Performed by: HOSPITALIST

## 2024-03-14 PROCEDURE — 3017F COLORECTAL CA SCREEN DOC REV: CPT | Performed by: HOSPITALIST

## 2024-03-14 PROCEDURE — 1036F TOBACCO NON-USER: CPT | Performed by: HOSPITALIST

## 2024-03-14 PROCEDURE — G8484 FLU IMMUNIZE NO ADMIN: HCPCS | Performed by: HOSPITALIST

## 2024-03-14 PROCEDURE — G8427 DOCREV CUR MEDS BY ELIG CLIN: HCPCS | Performed by: HOSPITALIST

## 2024-03-14 RX ORDER — GABAPENTIN 300 MG/1
CAPSULE ORAL
Qty: 180 CAPSULE | Refills: 3 | Status: SHIPPED | OUTPATIENT
Start: 2024-03-14 | End: 2024-07-21

## 2024-03-14 SDOH — HEALTH STABILITY: PHYSICAL HEALTH: ON AVERAGE, HOW MANY MINUTES DO YOU ENGAGE IN EXERCISE AT THIS LEVEL?: 30 MIN

## 2024-03-14 SDOH — HEALTH STABILITY: PHYSICAL HEALTH: ON AVERAGE, HOW MANY DAYS PER WEEK DO YOU ENGAGE IN MODERATE TO STRENUOUS EXERCISE (LIKE A BRISK WALK)?: 5 DAYS

## 2024-03-14 ASSESSMENT — PATIENT HEALTH QUESTIONNAIRE - PHQ9
6. FEELING BAD ABOUT YOURSELF - OR THAT YOU ARE A FAILURE OR HAVE LET YOURSELF OR YOUR FAMILY DOWN: 0
4. FEELING TIRED OR HAVING LITTLE ENERGY: 0
2. FEELING DOWN, DEPRESSED OR HOPELESS: 0
9. THOUGHTS THAT YOU WOULD BE BETTER OFF DEAD, OR OF HURTING YOURSELF: 0
10. IF YOU CHECKED OFF ANY PROBLEMS, HOW DIFFICULT HAVE THESE PROBLEMS MADE IT FOR YOU TO DO YOUR WORK, TAKE CARE OF THINGS AT HOME, OR GET ALONG WITH OTHER PEOPLE: 0
SUM OF ALL RESPONSES TO PHQ QUESTIONS 1-9: 2
SUM OF ALL RESPONSES TO PHQ9 QUESTIONS 1 & 2: 0
7. TROUBLE CONCENTRATING ON THINGS, SUCH AS READING THE NEWSPAPER OR WATCHING TELEVISION: 0
5. POOR APPETITE OR OVEREATING: 2
SUM OF ALL RESPONSES TO PHQ QUESTIONS 1-9: 2
SUM OF ALL RESPONSES TO PHQ QUESTIONS 1-9: 2
8. MOVING OR SPEAKING SO SLOWLY THAT OTHER PEOPLE COULD HAVE NOTICED. OR THE OPPOSITE, BEING SO FIGETY OR RESTLESS THAT YOU HAVE BEEN MOVING AROUND A LOT MORE THAN USUAL: 0
3. TROUBLE FALLING OR STAYING ASLEEP: 0
SUM OF ALL RESPONSES TO PHQ QUESTIONS 1-9: 2
1. LITTLE INTEREST OR PLEASURE IN DOING THINGS: 0

## 2024-03-14 ASSESSMENT — ENCOUNTER SYMPTOMS
VOICE CHANGE: 0
TROUBLE SWALLOWING: 0
SHORTNESS OF BREATH: 0
SORE THROAT: 0
DIARRHEA: 0
ABDOMINAL PAIN: 0
SINUS PRESSURE: 0
VOMITING: 0
BLOOD IN STOOL: 0
SINUS PAIN: 0
ABDOMINAL DISTENTION: 0
COUGH: 0
NAUSEA: 0
BACK PAIN: 0
CONSTIPATION: 0
WHEEZING: 0
CHEST TIGHTNESS: 0

## 2024-03-14 NOTE — PROGRESS NOTES
defined types were placed in this encounter.     Medications Discontinued During This Encounter   Medication Reason    Plecanatide (TRULANCE) 3 MG TABS Therapy completed        Return in about 6 months (around 9/14/2024).    TAMY GAMBOA MD    This dictation was generated by voice recognition computer software.  Although all attempts are made to edit the dictation for accuracy, there may be errors in the transcription that are not intended.
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
## 2024-06-07 DIAGNOSIS — F41.9 ANXIETY: ICD-10-CM

## 2024-06-07 DIAGNOSIS — M79.7 FIBROMYALGIA: Chronic | ICD-10-CM

## 2024-06-07 DIAGNOSIS — G47.9 SLEEP DISORDER: ICD-10-CM

## 2024-06-07 RX ORDER — GABAPENTIN 300 MG/1
CAPSULE ORAL
Qty: 180 CAPSULE | Refills: 3 | Status: SHIPPED | OUTPATIENT
Start: 2024-06-07 | End: 2024-10-14

## 2024-06-10 DIAGNOSIS — M79.7 FIBROMYALGIA: Chronic | ICD-10-CM

## 2024-06-10 DIAGNOSIS — F41.9 ANXIETY: ICD-10-CM

## 2024-06-10 DIAGNOSIS — G47.9 SLEEP DISORDER: ICD-10-CM

## 2024-06-10 NOTE — TELEPHONE ENCOUNTER
Pt also called 6-9-24 about her sciatic nerve pain and not being able to walk. Pt never received a call back.

## 2024-06-11 RX ORDER — CLONAZEPAM 0.5 MG/1
0.5 TABLET ORAL 2 TIMES DAILY PRN
Qty: 60 TABLET | Refills: 2 | Status: SHIPPED | OUTPATIENT
Start: 2024-06-11 | End: 2025-05-06

## 2024-06-12 RX ORDER — GABAPENTIN 300 MG/1
CAPSULE ORAL
Qty: 60 CAPSULE | Refills: 0 | OUTPATIENT
Start: 2024-06-12 | End: 2024-10-19

## 2024-06-12 RX ORDER — CLONAZEPAM 0.5 MG/1
0.5 TABLET ORAL 2 TIMES DAILY PRN
Qty: 60 TABLET | Refills: 0 | OUTPATIENT
Start: 2024-06-12 | End: 2025-05-07

## 2024-06-12 NOTE — TELEPHONE ENCOUNTER
These were ordered to [facility], located within [facility] [id number] - [place], [place] - [address] - P [phone] - F [phone]   [address], [facility] [address] on6/7/2024.

## 2024-06-12 NOTE — TELEPHONE ENCOUNTER
Patient is requesting medication due her sciatic nerve pain and not being able to walk , patient is unsure of why she will need an appointment to refill medication please advise

## 2024-06-12 NOTE — TELEPHONE ENCOUNTER
Medications were ordered 6/7 (Clonazepam and Gabapentin).  If she is having increased pain requiring more therapy she needs to be seen.

## 2024-07-08 ENCOUNTER — PATIENT MESSAGE (OUTPATIENT)
Dept: ENDOCRINOLOGY | Age: 59
End: 2024-07-08

## 2024-07-08 NOTE — TELEPHONE ENCOUNTER
From: Arlen English  To: Dr. Esteban Finch  Sent: 7/8/2024 11:40 AM EDT  Subject: Bloodwork     Do i need to do this on empty stomach? Or can i do anytime of day?

## 2024-07-15 DIAGNOSIS — E03.9 ACQUIRED HYPOTHYROIDISM: ICD-10-CM

## 2024-07-15 DIAGNOSIS — E55.9 VITAMIN D DEFICIENCY: ICD-10-CM

## 2024-07-15 LAB
25(OH)D3 SERPL-MCNC: 39.6 NG/ML
TSH SERPL DL<=0.005 MIU/L-ACNC: 2.04 UIU/ML (ref 0.27–4.2)

## 2024-08-06 ENCOUNTER — OFFICE VISIT (OUTPATIENT)
Dept: ENDOCRINOLOGY | Age: 59
End: 2024-08-06
Payer: COMMERCIAL

## 2024-08-06 VITALS
RESPIRATION RATE: 14 BRPM | DIASTOLIC BLOOD PRESSURE: 69 MMHG | HEIGHT: 60 IN | WEIGHT: 153 LBS | OXYGEN SATURATION: 98 % | SYSTOLIC BLOOD PRESSURE: 113 MMHG | HEART RATE: 80 BPM | BODY MASS INDEX: 30.04 KG/M2

## 2024-08-06 DIAGNOSIS — E55.9 VITAMIN D DEFICIENCY: Primary | ICD-10-CM

## 2024-08-06 DIAGNOSIS — E06.3 HASHIMOTO'S DISEASE: Chronic | ICD-10-CM

## 2024-08-06 PROCEDURE — 99214 OFFICE O/P EST MOD 30 MIN: CPT | Performed by: INTERNAL MEDICINE

## 2024-08-06 RX ORDER — ERGOCALCIFEROL 1.25 MG/1
CAPSULE ORAL
Qty: 2 CAPSULE | Refills: 5 | Status: SHIPPED | OUTPATIENT
Start: 2024-08-06 | End: 2024-08-07 | Stop reason: SDUPTHER

## 2024-08-06 RX ORDER — LEVOTHYROXINE SODIUM 0.05 MG/1
50 TABLET ORAL DAILY
Qty: 30 TABLET | Refills: 12 | Status: SHIPPED | OUTPATIENT
Start: 2024-08-06

## 2024-08-06 NOTE — PROGRESS NOTES
Subjective:      55  y.o WF who is here for thyroid evaluation.       Interim:     Stable  No issues  No neck swelling  Higher levothyroxine dose caused anxiety    Initial complaints: Fatigue improved ,  sleep problems,    muscle and joint pain,  Anxiety improved,  confusion,cold intolerance, brain fog  History of obstructive symptoms:  difficulty swallowing No, changes in voice/hoarseness  No.    History of radiation to patient's neck:   No  Recent iodine exposure:  No  Family history includes mom and sister hypothyroidism.  Family history of thyroid cancer:  No    Current smoking of cigarettes or cigars: No    Hypothyroidism for year    Mild controlled    TFT:  10/15 TSH 2.72 Vit D 23.8   6/15 TSH 2.18  Vitamin D 27  4/15 TSH 2.04 TPO 8 Vit D 12.4 start on vitamin D Replacement also taking D3 1000daily  11/14 TSH 2.42 increased to 50mcg  7/14  TSH 3.22 FT4 1.1  11/13 TSH 2.04 Free level normal  6/13  TSH 6.8  FT4  0.9 LTX 25mcg  6/13  TSH 3.75 FT4 0.7  3/13  TSH 3.5 FT4 0.85  3/13  TSH 6.03 FT4 0.77  9/11  TSH 3.53    She has thyroid nodules    Thyroid ultrasound 6/13     The right thyroid lobe is 3.5 x 1.3 x 1.3 cm in size. Left is 3.3   x 1.4 x 1.0 cm in size. Isthmus is 5 mm in thickness.   Thyroid gland is mildly inhomogeneous. Two hypoechoic nodules are   demonstrated within the mid to upper right lobe, 9 x 6 mm and 8 x   6 mm.   IMPRESSION:   2 nonspecific right thyroid hypoechoic nodules. Further   evaluation versus continued short-term followup would be   suggested.     11/14 Thyroid Ultrasound:  Right thyroid lobe measures 1.5 x 1.2 x 3.8 cm   Left lobe measures 1.3 x 0.9 x 3.4 cm.    Two subtle nodules in the right thyroid lobe measure 9 x 6 x 5 mm   and 5 x 5 x 4 mm, without significant interval change. No   discrete nodule is present within the left lobe or within the   isthmus. Small benign-appearing lymph node is located inferior to   the left lobe with central fatty hilum and normal cortical

## 2024-08-07 ENCOUNTER — TELEPHONE (OUTPATIENT)
Dept: ENDOCRINOLOGY | Age: 59
End: 2024-08-07

## 2024-08-07 DIAGNOSIS — E06.3 HASHIMOTO'S DISEASE: Chronic | ICD-10-CM

## 2024-08-07 DIAGNOSIS — E55.9 VITAMIN D DEFICIENCY: ICD-10-CM

## 2024-08-07 RX ORDER — ERGOCALCIFEROL 1.25 MG/1
CAPSULE ORAL
Qty: 2 CAPSULE | Refills: 5 | Status: SHIPPED | OUTPATIENT
Start: 2024-08-07

## 2024-08-07 NOTE — TELEPHONE ENCOUNTER
Call from [facility] on [facility] [facility] asking for clarification on the Vitamin D rx  2 caps every 2 weeks?     CB# [phone] -Lizzeth @ [facility]

## 2024-11-06 ENCOUNTER — OFFICE VISIT (OUTPATIENT)
Dept: PULMONOLOGY | Age: 59
End: 2024-11-06
Payer: COMMERCIAL

## 2024-11-06 VITALS
HEART RATE: 62 BPM | OXYGEN SATURATION: 99 % | BODY MASS INDEX: 29.07 KG/M2 | SYSTOLIC BLOOD PRESSURE: 110 MMHG | WEIGHT: 154 LBS | HEIGHT: 61 IN | RESPIRATION RATE: 16 BRPM | DIASTOLIC BLOOD PRESSURE: 70 MMHG

## 2024-11-06 DIAGNOSIS — J43.2 CENTRILOBULAR EMPHYSEMA (HCC): Primary | ICD-10-CM

## 2024-11-06 PROCEDURE — 99203 OFFICE O/P NEW LOW 30 MIN: CPT | Performed by: INTERNAL MEDICINE

## 2024-11-06 NOTE — PROGRESS NOTES
Exam  Vitals reviewed.   Constitutional:       Appearance: She is well-developed.   HENT:      Head: Normocephalic and atraumatic.   Eyes:      Pupils: Pupils are equal, round, and reactive to light.   Neck:      Vascular: No JVD.   Cardiovascular:      Rate and Rhythm: Normal rate and regular rhythm.      Heart sounds: No murmur heard.  Pulmonary:      Effort: Pulmonary effort is normal. No respiratory distress.      Breath sounds: Normal breath sounds. No stridor. No wheezing or rales.   Abdominal:      General: Bowel sounds are normal.      Palpations: Abdomen is soft.   Musculoskeletal:         General: No deformity.      Cervical back: Neck supple.   Skin:     General: Skin is warm and dry.   Neurological:      Mental Status: She is alert and oriented to person, place, and time.   Psychiatric:         Behavior: Behavior normal.         DATA:    LDCT on 9/12/24  EXAM: CT LUNG SCREENING WITHOUT CONTRAST   INDICATION: Lung Cancer Screening, Former smoker, quit within the last 15 yrs, =/> 30 pk yrs; Screening for malignant neoplasm of respiratory organ; Personal history of tobacco use, presenting hazards to health   COMPARISON: September 11, 2023   TECHNIQUE: Multidetector CT imaging was obtained through the chest in the supine position without contrast using a low dose technique. Additional axial MIP images were reconstructed. CAD software was utilized.   DOSE INFO:   Lung Cancer Screening DLP dose (mGy*cm): 102.85 mGy*cm   Lung Cancer Screening CTDIvol (mGy): 2.85 mGy   FINDINGS:   MEDICAL DEVICES: None.   AIRWAYS & LUNGS: The central airways are patent. Mild upper lobe predominant emphysema.   NODULES:  Previously seen sub 6 mm pulmonary nodules are unchanged, likely benign, with no new suspicious or enlarging pulmonary nodules.   PLEURA: No pleural effusions or pneumothorax.   LOWER NECK: Unremarkable.   HEART: The heart is normal in size. Minimal coronary artery calcifications. No significant pericardial

## 2024-11-07 ASSESSMENT — ENCOUNTER SYMPTOMS
CHEST TIGHTNESS: 0
SHORTNESS OF BREATH: 0
WHEEZING: 0
COUGH: 0

## 2024-11-14 ENCOUNTER — OFFICE VISIT (OUTPATIENT)
Dept: INTERNAL MEDICINE CLINIC | Age: 59
End: 2024-11-14

## 2024-11-14 VITALS
OXYGEN SATURATION: 97 % | DIASTOLIC BLOOD PRESSURE: 80 MMHG | HEART RATE: 68 BPM | WEIGHT: 150 LBS | TEMPERATURE: 97 F | BODY MASS INDEX: 28.34 KG/M2 | SYSTOLIC BLOOD PRESSURE: 122 MMHG

## 2024-11-14 DIAGNOSIS — R10.33 PERIUMBILICAL ABDOMINAL PAIN: Primary | ICD-10-CM

## 2024-11-14 DIAGNOSIS — F41.9 ANXIETY: ICD-10-CM

## 2024-11-14 RX ORDER — PANTOPRAZOLE SODIUM 40 MG/1
40 TABLET, DELAYED RELEASE ORAL
Qty: 90 TABLET | Refills: 1 | Status: SHIPPED | OUTPATIENT
Start: 2024-11-14

## 2024-11-14 SDOH — ECONOMIC STABILITY: FOOD INSECURITY: WITHIN THE PAST 12 MONTHS, YOU WORRIED THAT YOUR FOOD WOULD RUN OUT BEFORE YOU GOT MONEY TO BUY MORE.: NEVER TRUE

## 2024-11-14 SDOH — ECONOMIC STABILITY: FOOD INSECURITY: WITHIN THE PAST 12 MONTHS, THE FOOD YOU BOUGHT JUST DIDN'T LAST AND YOU DIDN'T HAVE MONEY TO GET MORE.: NEVER TRUE

## 2024-11-14 SDOH — ECONOMIC STABILITY: INCOME INSECURITY: HOW HARD IS IT FOR YOU TO PAY FOR THE VERY BASICS LIKE FOOD, HOUSING, MEDICAL CARE, AND HEATING?: NOT HARD AT ALL

## 2024-11-14 ASSESSMENT — ENCOUNTER SYMPTOMS
BLOOD IN STOOL: 0
ABDOMINAL PAIN: 1
COUGH: 0
SINUS PAIN: 0
NAUSEA: 0
SHORTNESS OF BREATH: 0
SINUS PRESSURE: 0
CONSTIPATION: 0
CHEST TIGHTNESS: 0
SORE THROAT: 0
DIARRHEA: 1
BACK PAIN: 0
VOMITING: 0
VOICE CHANGE: 0
TROUBLE SWALLOWING: 0
WHEEZING: 0
ABDOMINAL DISTENTION: 0

## 2024-11-14 NOTE — PROGRESS NOTES
[facility]  Follow-up visit   2024    Arlen English (:  1965) is a 59 y.o. female, here for follow-up:    Chief Complaint   Patient presents with    Abdominal Pain     X2 wks severe stomach pains cramping , (L) upper quad worse pain started brat diet x 1day ago   Now having diarrhea also        HPI    The patient is a 59-year-old female who is here to establish care with me.  She has a past medical history of anxiety, hypothyroidism, and vitamin D deficiency.  She works in an office.  She exercises with walking approximately 2 miles 5-7 times weekly on a regular basis.  She has no special diet, although she tries to avoid carbonated beverages and concentrated sweets.  She is single.  She has a 21-year-old son who lives in the [facility].  She quit smoking in 2009.     Abdominal Pain  The patient has had left-sided upper abdominal pain, as well as some periumbilical pain over the past 2 weeks.  Her weight is down by approximately 4 pounds since the last documented weight earlier in the month.  She has tried eating bland foods.  She tried some Pepto-Bismol which gave her a mild amount of relief.  She denies any fevers or chills.  She has not had any nausea or vomiting.  She had an episode of diarrheal stool today, but this was predominantly just today.  The differential diagnosis is wide.  I have ordered blood work, a CT scan of the abdomen and pelvis.  I have ordered Protonix 40 mg p.o. daily for her to use while the workup is pending.    Anxiety  She reports bad experience of withdrawal after sudden discontinued of SSRI by one of her previous doctors. She listed several classes of medications that she tried before such as SSRI, SNRI, TCA and will not willing to switch to any other medication due to bad experience.  Overall, the patient's symptoms are well-controlled.  She was prescribed Klonopin in the past.  She uses it very sparingly.  In fact, prescription written last April

## 2024-11-15 ENCOUNTER — HOSPITAL ENCOUNTER (OUTPATIENT)
Dept: PULMONOLOGY | Age: 59
Discharge: HOME | End: 2024-11-15
Attending: INTERNAL MEDICINE
Payer: COMMERCIAL

## 2024-11-15 ENCOUNTER — HOSPITAL ENCOUNTER (OUTPATIENT)
Dept: PULMONOLOGY | Age: 59
End: 2024-11-15
Attending: INTERNAL MEDICINE
Payer: COMMERCIAL

## 2024-11-15 DIAGNOSIS — R10.33 PERIUMBILICAL ABDOMINAL PAIN: ICD-10-CM

## 2024-11-15 DIAGNOSIS — J43.2 CENTRILOBULAR EMPHYSEMA (HCC): ICD-10-CM

## 2024-11-15 LAB
DEPRECATED RDW RBC AUTO: 13.8 % (ref 12.4–15.4)
HCT VFR BLD AUTO: 38.2 % (ref 36–48)
HGB BLD-MCNC: 12.2 G/DL (ref 12–16)
MCH RBC QN AUTO: 29.1 PG (ref 26–34)
MCHC RBC AUTO-ENTMCNC: 31.9 G/DL (ref 31–36)
MCV RBC AUTO: 91.2 FL (ref 80–100)
PLATELET # BLD AUTO: 400 K/UL (ref 135–450)
PMV BLD AUTO: 9.5 FL (ref 5–10.5)
RBC # BLD AUTO: 4.18 M/UL (ref 4–5.2)
WBC # BLD AUTO: 14.9 K/UL (ref 4–11)

## 2024-11-15 PROCEDURE — 94760 N-INVAS EAR/PLS OXIMETRY 1: CPT

## 2024-11-15 PROCEDURE — 94060 EVALUATION OF WHEEZING: CPT

## 2024-11-15 PROCEDURE — 94726 PLETHYSMOGRAPHY LUNG VOLUMES: CPT

## 2024-11-15 PROCEDURE — 94729 DIFFUSING CAPACITY: CPT

## 2024-11-15 PROCEDURE — 6360000002 HC RX W HCPCS: Performed by: INTERNAL MEDICINE

## 2024-11-15 PROCEDURE — 94664 DEMO&/EVAL PT USE INHALER: CPT

## 2024-11-15 RX ORDER — ALBUTEROL SULFATE 0.83 MG/ML
2.5 SOLUTION RESPIRATORY (INHALATION) ONCE
Status: COMPLETED | OUTPATIENT
Start: 2024-11-15 | End: 2024-11-15

## 2024-11-15 RX ADMIN — ALBUTEROL SULFATE 2.5 MG: 2.5 SOLUTION RESPIRATORY (INHALATION) at 15:09

## 2024-11-16 ENCOUNTER — PATIENT MESSAGE (OUTPATIENT)
Dept: INTERNAL MEDICINE CLINIC | Age: 59
End: 2024-11-16

## 2024-11-16 LAB
ALBUMIN SERPL-MCNC: 4 G/DL (ref 3.4–5)
ALBUMIN/GLOB SERPL: 1.5 {RATIO} (ref 1.1–2.2)
ALP SERPL-CCNC: 88 U/L (ref 40–129)
ALT SERPL-CCNC: 11 U/L (ref 10–40)
ANION GAP SERPL CALCULATED.3IONS-SCNC: 12 MMOL/L (ref 3–16)
AST SERPL-CCNC: 21 U/L (ref 15–37)
BILIRUB SERPL-MCNC: <0.2 MG/DL (ref 0–1)
BUN SERPL-MCNC: 26 MG/DL (ref 7–20)
CALCIUM SERPL-MCNC: 9.2 MG/DL (ref 8.3–10.6)
CHLORIDE SERPL-SCNC: 105 MMOL/L (ref 99–110)
CO2 SERPL-SCNC: 26 MMOL/L (ref 21–32)
CREAT SERPL-MCNC: 1.1 MG/DL (ref 0.6–1.1)
GFR SERPLBLD CREATININE-BSD FMLA CKD-EPI: 59 ML/MIN/{1.73_M2}
GLUCOSE SERPL-MCNC: 96 MG/DL (ref 70–99)
LIPASE SERPL-CCNC: 40 U/L (ref 13–60)
POTASSIUM SERPL-SCNC: 4.8 MMOL/L (ref 3.5–5.1)
PROT SERPL-MCNC: 6.7 G/DL (ref 6.4–8.2)
SODIUM SERPL-SCNC: 143 MMOL/L (ref 136–145)

## 2024-11-18 NOTE — TELEPHONE ENCOUNTER
Your GFR was 59.  Normal is 60.  There is some margin of error for the test.  Similarly, your BUN is very close to normal.  We do not need to pursue any workup, or make any changes with respect to these minimally abnormal tests.  You may benefit from increasing your fluid intake, drinking more water.      Your white blood cell count has been chronically elevated.  There has been a stable, mild elevation in this lab value since 2015.  Your current value is essentially the same as the last testing done 6/5/2019.  If you wish to discuss or do anything about this abnormality, please make an appointment and we can discuss the pros and cons of doing so.      When I initially review your labs, I write a brief descriptor of my impressions such as \"lab results are normal\", or \"lab results are stable\" to give you a big picture review of the results.  In your case, there are always going to be minor abnormalities which are either chronic and stable, or very close to the normal value.  Please make an appointment for further discussion/workup options if you wish.

## 2024-11-23 ENCOUNTER — PATIENT MESSAGE (OUTPATIENT)
Dept: INTERNAL MEDICINE CLINIC | Age: 59
End: 2024-11-23

## 2024-11-23 ENCOUNTER — HOSPITAL ENCOUNTER (OUTPATIENT)
Dept: CT IMAGING | Age: 59
Discharge: HOME | End: 2024-11-23
Attending: HOSPITALIST
Payer: COMMERCIAL

## 2024-11-23 DIAGNOSIS — R10.33 PERIUMBILICAL ABDOMINAL PAIN: ICD-10-CM

## 2024-11-23 PROCEDURE — 74176 CT ABD & PELVIS W/O CONTRAST: CPT

## 2024-11-23 PROCEDURE — 2500000003 HC RX 250 WO HCPCS: Performed by: HOSPITALIST

## 2024-11-23 RX ADMIN — BARIUM SULFATE 900 ML: 20 SUSPENSION ORAL at 10:58

## 2024-11-25 NOTE — TELEPHONE ENCOUNTER
I would advise to stop this medication. Can try Pepcid or Prilosec over the counter. I would advise to follow up with Dr. Smith if still sx.

## 2024-12-09 ENCOUNTER — OFFICE VISIT (OUTPATIENT)
Dept: PULMONOLOGY | Age: 59
End: 2024-12-09
Payer: COMMERCIAL

## 2024-12-09 VITALS
RESPIRATION RATE: 16 BRPM | HEIGHT: 61 IN | SYSTOLIC BLOOD PRESSURE: 110 MMHG | WEIGHT: 155 LBS | OXYGEN SATURATION: 98 % | DIASTOLIC BLOOD PRESSURE: 60 MMHG | BODY MASS INDEX: 29.27 KG/M2 | HEART RATE: 63 BPM

## 2024-12-09 DIAGNOSIS — J43.9 MILD EMPHYSEMA (HCC): Primary | ICD-10-CM

## 2024-12-09 PROCEDURE — 99213 OFFICE O/P EST LOW 20 MIN: CPT | Performed by: INTERNAL MEDICINE

## 2024-12-09 NOTE — PROGRESS NOTES
[facility]monary and [facility]    Outpatient Note    Subjective:   CHIEF COMPLAINT:   Chief Complaint   Patient presents with    COPD     Interval history on 12/9/24  She is here today for regular f/u after PFT  No new complains.      HPI:  11/6/24   The patient is 59 y.o female who presents today for a new patient visit for evaluation of emphysema.    She quit smoking in 2009.  She quit smoking pot recently after finding out she has emphysema on LDCT    She has chest tightness, usually get worse with eating edibles at night.  Cough is resolved since she quit smoking.  She used to have wheeze with coughing.    She has some SOB on exertion. She can walk two flights without stopping.    There is no chest pain on exertion.  There is no palpitation.    Occasional seasonal allergies.  She has little post nasal drip.    No GERD symptoms.      She has hx of colon cancer.  It was treated with surgery in 2017.    She has ETTA but she doesn't tolerate CPAP.      Past Medical History:    Past Medical History:   Diagnosis Date    Back pain     Chicken pox     Colon cancer (HCC)     Depression 1992    NO MEDS NOW OKAY    Fibromyalgia     Gastric ulcer, unspecified as acute or chronic, without mention of hemorrhage, perforation, or obstruction 1989    Gene mutation     chek 22-needs breast MRI    GERD (gastroesophageal reflux disease)     Insomnia 1992    Sleep study 1992:dxed w/interuption onset insomnia    Marijuana use     no further controlled substances while using marijuana.    Mixed hyperlipidemia 10/17/2011    SLIGHT ELEVATION NO MEDS    Mononucleosis     Panic attacks 2013    Pap smear 7/2010;10/17/11;10/2012    Nml.Dr. Maddox(prior gyn)    Rectal cancer (HCC) 12/01/2017    Screening mammogram 8/2010;11/2011;2/12/2013    Nml.     Sleep apnea     DOESN'T KNOW SETTINGS    Subclinical hypothyroidism 7/2/2013    Thyroid nodules:Right 7/2/2013    Wheezing 9/6/2013       Social History:    Social History

## 2025-01-30 ENCOUNTER — TELEPHONE (OUTPATIENT)
Dept: INTERNAL MEDICINE CLINIC | Age: 60
End: 2025-01-30
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
## 2025-02-03 ENCOUNTER — TELEPHONE (OUTPATIENT)
Dept: INTERNAL MEDICINE CLINIC | Age: 60
End: 2025-02-03

## 2025-02-03 ENCOUNTER — OFFICE VISIT (OUTPATIENT)
Age: 60
End: 2025-02-03
Payer: COMMERCIAL

## 2025-02-03 VITALS
HEART RATE: 76 BPM | HEIGHT: 61 IN | DIASTOLIC BLOOD PRESSURE: 80 MMHG | SYSTOLIC BLOOD PRESSURE: 119 MMHG | OXYGEN SATURATION: 99 % | BODY MASS INDEX: 28.97 KG/M2 | WEIGHT: 153.44 LBS

## 2025-02-03 DIAGNOSIS — G47.33 OSA (OBSTRUCTIVE SLEEP APNEA): Primary | ICD-10-CM

## 2025-02-03 PROCEDURE — 99204 OFFICE O/P NEW MOD 45 MIN: CPT | Performed by: STUDENT IN AN ORGANIZED HEALTH CARE EDUCATION/TRAINING PROGRAM

## 2025-02-03 RX ORDER — SENNOSIDES A AND B 8.6 MG/1
1 TABLET, FILM COATED ORAL DAILY
COMMUNITY

## 2025-02-03 ASSESSMENT — SLEEP AND FATIGUE QUESTIONNAIRES
HOW LIKELY ARE YOU TO NOD OFF OR FALL ASLEEP WHILE SITTING INACTIVE IN A PUBLIC PLACE: MODERATE CHANCE OF DOZING
HOW LIKELY ARE YOU TO NOD OFF OR FALL ASLEEP IN A CAR, WHILE STOPPED FOR A FEW MINUTES IN TRAFFIC: MODERATE CHANCE OF DOZING
HOW LIKELY ARE YOU TO NOD OFF OR FALL ASLEEP WHEN YOU ARE A PASSENGER IN A CAR FOR AN HOUR WITHOUT A BREAK: SLIGHT CHANCE OF DOZING
HOW LIKELY ARE YOU TO NOD OFF OR FALL ASLEEP WHILE SITTING QUIETLY AFTER LUNCH WITHOUT ALCOHOL: SLIGHT CHANCE OF DOZING
HOW LIKELY ARE YOU TO NOD OFF OR FALL ASLEEP WHILE WATCHING TV: SLIGHT CHANCE OF DOZING
HOW LIKELY ARE YOU TO NOD OFF OR FALL ASLEEP WHILE SITTING AND TALKING TO SOMEONE: WOULD NEVER DOZE
HOW LIKELY ARE YOU TO NOD OFF OR FALL ASLEEP WHILE LYING DOWN TO REST IN THE AFTERNOON WHEN CIRCUMSTANCES PERMIT: WOULD NEVER DOZE
HOW LIKELY ARE YOU TO NOD OFF OR FALL ASLEEP WHILE SITTING AND READING: SLIGHT CHANCE OF DOZING
ESS TOTAL SCORE: 8

## 2025-02-03 NOTE — TELEPHONE ENCOUNTER
Pt is returning [facility] phone call for referral. Pt is very upset    [phone]  Pls call and advise

## 2025-02-03 NOTE — PROGRESS NOTES
[facility]  Sleep Medicine  [address], [address]  [place] [address]    Chief Complaint   Patient presents with    New Patient     [place] has a Cpap, not using. Wants to discuss filiberto       Arlen English is a 59 y.o. female who comes in for evaluation of previously diagnosed ETTA. She was diagnosed over 10 years ago. Patient used to be on PAP therapy but patient stopped PAP therapy several years ago. Patient is here to discuss Filiberto if she still has ETTA.    Pertinent PMHx includes: hx of ETTA, hypothyroidism.     She goes to bed at around 8-8:30pm. She falls asleep in few minutes (she takes marijuana gummies a couple of hours before bedtime).  She awakens a few times per night. The average total amount of sleep is about 8 hours per night and takes no naps. She does use sleep aid/s: marijuana edibles; she also takes gabapentin (she used to take klonopin and benadryl for insomnia but no longer). She continues to have complaints of fatigue.    Caffeinated drinks/day: none  Alcohol drinks/day/week: social drinker  Occupation: working in office      DATA REVIEWED TODAY:    Diagnostic Review  PSG on 8/18/2017 showed apnea hypopnea index of 23.3/h with oxygen desaturation down to a mandeep of 75%.   This study was completed at [facility].    [facility] & Insomnia Severity Index scores      2/3/2025     3:46 PM 3/14/2018    11:49 AM 8/2/2017    10:09 AM 6/1/2017    10:09 AM 12/21/2016     9:44 AM 6/22/2016     9:50 AM 6/3/2015    10:00 AM   Sleep Medicine   Sitting and reading 1 1 1 1 1 0 1   Watching TV 1 1 1 1 1 0 1   Sitting, inactive in a public place (e.g. a theatre or a meeting) 2 1 1 0 1 1 1   As a passenger in a car for an hour without a break 1 1 1 1 1 2 1   Lying down to rest in the afternoon when circumstances permit 0 1 1 1 1 2 1   Sitting and talking to someone 0 0 0 0 0 0 0   Sitting quietly after a lunch without alcohol 1 0 0 0 1 0 1   In a car, while stopped for a few minutes in traffic 2 1 1

## 2025-02-03 NOTE — PATIENT INSTRUCTIONS
Patient information on the evaluation procedure for sleep apnea:    You will have a sleep study scheduled to see if you have obstructive sleep apnea.     The sleep office will call you with the results a week after the study. If the study shows that you have obstructive sleep apnea, you will be told of the next step in your care. Commonly the recommended treatment is CPAP Therapy. If you agree to this therapy and you receive your CPAP before your next appointment, please bring it with you to your follow-up appointment.    Patients who have obstructive sleep apnea on the sleep study and have chosen to try CPAP:  A home equipment company will contact you to set you up with a CPAP machine and fit you for a mask.    Please bring your CPAP, the mask and all supplies including the electrical cord with you to all your follow up appointments with the sleep physician    Please keep trying to use your CPAP until you have seen in the sleep office in follow up as a lot of the problems patients have with CPAP can be addressed and corrected by your sleep provider.    Sleep center contact information:  [facility]: [phone]  [facility]: [phone]         Please bring your CPAP equipment and smart card to all your sleep appointments.         What are the risk factors for Obstructive Sleep Apnea (ETTA)?  Obesity  Snoring  Daytime sleepiness  Increasing age  Male gender  Taking sedating medications  Alcohol use  Hypertension  Stroke  Diabetes  Smoking     What is ETTA?  People with ETTA experience recurrent episodes during sleep when their throat closes or significantly narrows and they cannot inhale air into their lungs. This happens because the muscles that normally hold the throat open during wakefulness relax during sleep and allow it to narrow        When the muscles relax too much, trying to inhale can cause the throat to completely close and air cannot pass at all for at least 10

## 2025-02-03 NOTE — TELEPHONE ENCOUNTER
Appointments are available over the next 2 days.  Please help her make 1 of these.  We can add her on if necessary

## 2025-02-04 DIAGNOSIS — M54.2 NECK PAIN: Primary | ICD-10-CM

## 2025-02-19 ENCOUNTER — OFFICE VISIT (OUTPATIENT)
Dept: FAMILY MEDICINE CLINIC | Age: 60
End: 2025-02-19

## 2025-02-19 VITALS
HEART RATE: 73 BPM | WEIGHT: 152 LBS | SYSTOLIC BLOOD PRESSURE: 112 MMHG | DIASTOLIC BLOOD PRESSURE: 70 MMHG | BODY MASS INDEX: 28.7 KG/M2 | OXYGEN SATURATION: 98 % | HEIGHT: 61 IN

## 2025-02-19 DIAGNOSIS — E03.4 HYPOTHYROIDISM DUE TO ACQUIRED ATROPHY OF THYROID: Chronic | ICD-10-CM

## 2025-02-19 DIAGNOSIS — Z85.048 HISTORY OF RECTAL CANCER: Primary | ICD-10-CM

## 2025-02-19 DIAGNOSIS — F41.1 GENERALIZED ANXIETY DISORDER: ICD-10-CM

## 2025-02-19 PROBLEM — R14.0 ABDOMINAL BLOATING: Status: RESOLVED | Noted: 2023-08-01 | Resolved: 2025-02-19

## 2025-02-19 RX ORDER — POLYETHYLENE GLYCOL 3350 17 G/17G
17 POWDER, FOR SOLUTION ORAL DAILY
COMMUNITY

## 2025-02-19 SDOH — HEALTH STABILITY: PHYSICAL HEALTH: ON AVERAGE, HOW MANY MINUTES DO YOU ENGAGE IN EXERCISE AT THIS LEVEL?: 30 MIN

## 2025-02-19 SDOH — HEALTH STABILITY: PHYSICAL HEALTH: ON AVERAGE, HOW MANY DAYS PER WEEK DO YOU ENGAGE IN MODERATE TO STRENUOUS EXERCISE (LIKE A BRISK WALK)?: 6 DAYS

## 2025-02-19 ASSESSMENT — ENCOUNTER SYMPTOMS
SHORTNESS OF BREATH: 0
ABDOMINAL PAIN: 0

## 2025-02-19 NOTE — PROGRESS NOTES
Arlen English is a 59 y.o.female who presents with   Chief Complaint   Patient presents with    Establish Care     New to provider   .    History of Present Illness      Patient presents to establish care.  Denies any complaints or concerns today.  Patient has a prior history of rectal cancer status post treatment.  Reports a significant history of very heavy smoking in the past.  Currently has emphysema follows with pulmonology.  Smokes marijuana.  Has been counseled against this.    Hypothyroidism which she follows with endocrinology.        Review of Systems   Constitutional:  Negative for fatigue.   Eyes:  Negative for visual disturbance.   Respiratory:  Negative for shortness of breath.    Cardiovascular:  Negative for chest pain.   Gastrointestinal:  Negative for abdominal pain.   Skin:  Negative for rash.   Neurological:  Negative for dizziness, light-headedness and headaches.        Past Medical History:   Diagnosis Date    Ankle joint effusion:Left 02/09/2012    Back pain     Calculus of gallbladder without cholecystitis without obstruction 10/07/2015    Chicken pox     Colon cancer (HCC)     Depression 1992    NO MEDS NOW OKAY    Fibromyalgia     Gastric ulcer, unspecified as acute or chronic, without mention of hemorrhage, perforation, or obstruction 1989    Gene mutation     chek 22-needs breast MRI    GERD (gastroesophageal reflux disease)     Insomnia 1992    Sleep study 1992:dxed w/interuption onset insomnia    Marijuana use     no further controlled substances while using marijuana.    Mixed hyperlipidemia 10/17/2011    SLIGHT ELEVATION NO MEDS    Mononucleosis     Panic attacks 2013    Pap smear 7/2010;10/17/11;10/2012    Nml.Dr. Maddox(prior gyn)    Rectal cancer (HCC) 12/01/2017    Screening mammogram 8/2010;11/2011;2/12/2013    Nml.     Sleep apnea     DOESN'T KNOW SETTINGS    Subclinical hypothyroidism 07/02/2013    Thyroid nodules:Right 07/02/2013    Wheezing 09/06/2013       Past Surgical

## 2025-02-20 DIAGNOSIS — G47.9 SLEEP DISORDER: ICD-10-CM

## 2025-02-20 DIAGNOSIS — M79.7 FIBROMYALGIA: Chronic | ICD-10-CM

## 2025-02-21 RX ORDER — GABAPENTIN 300 MG/1
CAPSULE ORAL
Qty: 180 CAPSULE | Refills: 3 | Status: SHIPPED | OUTPATIENT
Start: 2025-02-21 | End: 2025-06-29

## 2025-03-14 ENCOUNTER — HOSPITAL ENCOUNTER (OUTPATIENT)
Dept: SLEEP CENTER | Age: 60
Discharge: HOME | End: 2025-03-14

## 2025-03-14 DIAGNOSIS — G47.33 OSA (OBSTRUCTIVE SLEEP APNEA): ICD-10-CM

## 2025-03-16 DIAGNOSIS — E55.9 VITAMIN D DEFICIENCY: ICD-10-CM

## 2025-03-16 DIAGNOSIS — E06.3 HASHIMOTO'S DISEASE: Chronic | ICD-10-CM

## 2025-03-17 RX ORDER — ERGOCALCIFEROL 1.25 MG/1
CAPSULE, LIQUID FILLED ORAL
Qty: 2 CAPSULE | Refills: 4 | Status: SHIPPED | OUTPATIENT
Start: 2025-03-17

## 2025-03-17 NOTE — TELEPHONE ENCOUNTER
Medication:   Requested Prescriptions     Pending Prescriptions Disp Refills    vitamin D (ERGOCALCIFEROL) 1.25 MG (28029 UT) CAPS capsule [Pharmacy Med Name: VIT D2 (ERGOCAL) 1.25MG(50,000U) CP] 2 capsule 5     Sig: TAKE 1 CAPSULE BY MOUTH EVERY 2 WEEKS       Last Filled:      Patient Phone Number: [phone] (home)     Last appt: 8/6/2024   Next appt: 8/5/2025    Last Labs DM:   Lab Results   Component Value Date/Time    VITD25 39.6 07/15/2024 12:34 PM    VITD25 56.2 07/26/2023 04:30 PM

## 2025-04-09 DIAGNOSIS — F41.9 ANXIETY: ICD-10-CM

## 2025-04-10 RX ORDER — CLONAZEPAM 0.5 MG/1
TABLET ORAL
Qty: 60 TABLET | Refills: 2 | Status: SHIPPED | OUTPATIENT
Start: 2025-04-10 | End: 2025-06-09

## 2025-04-11 ENCOUNTER — HOSPITAL ENCOUNTER (OUTPATIENT)
Dept: SLEEP CENTER | Age: 60
Discharge: HOME | End: 2025-04-11

## 2025-04-14 ENCOUNTER — TELEPHONE (OUTPATIENT)
Dept: PULMONOLOGY | Age: 60
End: 2025-04-14

## 2025-04-14 NOTE — TELEPHONE ENCOUNTER
Patient's study is consistent with mild ETTA. I will contact her to discuss result and treatment plan.    Kris Hoffmann MD

## 2025-04-15 NOTE — TELEPHONE ENCOUNTER
I spoke to patient regarding sleep test result and she is opting for MAD. I will send her a list of ETTA dentists. No follow up needed  at this time.    Kris Hoffmann MD

## 2025-07-09 ENCOUNTER — OFFICE VISIT (OUTPATIENT)
Dept: FAMILY MEDICINE CLINIC | Age: 60
End: 2025-07-09
Payer: COMMERCIAL

## 2025-07-09 VITALS
SYSTOLIC BLOOD PRESSURE: 110 MMHG | BODY MASS INDEX: 28.01 KG/M2 | WEIGHT: 152.2 LBS | HEIGHT: 62 IN | OXYGEN SATURATION: 99 % | DIASTOLIC BLOOD PRESSURE: 72 MMHG | HEART RATE: 62 BPM

## 2025-07-09 DIAGNOSIS — J43.9 MILD EMPHYSEMA (HCC): ICD-10-CM

## 2025-07-09 DIAGNOSIS — H91.93 HEARING DIFFICULTY OF BOTH EARS: ICD-10-CM

## 2025-07-09 DIAGNOSIS — Z12.4 CERVICAL CANCER SCREENING: ICD-10-CM

## 2025-07-09 DIAGNOSIS — G47.9 SLEEP DIFFICULTIES: ICD-10-CM

## 2025-07-09 DIAGNOSIS — Z00.00 WELL ADULT EXAM: Primary | ICD-10-CM

## 2025-07-09 DIAGNOSIS — Z76.89 ENCOUNTER FOR WEIGHT MANAGEMENT: ICD-10-CM

## 2025-07-09 DIAGNOSIS — F41.1 GENERALIZED ANXIETY DISORDER: ICD-10-CM

## 2025-07-09 DIAGNOSIS — G47.30 SLEEP APNEA, UNSPECIFIED TYPE: ICD-10-CM

## 2025-07-09 PROCEDURE — 99396 PREV VISIT EST AGE 40-64: CPT | Performed by: STUDENT IN AN ORGANIZED HEALTH CARE EDUCATION/TRAINING PROGRAM

## 2025-07-09 PROCEDURE — 99215 OFFICE O/P EST HI 40 MIN: CPT | Performed by: STUDENT IN AN ORGANIZED HEALTH CARE EDUCATION/TRAINING PROGRAM

## 2025-07-09 NOTE — PROGRESS NOTES
Arlen English is a 59 y.o.female who presents with   Chief Complaint   Patient presents with    Annual Exam     History of Present Illness  The patient presents for a physical exam.    She has been experiencing anxiety, which she manages with Klonopin as needed. She has considered counseling for her anxiety but is unsure of its potential benefits.    She has been making efforts to lose weight through regular exercise, including gym workouts and daily walks. Her diet has improved, with a reduction in sugar intake and an increase in vegetable and protein consumption. She has also been trying to reduce her carbohydrate intake. She does not consume soda but reports occasional indulgence in candy and cookies at work. She has been consuming more alcohol than usual over the past few months, although not on a weekly basis. Her sleep is insufficient due to a lifelong sleep disorder. She occasionally experiences fatigue, which she attributes to her lack of sleep.    She has a history of bowel issues, for which she takes senna at bedtime as prescribed by her gastroenterologist. She has tried Linzess and another similar medication, both of which were ineffective and caused frequent bathroom visits. She has also tried MiraLAX and was advised to take bisacodyl twice daily, but she declined this recommendation. She has developed psychological issues related to bathroom use and prefers to take senna at night to avoid needing to use the bathroom during the day. She has an upcoming appointment with a new GI doctor. She occasionally notices blood in her stool, which she believes is due to hemorrhoids. She has a history of rectal cancer and underwent a full bowel resection. She has been advised that there are areas in her colon where the nerves are not functioning properly. She has been prescribed milk of magnesia by one of her previous GI doctors.    She has a history of mild emphysema and undergoes annual lung screenings due to her 
Never

## 2025-07-25 DIAGNOSIS — E06.3 HASHIMOTO'S DISEASE: Chronic | ICD-10-CM

## 2025-07-25 DIAGNOSIS — E55.9 VITAMIN D DEFICIENCY: ICD-10-CM

## 2025-07-25 DIAGNOSIS — Z00.00 WELL ADULT EXAM: ICD-10-CM

## 2025-07-26 LAB
25(OH)D3 SERPL-MCNC: 47.8 NG/ML
ALBUMIN SERPL-MCNC: 4.2 G/DL (ref 3.4–5)
ALBUMIN/GLOB SERPL: 1.7 {RATIO} (ref 1.1–2.2)
ALP SERPL-CCNC: 70 U/L (ref 40–129)
ALT SERPL-CCNC: 16 U/L (ref 10–40)
ANION GAP SERPL CALCULATED.3IONS-SCNC: 9 MMOL/L (ref 3–16)
AST SERPL-CCNC: 21 U/L (ref 15–37)
BASOPHILS # BLD: 0 K/UL (ref 0–0.2)
BASOPHILS NFR BLD: 0 %
BILIRUB SERPL-MCNC: 0.3 MG/DL (ref 0–1)
BUN SERPL-MCNC: 19 MG/DL (ref 7–20)
CALCIUM SERPL-MCNC: 9.6 MG/DL (ref 8.3–10.6)
CHLORIDE SERPL-SCNC: 105 MMOL/L (ref 99–110)
CHOLEST SERPL-MCNC: 177 MG/DL (ref 0–199)
CO2 SERPL-SCNC: 25 MMOL/L (ref 21–32)
CREAT SERPL-MCNC: 0.7 MG/DL (ref 0.6–1.1)
DEPRECATED RDW RBC AUTO: 14 % (ref 12.4–15.4)
EOSINOPHIL # BLD: 0.2 K/UL (ref 0–0.6)
EOSINOPHIL NFR BLD: 3 %
EST. AVERAGE GLUCOSE BLD GHB EST-MCNC: 114 MG/DL
FOLATE SERPL-MCNC: 4.28 NG/ML (ref 4.78–24.2)
GFR SERPLBLD CREATININE-BSD FMLA CKD-EPI: >90 ML/MIN/{1.73_M2}
GLUCOSE SERPL-MCNC: 83 MG/DL (ref 70–99)
HBA1C MFR BLD: 5.6 %
HCT VFR BLD AUTO: 38.2 % (ref 36–48)
HDLC SERPL-MCNC: 55 MG/DL (ref 40–60)
HGB BLD-MCNC: 12.9 G/DL (ref 12–16)
LDLC SERPL CALC-MCNC: 103 MG/DL
LYMPHOCYTES # BLD: 3.1 K/UL (ref 1–5.1)
LYMPHOCYTES NFR BLD: 31 %
MCH RBC QN AUTO: 30.1 PG (ref 26–34)
MCHC RBC AUTO-ENTMCNC: 33.7 G/DL (ref 31–36)
MCV RBC AUTO: 89.4 FL (ref 80–100)
MONOCYTES # BLD: 0.9 K/UL (ref 0–1.3)
MONOCYTES NFR BLD: 11 %
NEUTROPHILS # BLD: 4.1 K/UL (ref 1.7–7.7)
NEUTROPHILS NFR BLD: 37 %
NEUTS BAND NFR BLD MANUAL: 12 % (ref 0–7)
PLATELET # BLD AUTO: 254 K/UL (ref 135–450)
PLATELET BLD QL SMEAR: ADEQUATE
PMV BLD AUTO: 10.5 FL (ref 5–10.5)
POTASSIUM SERPL-SCNC: 4.4 MMOL/L (ref 3.5–5.1)
PROT SERPL-MCNC: 6.7 G/DL (ref 6.4–8.2)
RBC # BLD AUTO: 4.27 M/UL (ref 4–5.2)
RBC MORPH BLD: NORMAL
SLIDE REVIEW: ABNORMAL
SODIUM SERPL-SCNC: 139 MMOL/L (ref 136–145)
TRIGL SERPL-MCNC: 93 MG/DL (ref 0–150)
TSH SERPL DL<=0.005 MIU/L-ACNC: 2.92 UIU/ML (ref 0.27–4.2)
VARIANT LYMPHS NFR BLD MANUAL: 6 % (ref 0–6)
VIT B12 SERPL-MCNC: 651 PG/ML (ref 211–911)
VLDLC SERPL CALC-MCNC: 19 MG/DL
WBC # BLD AUTO: 8.3 K/UL (ref 4–11)

## 2025-08-04 DIAGNOSIS — E55.9 VITAMIN D DEFICIENCY: ICD-10-CM

## 2025-08-04 DIAGNOSIS — E06.3 HASHIMOTO'S DISEASE: Chronic | ICD-10-CM

## 2025-08-04 RX ORDER — ERGOCALCIFEROL 1.25 MG/1
CAPSULE, LIQUID FILLED ORAL
Qty: 2 CAPSULE | Refills: 4 | Status: SHIPPED | OUTPATIENT
Start: 2025-08-04

## 2025-08-08 ENCOUNTER — PATIENT MESSAGE (OUTPATIENT)
Dept: FAMILY MEDICINE CLINIC | Age: 60
End: 2025-08-08

## 2025-08-08 DIAGNOSIS — E06.3 HASHIMOTO'S DISEASE: Chronic | ICD-10-CM

## 2025-08-08 DIAGNOSIS — E55.9 VITAMIN D DEFICIENCY: ICD-10-CM

## 2025-08-08 DIAGNOSIS — G47.9 SLEEP DISORDER: ICD-10-CM

## 2025-08-08 DIAGNOSIS — M79.7 FIBROMYALGIA: Chronic | ICD-10-CM

## 2025-08-08 RX ORDER — GABAPENTIN 300 MG/1
CAPSULE ORAL
Qty: 60 CAPSULE | Refills: 3 | Status: SHIPPED | OUTPATIENT
Start: 2025-08-08 | End: 2025-12-14

## 2025-08-11 DIAGNOSIS — R79.9 ABNORMAL BLOOD CHEMISTRY: ICD-10-CM

## 2025-08-11 RX ORDER — LEVOTHYROXINE SODIUM 50 UG/1
50 TABLET ORAL DAILY
Qty: 30 TABLET | Refills: 12 | Status: SHIPPED | OUTPATIENT
Start: 2025-08-11

## 2025-08-12 ENCOUNTER — RESULTS FOLLOW-UP (OUTPATIENT)
Dept: FAMILY MEDICINE CLINIC | Age: 60
End: 2025-08-12

## 2025-08-12 LAB
BASOPHILS # BLD: 0.1 K/UL (ref 0–0.2)
BASOPHILS NFR BLD: 0.6 %
DEPRECATED RDW RBC AUTO: 14 % (ref 12.4–15.4)
EOSINOPHIL # BLD: 0.3 K/UL (ref 0–0.6)
EOSINOPHIL NFR BLD: 2.9 %
HCT VFR BLD AUTO: 38.4 % (ref 36–48)
HGB BLD-MCNC: 12.5 G/DL (ref 12–16)
LYMPHOCYTES # BLD: 3.9 K/UL (ref 1–5.1)
LYMPHOCYTES NFR BLD: 42.1 %
MCH RBC QN AUTO: 29.4 PG (ref 26–34)
MCHC RBC AUTO-ENTMCNC: 32.7 G/DL (ref 31–36)
MCV RBC AUTO: 90 FL (ref 80–100)
MONOCYTES # BLD: 0.7 K/UL (ref 0–1.3)
MONOCYTES NFR BLD: 7.1 %
NEUTROPHILS # BLD: 4.4 K/UL (ref 1.7–7.7)
NEUTROPHILS NFR BLD: 47.3 %
PLATELET # BLD AUTO: 294 K/UL (ref 135–450)
PMV BLD AUTO: 10.4 FL (ref 5–10.5)
RBC # BLD AUTO: 4.27 M/UL (ref 4–5.2)
WBC # BLD AUTO: 9.2 K/UL (ref 4–11)

## 2025-08-19 ENCOUNTER — OFFICE VISIT (OUTPATIENT)
Dept: ENDOCRINOLOGY | Age: 60
End: 2025-08-19
Payer: COMMERCIAL

## 2025-08-19 VITALS
SYSTOLIC BLOOD PRESSURE: 113 MMHG | WEIGHT: 155 LBS | BODY MASS INDEX: 28.81 KG/M2 | HEART RATE: 68 BPM | OXYGEN SATURATION: 100 % | DIASTOLIC BLOOD PRESSURE: 80 MMHG

## 2025-08-19 DIAGNOSIS — E55.9 VITAMIN D DEFICIENCY: ICD-10-CM

## 2025-08-19 DIAGNOSIS — E06.3 HASHIMOTO'S DISEASE: Chronic | ICD-10-CM

## 2025-08-19 PROCEDURE — 99214 OFFICE O/P EST MOD 30 MIN: CPT | Performed by: INTERNAL MEDICINE

## 2025-08-19 RX ORDER — LEVOTHYROXINE SODIUM 50 UG/1
50 TABLET ORAL DAILY
Qty: 30 TABLET | Refills: 12 | Status: SHIPPED | OUTPATIENT
Start: 2025-08-19

## (undated) DEVICE — FORCEPS BX L240CM DIA2.4MM L NDL RAD JAW 4 133340